# Patient Record
Sex: FEMALE | Race: WHITE | NOT HISPANIC OR LATINO | Employment: FULL TIME | URBAN - METROPOLITAN AREA
[De-identification: names, ages, dates, MRNs, and addresses within clinical notes are randomized per-mention and may not be internally consistent; named-entity substitution may affect disease eponyms.]

---

## 2019-02-15 ENCOUNTER — OFFICE VISIT (OUTPATIENT)
Dept: FAMILY MEDICINE | Facility: CLINIC | Age: 29
End: 2019-02-15
Payer: COMMERCIAL

## 2019-02-15 VITALS
TEMPERATURE: 97.9 F | SYSTOLIC BLOOD PRESSURE: 118 MMHG | DIASTOLIC BLOOD PRESSURE: 72 MMHG | HEIGHT: 66 IN | BODY MASS INDEX: 27.48 KG/M2 | HEART RATE: 64 BPM | WEIGHT: 171 LBS

## 2019-02-15 DIAGNOSIS — Z23 NEED FOR PROPHYLACTIC VACCINATION AND INOCULATION AGAINST INFLUENZA: ICD-10-CM

## 2019-02-15 DIAGNOSIS — Z00.00 ROUTINE HISTORY AND PHYSICAL EXAMINATION OF ADULT: Primary | ICD-10-CM

## 2019-02-15 DIAGNOSIS — Z12.4 SCREENING FOR MALIGNANT NEOPLASM OF CERVIX: ICD-10-CM

## 2019-02-15 PROCEDURE — 90686 IIV4 VACC NO PRSV 0.5 ML IM: CPT | Performed by: FAMILY MEDICINE

## 2019-02-15 PROCEDURE — G0145 SCR C/V CYTO,THINLAYER,RESCR: HCPCS | Performed by: FAMILY MEDICINE

## 2019-02-15 PROCEDURE — 90471 IMMUNIZATION ADMIN: CPT | Performed by: FAMILY MEDICINE

## 2019-02-15 PROCEDURE — 99385 PREV VISIT NEW AGE 18-39: CPT | Mod: 25 | Performed by: FAMILY MEDICINE

## 2019-02-15 ASSESSMENT — ENCOUNTER SYMPTOMS
PARESTHESIAS: 0
PALPITATIONS: 0
NAUSEA: 0
HEADACHES: 0
EYE PAIN: 0
CONSTIPATION: 0
NERVOUS/ANXIOUS: 0
JOINT SWELLING: 0
SHORTNESS OF BREATH: 0
ABDOMINAL PAIN: 0
HEARTBURN: 0
DYSURIA: 0
BREAST MASS: 0
FREQUENCY: 0
ARTHRALGIAS: 0
HEMATURIA: 0
FEVER: 0
MYALGIAS: 0
DIZZINESS: 0
DIARRHEA: 0
COUGH: 0
WEAKNESS: 0
HEMATOCHEZIA: 0
SORE THROAT: 0
CHILLS: 0

## 2019-02-15 ASSESSMENT — MIFFLIN-ST. JEOR: SCORE: 1517.4

## 2019-02-15 NOTE — PROGRESS NOTES
SUBJECTIVE:   CC: Philip Pfeiffer is an 28 year old woman who presents for preventive health visit.     Physical   Annual:     Getting at least 3 servings of Calcium per day:  Yes    Bi-annual eye exam:  NO    Dental care twice a year:  Yes    Sleep apnea or symptoms of sleep apnea:  None    Diet:  Vegetarian/vegan    Frequency of exercise:  2-3 days/week    Duration of exercise:  Less than 15 minutes    Taking medications regularly:  Yes    Medication side effects:  Not applicable    Additional concerns today:  Yes (Establishing care from [facility].  Discuss starting family planning.  Would like PAP and any other updated lab work that is needed.)    PHQ-2 Total Score: 0    Patient is planning to become pregnant later this year.  They are currently using condoms for contraception.  She wants to make sure she is as healthy as possible for the pregnancy.     Today's PHQ-2 Score:   PHQ-2 ( 1999 [employer]) 2/15/2019   Q1: Little interest or pleasure in doing things 0   Q2: Feeling down, depressed or hopeless 0   PHQ-2 Score 0   Q1: Little interest or pleasure in doing things Not at all   Q2: Feeling down, depressed or hopeless Not at all   PHQ-2 Score 0       Abuse: Current or Past(Physical, Sexual or Emotional)- No  Do you feel safe in your environment? Yes    Social History     Tobacco Use     Smoking status: Never Smoker     Smokeless tobacco: Never Used   Substance Use Topics     Alcohol use: Yes     Comment: about 4 glasses wine per week     Alcohol Use 2/15/2019   If you drink alcohol do you typically have greater than 3 drinks per day OR greater than 7 drinks per week? No   No flowsheet data found.    Reviewed orders with patient.  Reviewed health maintenance and updated orders accordingly - Yes  There is no problem list on file for this patient.    Past Surgical History:   Procedure Laterality Date     ORTHOPEDIC SURGERY Right     Right 2nd and 3rd metatarsal ORIF age 14, with subsequent osteomyelitis      "  Social History     Tobacco Use     Smoking status: Never Smoker     Smokeless tobacco: Never Used   Substance Use Topics     Alcohol use: Yes     Comment: about 4 glasses wine per week     History reviewed. No pertinent family history.        Mammogram not appropriate for this patient based on age.    Pertinent mammograms are reviewed under the imaging tab.  History of abnormal Pap smear: NO - age 21-29 PAP every 3 years recommended     Reviewed and updated as needed this visit by clinical staff  Tobacco  Allergies  Meds  Med Hx  Surg Hx  Fam Hx  Soc Hx        Reviewed and updated as needed this visit by Provider            Review of Systems   Constitutional: Negative for chills and fever.   HENT: Negative for congestion, ear pain, hearing loss and sore throat.    Eyes: Negative for pain and visual disturbance.   Respiratory: Negative for cough and shortness of breath.    Cardiovascular: Negative for chest pain, palpitations and peripheral edema.   Gastrointestinal: Negative for abdominal pain, constipation, diarrhea, heartburn, hematochezia and nausea.   Breasts:  Negative for tenderness, breast mass and discharge.   Genitourinary: Negative for dysuria, frequency, genital sores, hematuria, pelvic pain, urgency, vaginal bleeding and vaginal discharge.   Musculoskeletal: Negative for arthralgias, joint swelling and myalgias.   Skin: Negative for rash.   Neurological: Negative for dizziness, weakness, headaches and paresthesias.   Psychiatric/Behavioral: Negative for mood changes. The patient is not nervous/anxious.         OBJECTIVE:   /72 (BP Location: Right arm, Cuff Size: Adult Regular)   Pulse 64   Temp 97.9  F (36.6  C) (Oral)   Ht 1.676 m (5' 6\")   Wt 77.6 kg (171 lb)   LMP 02/01/2019   BMI 27.60 kg/m    Physical Exam  GENERAL: healthy, alert and no distress  EYES: Eyes grossly normal to inspection, PERRL and conjunctivae and sclerae normal  HENT: ear canals and TM's normal, nose and mouth " "without ulcers or lesions  NECK: no adenopathy, no asymmetry, masses, or scars and thyroid normal to palpation  RESP: lungs clear to auscultation - no rales, rhonchi or wheezes  BREAST: normal without masses, tenderness or nipple discharge and no palpable axillary masses or adenopathy  CV: regular rate and rhythm, normal S1 S2, no S3 or S4, no murmur, click or rub, no peripheral edema and peripheral pulses strong  ABDOMEN: soft, nontender, no hepatosplenomegaly, no masses and bowel sounds normal   (female): normal female external genitalia, normal urethral meatus, vaginal mucosa pink, moist, well rugated, and normal cervix/adnexa/uterus without masses or discharge  MS: no gross musculoskeletal defects noted, no edema  SKIN: no suspicious lesions or rashes  NEURO: Normal strength and tone, mentation intact and speech normal  PSYCH: mentation appears normal, affect normal/bright    ASSESSMENT/PLAN:   1. Routine history and physical examination of adult    2. Screening for malignant neoplasm of cervix  - PAP imaged thin layer screen reflex to HPV if ASCUS - recommended age 25 - 29 years    3. Need for prophylactic vaccination and inoculation against influenza  - FLU VACCINE, SPLIT VIRUS, IM (QUADRIVALENT) [70508]- >3 YRS  - Vaccine Administration, Initial [93615]    COUNSELING:  Reviewed preventive health counseling, as reflected in patient instructions    BP Readings from Last 1 Encounters:   02/15/19 118/72     Estimated body mass index is 27.6 kg/m  as calculated from the following:    Height as of this encounter: 1.676 m (5' 6\").    Weight as of this encounter: 77.6 kg (171 lb).      Weight management plan: Discussed healthy diet and exercise guidelines     reports that  has never smoked. she has never used smokeless tobacco.      Counseling Resources:  ATP IV Guidelines  Pooled Cohorts Equation Calculator  Breast Cancer Risk Calculator  FRAX Risk Assessment  ICSI Preventive Guidelines  Dietary Guidelines for " Americans, 2010  USDA's MyPlate  ASA Prophylaxis  Lung CA Screening    Gabriela Avila,   [facility]

## 2019-02-15 NOTE — NURSING NOTE
Prior to injection, verified patient identity using patient's name and date of birth.  Due to injection administration, patient instructed to remain in clinic for 15 minutes  afterwards, and to report any adverse reaction to me immediately.    Marguerite Gomez CMA

## 2019-02-15 NOTE — PROGRESS NOTES

## 2019-02-15 NOTE — PATIENT INSTRUCTIONS
Preventive Health Recommendations  Female Ages 26 - 39  Yearly exam:   See your health care provider every year in order to    Review health changes.     Discuss preventive care.      Review your medicines if you your doctor has prescribed any.    Until age 30: Get a Pap test every three years (more often if you have had an abnormal result).    After age 30: Talk to your doctor about whether you should have a Pap test every 3 years or have a Pap test with HPV screening every 5 years.   You do not need a Pap test if your uterus was removed (hysterectomy) and you have not had cancer.  You should be tested each year for STDs (sexually transmitted diseases), if you're at risk.   Talk to your provider about how often to have your cholesterol checked.  If you are at risk for diabetes, you should have a diabetes test (fasting glucose).  Shots: Get a flu shot each year. Get a tetanus shot every 10 years.   Nutrition:     Eat at least 5 servings of fruits and vegetables each day.    Eat whole-grain bread, whole-wheat pasta and brown rice instead of white grains and rice.    Get adequate Calcium and Vitamin D.     Lifestyle    Exercise at least 150 minutes a week (30 minutes a day, 5 days of the week). This will help you control your weight and prevent disease.    Limit alcohol to one drink per day.    No smoking.     Wear sunscreen to prevent skin cancer.    See your dentist every six months for an exam and cleaning.    Patient Education     Preparing for Pregnancy  Even before you become pregnant, your health matters to your future baby. Adopt good health habits today. And take care of any medical problems you have before becoming pregnant.  Remember: As soon as you know you are pregnant, get regular prenatal care.   Things to consider  Read through the list below. The more items that describe you, the healthier you may be:    I eat a balanced diet.    I keep physically active.    I have my health problems under  control.    My weight is about right.    I don t smoke.    I don t use recreational drugs.    I don t have a drinking problem.  Think about the following:    Who will help you through pregnancy and with childcare?    Do you have health insurance?    Do you have the money needed to cover childcare and other day-to-day child expenses?    Will you be able to take the time you need away from your job for maternity needs and childcare?  Adopt a healthy lifestyle  Each of the following tips can improve your health as you prepare for pregnancy:    Take folic acid 400 to 800 micrograms or a prenatal vitamin daily.     Eat a healthy, well-balanced diet.    Exercise 3 or more times a week and at least 150 minutes weekly.    Get within 15 pounds of your ideal weight.  The first weeks of pregnancy are the most important time in a baby s development. Before you become pregnant:    Don t use recreational drugs.    Don t drink alcohol.    Don t smoke.    Get recommended vaccines.  Working with your healthcare provider  Your healthcare provider can help answer any questions you may have. Do you know when to stop taking birth control pills? Are any over-the-counter medicines safe for pregnant women? You can also ask about special care you may need if you have any of the following:    Sexually transmitted diseases (STDs), like herpes or chlamydia    Diabetes    High blood pressure    Other chronic health problems   Date Last Reviewed: 10/1/2017    [id number] [employer]. [address] [place] [address], [place] [address]. All rights reserved. This information is not intended as a substitute for professional medical care. Always follow your healthcare professional's instructions.

## 2019-02-19 LAB
COPATH REPORT: NORMAL
PAP: NORMAL

## 2019-05-21 ENCOUNTER — APPOINTMENT (OUTPATIENT)
Age: 29
Setting detail: DERMATOLOGY
End: 2019-05-22

## 2019-05-21 VITALS — WEIGHT: 170 LBS | HEIGHT: 65 IN

## 2019-05-21 DIAGNOSIS — B07.8 OTHER VIRAL WARTS: ICD-10-CM

## 2019-05-21 PROCEDURE — 17110 DESTRUCT B9 LESION 1-14: CPT

## 2019-05-21 PROCEDURE — OTHER BENIGN DESTRUCTION: OTHER

## 2019-05-21 PROCEDURE — OTHER COUNSELING: OTHER

## 2019-05-21 ASSESSMENT — LOCATION SIMPLE DESCRIPTION DERM
LOCATION SIMPLE: RIGHT INDEX FINGER
LOCATION SIMPLE: RIGHT 3ND FINGERNAIL
LOCATION SIMPLE: RIGHT INDEX FINGER

## 2019-05-21 ASSESSMENT — LOCATION ZONE DERM
LOCATION ZONE: FINGER
LOCATION ZONE: FINGER

## 2019-05-21 ASSESSMENT — LOCATION DETAILED DESCRIPTION DERM
LOCATION DETAILED: RIGHT INDEX FINGER LUNULA
LOCATION DETAILED: PERIUNGUAL SKIN RIGHT INDEX FINGER
LOCATION DETAILED: PERIUNGUAL SKIN RIGHT INDEX FINGER

## 2019-05-21 NOTE — PROCEDURE: BENIGN DESTRUCTION
Post-Care Instructions: I reviewed with the patient in detail post-care instructions. Patient is to wear sunprotection, and avoid picking at any of the treated lesions. Pt may apply Vaseline to crusted or scabbing areas.
Medical Necessity Information: It is in your best interest to select a reason for this procedure from the list below. All of these items fulfill various CMS LCD requirements except the new and changing color options.
Render Post-Care Instructions In Note?: no
Detail Level: Detailed
Anesthesia Volume In Cc: 0.5
Treatment Number (Will Not Render If 0): 0
Consent: The patient's consent was obtained including but not limited to risks of crusting, scabbing, blistering, scarring, darker or lighter pigmentary change, recurrence, incomplete removal and infection.
Medical Necessity Clause: This procedure was medically necessary because the lesions that were treated were: spreading

## 2019-05-21 NOTE — HPI: WARTS (VERRUCA)
Is This A New Presentation, Or A Follow-Up?: Follow Up Lior
Additional History: The wart is getting smaller

## 2019-12-18 ENCOUNTER — APPOINTMENT (OUTPATIENT)
Age: 29
Setting detail: DERMATOLOGY
End: 2019-12-19

## 2019-12-18 DIAGNOSIS — L91.8 OTHER HYPERTROPHIC DISORDERS OF THE SKIN: ICD-10-CM

## 2019-12-18 PROCEDURE — 11300 SHAVE SKIN LESION 0.5 CM/<: CPT

## 2019-12-18 PROCEDURE — OTHER COUNSELING: OTHER

## 2019-12-18 PROCEDURE — OTHER SHAVE REMOVAL (NO PATHOLOGY): OTHER

## 2019-12-18 PROCEDURE — 99213 OFFICE O/P EST LOW 20 MIN: CPT | Mod: 25

## 2019-12-18 ASSESSMENT — LOCATION ZONE DERM
LOCATION ZONE: ARM
LOCATION ZONE: AXILLAE

## 2019-12-18 ASSESSMENT — LOCATION DETAILED DESCRIPTION DERM
LOCATION DETAILED: RIGHT AXILLARY VAULT
LOCATION DETAILED: RIGHT ANTERIOR SHOULDER

## 2019-12-18 ASSESSMENT — LOCATION SIMPLE DESCRIPTION DERM
LOCATION SIMPLE: RIGHT AXILLARY VAULT
LOCATION SIMPLE: RIGHT SHOULDER

## 2019-12-18 NOTE — PROCEDURE: SHAVE REMOVAL (NO PATHOLOGY)
Wound Care: Petrolatum
Medical Necessity Clause: This procedure was medically necessary because the lesion that was treated was:
Path Notes (To The Dermatopathologist): Please check margins.
Anesthesia Type: 1% lidocaine with epinephrine
Bill For Surgical Tray: no
Consent was obtained from the patient. The risks and benefits to therapy were discussed in detail. Specifically, the risks of infection, scarring, bleeding, prolonged wound healing, incomplete removal, allergy to anesthesia, nerve injury and recurrence were addressed. Prior to the procedure, the treatment site was clearly identified and confirmed by the patient. All components of Universal Protocol/PAUSE Rule completed.
Hemostasis: Drysol
X Size Of Lesion In Cm (Optional): 0
Post-Care Instructions: I reviewed with the patient in detail post-care instructions. Patient is to keep the biopsy site dry overnight, and then apply bacitracin twice daily until healed. Patient may apply hydrogen peroxide soaks to remove any crusting.
Detail Level: Detailed
Medical Necessity Information: It is in your best interest to select a reason for this procedure from the list below. All of these items fulfill various CMS LCD requirements except the new and changing color options.

## 2019-12-20 ENCOUNTER — APPOINTMENT (OUTPATIENT)
Age: 29
Setting detail: DERMATOLOGY
End: 2019-12-20

## 2020-01-14 ENCOUNTER — RX ONLY (RX ONLY)
Age: 30
End: 2020-01-14

## 2020-02-19 ENCOUNTER — APPOINTMENT (OUTPATIENT)
Age: 30
Setting detail: DERMATOLOGY
End: 2020-02-19

## 2020-02-19 VITALS — HEIGHT: 65 IN | WEIGHT: 170 LBS | RESPIRATION RATE: 16 BRPM

## 2020-02-19 DIAGNOSIS — D22 MELANOCYTIC NEVI: ICD-10-CM

## 2020-02-19 PROBLEM — D22.5 MELANOCYTIC NEVI OF TRUNK: Status: ACTIVE | Noted: 2020-02-19

## 2020-02-19 PROCEDURE — OTHER COUNSELING: OTHER

## 2020-02-19 PROCEDURE — 99201: CPT

## 2020-02-19 PROCEDURE — OTHER ADDITIONAL NOTES: OTHER

## 2020-02-19 ASSESSMENT — LOCATION SIMPLE DESCRIPTION DERM
LOCATION SIMPLE: UPPER BACK
LOCATION SIMPLE: CHEST
LOCATION SIMPLE: LEFT LOWER BACK

## 2020-02-19 ASSESSMENT — LOCATION ZONE DERM: LOCATION ZONE: TRUNK

## 2020-02-19 ASSESSMENT — LOCATION DETAILED DESCRIPTION DERM
LOCATION DETAILED: SUPERIOR THORACIC SPINE
LOCATION DETAILED: UPPER STERNUM
LOCATION DETAILED: LEFT SUPERIOR LATERAL LOWER BACK

## 2020-02-19 NOTE — HPI: EVALUATION OF SKIN LESION(S)
How Severe Are Your Spot(S)?: mild
Have Your Spot(S) Been Treated In The Past?: has not been treated
Hpi Title: Evaluation of Skin Lesions
Additional History: Fbe

## 2020-03-10 ENCOUNTER — HEALTH MAINTENANCE LETTER (OUTPATIENT)
Age: 30
End: 2020-03-10

## 2020-11-06 ENCOUNTER — TRANSFERRED RECORDS (OUTPATIENT)
Dept: HEALTH INFORMATION MANAGEMENT | Facility: CLINIC | Age: 30
End: 2020-11-06

## 2020-11-06 LAB
HBA1C MFR BLD: 5.8 % (ref 0–5.7)
HPV ABSTRACT: NORMAL
PAP-ABSTRACT: NORMAL
PAP: NORMAL

## 2020-12-27 ENCOUNTER — HEALTH MAINTENANCE LETTER (OUTPATIENT)
Age: 30
End: 2020-12-27

## 2021-01-20 LAB
ABO + RH BLD: NORMAL
ABO + RH BLD: NORMAL
BLD GP AB SCN SERPL QL: NEGATIVE
CULTURE MICRO: NORMAL
HBV SURFACE AG SERPL QL IA: NORMAL
HEMOGLOBIN: 13 G/DL (ref 11.7–15.7)
HIV 1+2 AB+HIV1 P24 AG SERPL QL IA: NORMAL
PLATELET # BLD AUTO: 260 10^9/L
RUBELLA ABY IGG: NORMAL
RUBELLA ANTIBODY IGG QUANTITATIVE: 4.27 IU/ML
TREPONEMA ANTIBODIES: 0.07

## 2021-02-23 LAB — GLU GEST SCREEN 1HR 50G: 150

## 2021-02-26 LAB
GLU, 1 HOUR, 100 G: 153
GLU, 2 HOUR, 100 G: 152
GLU, 2 HOUR, 100 G: 152

## 2021-03-17 ENCOUNTER — MYC MEDICAL ADVICE (OUTPATIENT)
Dept: MIDWIFE SERVICES | Facility: CLINIC | Age: 31
End: 2021-03-17

## 2021-03-17 NOTE — TELEPHONE ENCOUNTER
: Pt scheduled a nurse intake appt on 3/23 and new prenatal on 3/24. When she called to schedule, she informed reception that she hadn't had any care anywhere. Conflicting information as pt stated in MyChart message she was seen at [facility]. Advised that she have her records faxed to us. If it's only labs, reasonable to keep the two appointments scheduled. If prenatal care records, should be reviewed by midwife first.   Niki Fish, RN-BSN

## 2021-03-18 NOTE — TELEPHONE ENCOUNTER
"[email or web address] for both the \"release from\" and \"release to\" clinic... faxed back to number we received fax from and indicated error.     Asked for patient's records to be released to us.       Jolie      "

## 2021-03-23 ENCOUNTER — PRENATAL OFFICE VISIT (OUTPATIENT)
Dept: NURSING | Facility: CLINIC | Age: 31
End: 2021-03-23
Payer: COMMERCIAL

## 2021-03-23 VITALS — BODY MASS INDEX: 27.6 KG/M2 | HEIGHT: 66 IN

## 2021-03-23 DIAGNOSIS — Z34.00 ENCOUNTER FOR SUPERVISION OF NORMAL FIRST PREGNANCY: Primary | ICD-10-CM

## 2021-03-23 DIAGNOSIS — Z23 NEED FOR TDAP VACCINATION: ICD-10-CM

## 2021-03-23 PROBLEM — Z34.01 ENCOUNTER FOR SUPERVISION OF NORMAL FIRST PREGNANCY IN FIRST TRIMESTER: Status: ACTIVE | Noted: 2021-01-18

## 2021-03-23 PROCEDURE — 99207 PR NO CHARGE NURSE ONLY: CPT

## 2021-03-23 NOTE — PROGRESS NOTES
Important Information for Provider:     New ob transfer form [facility], [id number] 8/02/2021. NOB nurse intake/ NOB labs completed and up to date. Abnormal 1 hour GCT, passed 3 hour GTT ( [facility]) . Genetic testing 1/20/21, having a girl, normal screening   Fetal survey scheduled for 3/31/2021, patient has NOB appointment with CNM at [place] 3/24/2021, patient does not want appointment after fetal survey due to her work       Prenatal OB Questionnaire  Patient supplied answers from flow sheet for:  Prenatal OB Questionnaire.  Past Medical History  Have you ever recieved care for your mental health? : No  Have you ever been in a major accident or suffered serious trauma?: No  Within the last year, has anyone hit, slapped, kicked or otherwise hurt you?: No  In the last year, has anyone forced you to have sex when you didn't want to?: No    Past Medical History 2   Have you ever received a blood transfusion?: No  Would you accept a blood transfusion if was medically recommended?: Yes  Does anyone in your home smoke?: No   Is your blood type Rh negative?: No  Have you ever ?: No  Have you been hospitalized for a nonsurgical reason excluding normal delivery?: No  Have you ever had an abnormal pap smear?: No    Past Medical History (Continued)  Do you have a history of abnormalities of the uterus?: No  Did your mother take DARSHANA or any other hormones when she was pregnant with you?: No  Do you have any other problems we have not asked about which you feel may be important to this pregnancy?: No            Allergies as of 3/23/2021:    Allergies as of 03/23/2021 - Reviewed 03/23/2021   Allergen Reaction Noted     Vancomycin Hives and Itching 04/24/2006       Current medications are:  Current Outpatient Medications   Medication Sig Dispense Refill     Docosahexaenoic Acid (DHA) 200 MG capsule            Early ultrasound screening tool:    Does patient have irregular periods?  No  Did patient use hormonal  birth control in the three months prior to positive urine pregnancy test? No  Is the patient breastfeeding?  No  Is the patient 10 weeks or greater at time of education visit?  Yes

## 2021-03-24 ENCOUNTER — PRENATAL OFFICE VISIT (OUTPATIENT)
Dept: MIDWIFE SERVICES | Facility: CLINIC | Age: 31
End: 2021-03-24
Payer: COMMERCIAL

## 2021-03-24 ENCOUNTER — TRANSFERRED RECORDS (OUTPATIENT)
Dept: HEALTH INFORMATION MANAGEMENT | Facility: CLINIC | Age: 31
End: 2021-03-24

## 2021-03-24 VITALS
TEMPERATURE: 98 F | BODY MASS INDEX: 27.55 KG/M2 | DIASTOLIC BLOOD PRESSURE: 75 MMHG | WEIGHT: 171.4 LBS | SYSTOLIC BLOOD PRESSURE: 121 MMHG | HEIGHT: 66 IN | HEART RATE: 112 BPM

## 2021-03-24 DIAGNOSIS — Z34.01 ENCOUNTER FOR SUPERVISION OF NORMAL FIRST PREGNANCY IN FIRST TRIMESTER: ICD-10-CM

## 2021-03-24 DIAGNOSIS — Z34.02 SUPERVISION OF NORMAL FIRST PREGNANCY IN SECOND TRIMESTER: Primary | ICD-10-CM

## 2021-03-24 PROCEDURE — 99207 PR FIRST OB VISIT: CPT | Performed by: ADVANCED PRACTICE MIDWIFE

## 2021-03-24 ASSESSMENT — MIFFLIN-ST. JEOR: SCORE: 1509.22

## 2021-03-24 NOTE — PROGRESS NOTES
21w2d   Philip Pfeiffer is a 30 year old who presents to the clinic for an new ob visit. She is not a previous CNM patient. She transferred care from [facility]. She did an early GCT that she failed but passed the 2 hr GTT. She completed the early sugar testing because her A1C was borderline at 5.8. She did genetic screening that was normal and found out they are having a girl. The rest of her labs were all WNL. She is set up for an anatomy scan next week. She plans on taking birth classes, discussed FADIA, Childbirth Collected, Everyday Miracles, and Blooma. Discussed lactation support we have, nervous about breastfeeding, she has really sensitive nipples. Discussed nipple shields if needed at first. No other questions or concerns today.     Estimated Date of Delivery: Aug 2, 2021 is calculated from Patient's last menstrual period was 10/26/2020.     She has not had bleeding since her LMP.   She has had mild nausea. Weigh loss has not occurred.   This was a planned pregnancy.   FOB is involved, Luke Pfeiffer   OTHER CONCERNS: no concerns     INFECTION HISTORY  HIV: no  Hepatitis B: no  Hepatitis C: no  Syphilis:  no  Tuberculosis: no   PPD- no  Herpes self: no  Herpes partner:  no  Chlamydia:  no  Gonorrhea:  no  HPV: no  BV:  no  Trichomonis:  no  Chicken Pox:  YES  ====================================================  GENETIC SCREENING  Genetic screening reviewed. High Risk? Genetic testing done with HP, all WNL, having a girl   ====================================================  PERSONAL/SOCIAL HISTORY  Lives lives with their spouse.  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  HX OF ABUSE: no  =====================================================   REVIEW OF SYSTEMS  CONSTITUTIONAL: NEGATIVE for fever, chills  EYES: NEGATIVE for vision changes   RESP: NEGATIVE for significant cough or SOB  CV: NEGATIVE for chest pain, palpitations   GI: NEGATIVE for nausea, abdominal pain,  "heartburn, or change in bowel habits  : NEGATIVE for frequency, dysuria, or hematuria  MUSCULOSKELETAL: NEGATIVE for significant arthralgias or myalgia  NEURO: NEGATIVE for weakness, dizziness or paresthesias or headache  ====================================================    PHYSICAL EXAM:  Ht 1.676 m (5' 6\")   LMP 10/26/2020   BMI 27.60 kg/m    BMI- Body mass index is 27.6 kg/m .,     RECOMMENDED WEIGHT GAIN: 15-25 lbs.  PHQ9- Today's Depression Rating was No Value exists for the : HP#PHQ9  GENERAL:  Pleasant pregnant female, alert, well groomed.   SKIN:  Warm and dry, without lesions or rashes  HEAD: Symmetrical features.  NECK:  Thyroid without enlargement and nodules.  Lymph nodes not palpable.   LUNGS:  Clear to auscultation.  HEART:  RRR without murmur.  ABDOMEN: Soft without masses , tenderness or organomegaly.  No CVA tenderness. No scars noted.     MUSCULOSKELETAL:  Full range of motion  EXTREMITIES:  No edema. No significant varicosities.   GENITALIA: deferred.    =========================================  ASSESSMENT:  21w2d  (Z34.02) Supervision of normal first pregnancy in second trimester  (primary encounter diagnosis)    PREGNANCY AT RISK? no  ==========================================  PLAN:  Instructed on use of triage nurse line and contacting the on call CNM after hours for an urgent need such as fever, vagina bleeding, bladder or vaginal infection, rupture of membranes,  or term labor.    Instructed on best evidence for: weight gain for her BMI for pregnancy; healthy diet and foods to avoid; exercise and activity during pregnancy;avoiding exposure to toxoplasmosis; and maintenance of a generally healthy lifestyle.   Discussed the harms, benefits, side effects and alternative therapies for current prescribed and OTC medications.  Follow up next week as scheduled for a fetal survey.   Follow up in 4 weeks for 3 hr GTT and follow up clinic appointment.     Nasima Harris, " APRN CNM

## 2021-04-02 ENCOUNTER — TRANSCRIBE ORDERS (OUTPATIENT)
Dept: MATERNAL FETAL MEDICINE | Facility: CLINIC | Age: 31
End: 2021-04-02

## 2021-04-02 ENCOUNTER — ANCILLARY PROCEDURE (OUTPATIENT)
Dept: ULTRASOUND IMAGING | Facility: CLINIC | Age: 31
End: 2021-04-02
Attending: OBSTETRICS & GYNECOLOGY
Payer: COMMERCIAL

## 2021-04-02 DIAGNOSIS — O26.90 PREGNANCY RELATED CONDITION, ANTEPARTUM: Primary | ICD-10-CM

## 2021-04-02 DIAGNOSIS — Z34.00 ENCOUNTER FOR SUPERVISION OF NORMAL FIRST PREGNANCY: ICD-10-CM

## 2021-04-02 DIAGNOSIS — O36.8390 FETAL ARRHYTHMIA AFFECTING PREGNANCY, ANTEPARTUM: Primary | ICD-10-CM

## 2021-04-02 PROCEDURE — 76805 OB US >/= 14 WKS SNGL FETUS: CPT | Performed by: OBSTETRICS & GYNECOLOGY

## 2021-04-05 ENCOUNTER — PRE VISIT (OUTPATIENT)
Dept: MATERNAL FETAL MEDICINE | Facility: CLINIC | Age: 31
End: 2021-04-05

## 2021-04-06 ENCOUNTER — OFFICE VISIT (OUTPATIENT)
Dept: MATERNAL FETAL MEDICINE | Facility: CLINIC | Age: 31
End: 2021-04-06
Attending: OBSTETRICS & GYNECOLOGY
Payer: COMMERCIAL

## 2021-04-06 ENCOUNTER — HOSPITAL ENCOUNTER (OUTPATIENT)
Dept: ULTRASOUND IMAGING | Facility: CLINIC | Age: 31
End: 2021-04-06
Attending: OBSTETRICS & GYNECOLOGY
Payer: COMMERCIAL

## 2021-04-06 DIAGNOSIS — O35.9XX0 SUSPECTED FETAL ANOMALY, ANTEPARTUM, SINGLE OR UNSPECIFIED FETUS: Primary | ICD-10-CM

## 2021-04-06 DIAGNOSIS — O26.90 PREGNANCY RELATED CONDITION, ANTEPARTUM: ICD-10-CM

## 2021-04-06 PROCEDURE — 76811 OB US DETAILED SNGL FETUS: CPT | Mod: 26 | Performed by: OBSTETRICS & GYNECOLOGY

## 2021-04-06 PROCEDURE — 76811 OB US DETAILED SNGL FETUS: CPT

## 2021-04-06 NOTE — PROGRESS NOTES
The patient was seen for an ultrasound in the [facility] at the [facility] today.  For a detailed report of the ultrasound examination, please see the ultrasound report which can be found under the imaging tab.    This patient's plan of care and the images were reviewed with Dr.Contag Sonu Palomo MD    4/6/2021

## 2021-04-23 ENCOUNTER — PRENATAL OFFICE VISIT (OUTPATIENT)
Dept: MIDWIFE SERVICES | Facility: CLINIC | Age: 31
End: 2021-04-23
Payer: COMMERCIAL

## 2021-04-23 VITALS
HEIGHT: 66 IN | HEART RATE: 89 BPM | SYSTOLIC BLOOD PRESSURE: 121 MMHG | DIASTOLIC BLOOD PRESSURE: 71 MMHG | WEIGHT: 178.9 LBS | TEMPERATURE: 97.4 F | BODY MASS INDEX: 28.75 KG/M2

## 2021-04-23 DIAGNOSIS — Z34.02 ENCOUNTER FOR SUPERVISION OF NORMAL FIRST PREGNANCY IN SECOND TRIMESTER: Primary | ICD-10-CM

## 2021-04-23 LAB
GLUCOSE 1H P 100 G GLC PO SERPL-MCNC: 175 MG/DL (ref 60–179)
GLUCOSE 2H P 100 G GLC PO SERPL-MCNC: 154 MG/DL (ref 60–154)
GLUCOSE 3H P 100 G GLC PO SERPL-MCNC: 118 MG/DL (ref 60–139)
GLUCOSE P FAST SERPL-MCNC: 78 MG/DL (ref 60–94)

## 2021-04-23 PROCEDURE — 99207 PR PRENATAL VISIT: CPT | Performed by: ADVANCED PRACTICE MIDWIFE

## 2021-04-23 PROCEDURE — 36415 COLL VENOUS BLD VENIPUNCTURE: CPT | Performed by: ADVANCED PRACTICE MIDWIFE

## 2021-04-23 PROCEDURE — 82951 GLUCOSE TOLERANCE TEST (GTT): CPT | Performed by: ADVANCED PRACTICE MIDWIFE

## 2021-04-23 PROCEDURE — 82952 GTT-ADDED SAMPLES: CPT | Performed by: ADVANCED PRACTICE MIDWIFE

## 2021-04-23 ASSESSMENT — MIFFLIN-ST. JEOR: SCORE: 1543.24

## 2021-04-23 ASSESSMENT — PATIENT HEALTH QUESTIONNAIRE - PHQ9: SUM OF ALL RESPONSES TO PHQ QUESTIONS 1-9: 1

## 2021-04-23 NOTE — PATIENT INSTRUCTIONS
End of Pregnancy Information    We are so pleased you have chosen us to help you with your birth.  Our goal is to help support a safe, meaningful and rewarding birth experience for you and your family.  The following information may be helpful as you near the end of your pregnancy.  If [facility] would [facility] please call [phone].    LABOR INSTRUCTIONS  If you would like to try and avoid the use of pain medications for your labor, one of the best things you can do is to stay home as long as your and your baby s condition allow it, especially during the early part of your labor.  The Midwife on call can help guide you through this part if you are unsure, call us at [phone] and press option 1 any time, day or night.  Part of our assessment will be in speaking with you directly on the phone.  How do I know if I am in active labor?   Active labor will be regular, painful contractions that are lasting a minute or more and coming every 5 minutes for more than an hour in a row.  The intensity, or how much they hurt is more important that how frequently or often the contractions are coming.  You could try and take a hot shower or tub bath, if the contractions continue to increase in intensity or frequency, it can be a sign of active labor.  If you feel like you could lay down and doze between contractions, then do that, doze, let your contraction come and know that your body is working towards having your baby and then relax and rest again.  You could try having a light snack, like fruit, cereal or toast and continue to drink fluids.    Reasons to call the On Call Midwife @ [phone]  o If you have vaginal bleeding like a heavy period  o If you think your water broke  o If you think your baby has not been moving enough  o If you think you are in labor  o If you are worried or concerned about yourself or your baby      PAIN MEDICATIONS IN LABOR  Should I use pain  medications in labor?  This decision to use pain medication or not during labor is for each woman to decide.  Some women have very strong feelings one way or the other, others would like to wait and see how it goes.  My personal recommendation would be to be flexible.  If someone is laboring and coping well with the process, they are making progress and do not want pain medication then they don t need it.  We have bathtubs, birthing balls, labor slings, rocking chairs and a labor position menu to support laboring without intervention and/or pain medication.  However, if someone has been awake with early labor for several nights, is having a hard time coping with the process, or wants pain medication, then pain medication can be a helpful tool to have.  What types of pain medications are available to me during my labor?    Nitrous Oxide  Nitrous Oxide is a quick acting gas that you breathe in through a mask.  It is used all over the world for labor pain relief.  Many dentists use it too.  We call it  nitrous  for short, other people may call it laughing gas.  We have a handout with more details about using Nitrous Oxide during labor.    IV narcotic, Fentanyl   Narcotic pain medications are medicines that go into your IV or as a shot into your muscle.  They help to  take the edge off the pain .  Thy do not, in general remove the pain.  They can make you feel kind of sleepy and help you relax better, especially in the earlier parts of labor.  Most of the time they last a short time (1-4 hours).  These medicines do  get to  your baby.  So your baby may get sleepy too.  This does not hurt your baby.  We do not give these medications if we think you will give birth to your baby soon.  This way your body will get rid of the medicine before your baby is born.    Epidural  Epidural is the most common pain medicine used by women in labor across the [place].   An epidural is a tiny, soft tube that is placed into your  lower back that can give you pain medicine until after your baby is born.  Most often, a labor epidural provides the most complete labor pain relief that we have available.  A labor epidural is placed by a special doctor call an Anesthesiologist.  In order to have one placed you need to have an IV, and then sit on the side of the bed in kind of a slouched position curling around your baby.  After an epidural is placed you will need to stay in bed, we will help you change positions from side to side and sitting up to help your labor continue to progress.  We will also empty your bladder with a catheter about every three hours.    What Should I Bring to the Hospital?    A picture ID    Your insurance card    Your birth plan if you have one    Eyeglasses and/or contacts    Toiletries: Toothbrush and toothpaste, lip balm (it can be very dry at the hospital) lotion, deodorant, a brush or comb, hairband or barrettes    A bathrobe if you like to wear one    Warm sock or slippers    A camera and     Cell phone and cell phone     A comfy nursing bra    A soft blanket for baby    An infant car seat    A going home outfit for you, soft and comfortable, something you may have worn when you were about 5 months pregnant    A going home outfit and hat for your baby    Snacks for your support people    Some optional treats for you and your partner:  o A pillow from home  o Flameless candles  o A music player  o Treats to eat in postpartum like granola bars, cookies and fruit  o A breastfeeding pillow - especially if you intend to use one when you go home    * * * *   We are looking forward to working with you during this special time in your life.  If you have questions or have something we can do to help you, please don't hesitate to ask at one of your appointments, on Actiwavet or calling [phone] and press option 1.    AGUSTIN Ronquillo VASQUEZ         Birth Planning Worksheet Information    The following is a  worksheet to help you consider your preferences for your labor, birth and hospital stay.  Please discuss your preferences at your prenatal visits and share a copy of this form with your nurses and provider when you arrive at the Birthplace.    If you would like to have waterbirth as an option, please talk to your Midwife at a prenatal appointment so we can review and have you sign a consent and draw a Hepatitis C  blood test.    During uncomplicated pregnancies and labors:    We encourage support person/s to be actively involved in the birth process.  This may include providing comfort measures during labor, cutting the umbilical cord, being present at  birth and helping with infant cares.    The external fetal monitor is connected when you arrive at the hospital until a reassuring pattern is obtained.  This takes a minimum of twenty minutes.  If all is well, we then listen to the baby s heartbeat periodically.    We encouraged walking, changing positions, using the tub, birthing ball and labor slings during labor.  We will help you give birth in the position that is most effective and comfortable for you.    The decision for pain medications (including epidural) is left up to you    At delivery the baby will be placed skin to skin on your abdomen to allow for delayed cord clamping and usually until after the first breastfeeding.    Administration of Vitamin K and antibiotic eye ointment are recommended for your baby within the first two hours of life.    During uncomplicated pregnancies or labors, WE DO NOT routinely:  o Start IVs or use internal fetal monitors  o Use Pitocin or other medications during labor  o Do episiotomies, give enemas or shave pubic hair  o Use forceps, vacuum extraction or encouraged  birth    After your baby is born, for healthy moms and babies:    Your baby will room in with you and your support person is encouraged to stay    We will give you individualized help with  breastfeeding or formula feeding and caring for your baby    No pacifiers, bottles or formula will be given to  babies    All routine  procedures and exams will be done in your room    Circumcisions are not performed at the Birthplace.  Please discuss your circumcision options with your baby s health care provider.          Please feel free to answer the questions with as much or as little detail as you would like.  It is okay if you prefer not to fill out a birth plan or if you have another format.  It is also okay if you only want to answer a few questions but not all of them. This is for you if you find it helpful.    If you do chose to fill it out please bring it to your next appointment so you can go through it with your provider and scan it into your chart.    Your Birth Preferences Worksheet      Your doctor or midwife is _________________________  Expectant mother s name _________________________  Partner s name __________________________________  Other support people attending your birth_____________  _______________________________________________        Is there anything that you would like your healthcare team to know about you?          Do you have any fears or concerns about the birth of your baby?          How have you prepared for the experience of birth?           Your preferences for your labor and birth?           Your thoughts about  pain relief?           Your preferences for postpartum and for your ?           Your plans for feeding your baby?           Other ways we can help you?     Pregnancy Resources    Childbirth and Parenting Education:     [facility]: [email or web address] Walnut Creek Medical CenterWAPA.[employer]/   [phone]-[phone]    [place]: (education, yoga & wellness) www.[employer]    Enlightened Mama: www.enlightenedPillGuard.[employer]     Childbirth collective: (Parent topic nights)  www.childbirthcollective.org/    Hypnobabies:  www.hypnobabiestwincities.com/    Hypnobirthing:   Http://hypnobirthing.com/    Information about doulas:  Childbirth collective: http://www.childbirthcollective.org/  [employer]oul[employer] of [place] (GALO):  www.galo.org  [facility]  project: http://twincitiesdoulaproject.com/       Book Recommendations:      Tami Sarahi's Birthing From Within--first few chapters include a new-age tone, you may prefer to skip it and keep going, because there is good stuff later.  This book recommendation covers emotional preparation, but does cover coping with pain, and use of both pharmacological and nonpharmacological methods.     Dr. Bull' The Pregnancy Book and The Birth Book--the pregnancy book goes month-by month     Womanly Art of Breastfeeding by La Leche League International   Bestfeeding by Jojo Boone--great pictures     Mothering Your Nursing Toddler, by Cele [place].   Addresses dealing with so many of the challenging behaviors of a nursing toddler.     How Weaning Happens, by La Leche League.  Discusses weaning at all ages, from medically necessary weaning of an infant, all the way up to age 5 (or older), with why/why not, and strategies.  Very empowering book both for deciding to wean and deciding not to.    Internet Resources:     American College of Nurse-Midwives (ACNM) http://www.midwife.org/; look at the informational handouts at http://www.midwife.org/Share-With-Women     www.mymidwife.org     Mother to Baby (Medication and Herbal guidance in pregnancy): http://www.mothertobaby.org  Toll-Free Hotline: 878.547.2114     LactMed (Medication use while breastfeeding): http://toxnet.nlm.nih.gov/newtoxnet/lactmed.htm     Women's Health.gov:  http://www.womenshealth.gov/a-z-topics/index.html     American pregnancy association - http://americanpregnancy.org      Early Childhood and Family Education (ECFE):  ECFE offers parents hands-on learning experiences that will nourish a lifetime of teachable moments.  http://ecfe.info/ecfe-home/     March of Dimes  "www.[employer]     FDA - Nutrition  www.mypyramid.gov  Under \"For Consumers,\" click on \"pregnant and breastfeeding women.\"      Centers for Disease Control and Prevention (CDC) - Vaccines : http://www.cdc.gov/vaccines/        When researching information on the web, question the validity of websites.  The Tynt .gov, .edu and.org tend to be more reliable information.  If there are a lot of advertisements, be cautious of the information provided. Blogs and chat rooms can often have incorrect information.        "

## 2021-04-28 ENCOUNTER — HOSPITAL ENCOUNTER (OUTPATIENT)
Facility: CLINIC | Age: 31
End: 2021-04-28
Payer: COMMERCIAL

## 2021-05-05 ENCOUNTER — OFFICE VISIT (OUTPATIENT)
Dept: MATERNAL FETAL MEDICINE | Facility: CLINIC | Age: 31
End: 2021-05-05
Attending: OBSTETRICS & GYNECOLOGY
Payer: COMMERCIAL

## 2021-05-05 ENCOUNTER — HOSPITAL ENCOUNTER (OUTPATIENT)
Dept: ULTRASOUND IMAGING | Facility: CLINIC | Age: 31
End: 2021-05-05
Attending: OBSTETRICS & GYNECOLOGY
Payer: COMMERCIAL

## 2021-05-05 DIAGNOSIS — O35.9XX0 SUSPECTED FETAL ANOMALY, ANTEPARTUM, SINGLE OR UNSPECIFIED FETUS: Primary | ICD-10-CM

## 2021-05-05 DIAGNOSIS — O35.9XX0 SUSPECTED FETAL ANOMALY, ANTEPARTUM, SINGLE OR UNSPECIFIED FETUS: ICD-10-CM

## 2021-05-05 PROCEDURE — 76816 OB US FOLLOW-UP PER FETUS: CPT

## 2021-05-05 PROCEDURE — 76816 OB US FOLLOW-UP PER FETUS: CPT | Mod: 26 | Performed by: OBSTETRICS & GYNECOLOGY

## 2021-05-05 NOTE — PROGRESS NOTES
Philip Pfeiffer was seen for an ultrasound today at the Maternal-Fetal Medicine center.      For the details of the ultrasound please see the report which can be found under the imaging tab.      Leticia Eric MD  , OB/GYN  Maternal-Fetal Medicine  [email or web address]  [phone] (Main MFM Office)  [phone] or [phone] (for 24 hour MFM questions)  [phone] (Pager)

## 2021-05-26 ENCOUNTER — PRENATAL OFFICE VISIT (OUTPATIENT)
Dept: MIDWIFE SERVICES | Facility: CLINIC | Age: 31
End: 2021-05-26
Payer: COMMERCIAL

## 2021-05-26 VITALS
WEIGHT: 181.5 LBS | DIASTOLIC BLOOD PRESSURE: 72 MMHG | OXYGEN SATURATION: 97 % | SYSTOLIC BLOOD PRESSURE: 111 MMHG | BODY MASS INDEX: 29.29 KG/M2 | HEART RATE: 101 BPM

## 2021-05-26 DIAGNOSIS — Z34.03 ENCOUNTER FOR SUPERVISION OF NORMAL FIRST PREGNANCY IN THIRD TRIMESTER: Primary | ICD-10-CM

## 2021-05-26 DIAGNOSIS — Z23 NEED FOR TDAP VACCINATION: ICD-10-CM

## 2021-05-26 PROCEDURE — 90715 TDAP VACCINE 7 YRS/> IM: CPT | Performed by: ADVANCED PRACTICE MIDWIFE

## 2021-05-26 PROCEDURE — 99207 PR PRENATAL VISIT: CPT | Performed by: ADVANCED PRACTICE MIDWIFE

## 2021-05-26 PROCEDURE — 90471 IMMUNIZATION ADMIN: CPT | Performed by: ADVANCED PRACTICE MIDWIFE

## 2021-05-26 NOTE — PROGRESS NOTES
30w2d  Philip is feeling well today. Baby is moving a lot and keeps her up at night sometimes. Denies leaking of fluid, vaginal bleeding, regular uterine contractions, headache, visual changes, or other concerns. She is having some round ligament pain on the R side with long walks. Seeing her chiropractor, which is helpful. TDAP today. No questions or concerns. RTC in 2 weeks.    AGUSTIN Duffy CNM

## 2021-06-09 ENCOUNTER — PRENATAL OFFICE VISIT (OUTPATIENT)
Dept: MIDWIFE SERVICES | Facility: CLINIC | Age: 31
End: 2021-06-09
Payer: COMMERCIAL

## 2021-06-09 VITALS
DIASTOLIC BLOOD PRESSURE: 82 MMHG | HEART RATE: 100 BPM | WEIGHT: 185.6 LBS | SYSTOLIC BLOOD PRESSURE: 122 MMHG | BODY MASS INDEX: 29.96 KG/M2

## 2021-06-09 DIAGNOSIS — Z34.03 ENCOUNTER FOR SUPERVISION OF NORMAL FIRST PREGNANCY IN THIRD TRIMESTER: Primary | ICD-10-CM

## 2021-06-09 PROCEDURE — 99207 PR PRENATAL VISIT: CPT | Performed by: ADVANCED PRACTICE MIDWIFE

## 2021-06-09 NOTE — PROGRESS NOTES
32w2d  Feeling well, no concerns. Baby very active. No regular contractions, LOF or bleeding. Occasional BH contractions, 2/day. RTC in 2 weeks. MML

## 2021-06-23 ENCOUNTER — PRENATAL OFFICE VISIT (OUTPATIENT)
Dept: MIDWIFE SERVICES | Facility: CLINIC | Age: 31
End: 2021-06-23
Payer: COMMERCIAL

## 2021-06-23 VITALS
BODY MASS INDEX: 29.7 KG/M2 | TEMPERATURE: 97.1 F | DIASTOLIC BLOOD PRESSURE: 77 MMHG | WEIGHT: 184 LBS | HEART RATE: 103 BPM | SYSTOLIC BLOOD PRESSURE: 132 MMHG

## 2021-06-23 DIAGNOSIS — Z34.03 ENCOUNTER FOR SUPERVISION OF NORMAL FIRST PREGNANCY IN THIRD TRIMESTER: Primary | ICD-10-CM

## 2021-06-23 LAB — HGB BLD-MCNC: 11.8 G/DL (ref 11.7–15.7)

## 2021-06-23 PROCEDURE — 36415 COLL VENOUS BLD VENIPUNCTURE: CPT | Performed by: ADVANCED PRACTICE MIDWIFE

## 2021-06-23 PROCEDURE — 99207 PR PRENATAL VISIT: CPT | Performed by: ADVANCED PRACTICE MIDWIFE

## 2021-06-23 PROCEDURE — 99N1025 PR STATISTIC OBHBG - HEMOGLOBIN: Performed by: ADVANCED PRACTICE MIDWIFE

## 2021-06-23 NOTE — PROGRESS NOTES
34w2d  Patient feeling well. Positive fetal movement. Denies water leaking, vaginal bleeding, decreased fetal movement, contraction pain, or headaches.   Took a couple of birthing classes online. A little outdated but felt like the information was still good to have. She is excited for the birth and to see how her body reacts to it. Open to anything for the delivery. Ideally would like to labor without pain medications and to move around. She is not interested in a water birth.   Hemoglobin was never drawn with 3 hr sugar testing. Will get today.   No other questions or concerns today.   Danger signs reviewed, pre-eclampsia signs and symptoms discussed.   Knows when to call triage and has phone numbers.   Follow up in 2 weeks for BSUS, and GBS. Will not need hemoglobin since we are collecting today.   AGUSTIN Blanco CNM

## 2021-07-07 ENCOUNTER — PRENATAL OFFICE VISIT (OUTPATIENT)
Dept: MIDWIFE SERVICES | Facility: CLINIC | Age: 31
End: 2021-07-07
Payer: COMMERCIAL

## 2021-07-07 VITALS
HEIGHT: 66 IN | SYSTOLIC BLOOD PRESSURE: 126 MMHG | HEART RATE: 97 BPM | OXYGEN SATURATION: 96 % | DIASTOLIC BLOOD PRESSURE: 80 MMHG | WEIGHT: 192.6 LBS | BODY MASS INDEX: 30.95 KG/M2

## 2021-07-07 DIAGNOSIS — Z34.03 ENCOUNTER FOR SUPERVISION OF NORMAL FIRST PREGNANCY IN THIRD TRIMESTER: Primary | ICD-10-CM

## 2021-07-07 LAB — HCV AB SERPL QL IA: NONREACTIVE

## 2021-07-07 PROCEDURE — 36415 COLL VENOUS BLD VENIPUNCTURE: CPT | Performed by: ADVANCED PRACTICE MIDWIFE

## 2021-07-07 PROCEDURE — 99207 PR PRENATAL VISIT: CPT | Performed by: ADVANCED PRACTICE MIDWIFE

## 2021-07-07 PROCEDURE — 86803 HEPATITIS C AB TEST: CPT | Performed by: ADVANCED PRACTICE MIDWIFE

## 2021-07-07 PROCEDURE — 87653 STREP B DNA AMP PROBE: CPT | Performed by: ADVANCED PRACTICE MIDWIFE

## 2021-07-07 ASSESSMENT — MIFFLIN-ST. JEOR: SCORE: 1605.38

## 2021-07-07 NOTE — PROGRESS NOTES
36w2d   Patient feeling well. Positive fetal movement. Denies water leaking, vaginal bleeding, decreased fetal movement, contraction pain, or headaches.   Good questions about logistics on who to call, when to call, and where to do. Having their last baby shower this weekend. Getting the house together for the baby, they feel ready. Having some светлана peña contractions but mild and not bothersome.   GBS today. HGB done last visit. Hepatitis C today for prenatal labs, not interested in water birth. BSUS shows cephalic position. No other concerns today.   Danger signs reviewed, pre-eclampsia signs and symptoms discussed.   Knows when to call triage and has phone numbers.   Follow up in 1 week.   AGUSTIN Blanco CNM

## 2021-07-08 LAB
GP B STREP DNA SPEC QL NAA+PROBE: NEGATIVE
SPECIMEN SOURCE: NORMAL

## 2021-07-14 ENCOUNTER — PRENATAL OFFICE VISIT (OUTPATIENT)
Dept: MIDWIFE SERVICES | Facility: CLINIC | Age: 31
End: 2021-07-14
Payer: COMMERCIAL

## 2021-07-14 VITALS
HEART RATE: 105 BPM | SYSTOLIC BLOOD PRESSURE: 119 MMHG | WEIGHT: 189.6 LBS | DIASTOLIC BLOOD PRESSURE: 82 MMHG | BODY MASS INDEX: 30.6 KG/M2 | OXYGEN SATURATION: 96 %

## 2021-07-14 DIAGNOSIS — Z34.03 ENCOUNTER FOR SUPERVISION OF NORMAL FIRST PREGNANCY IN THIRD TRIMESTER: Primary | ICD-10-CM

## 2021-07-14 PROCEDURE — 99207 PR PRENATAL VISIT: CPT | Performed by: ADVANCED PRACTICE MIDWIFE

## 2021-07-14 NOTE — PROGRESS NOTES
"37w2d  Philip is feeling well. Baby is active, denies bleeding, leaking of fluid, headaches, vision changes, new edema. Discussed labor preferences. She is very open to the process and how it unfolds. Not planning to make a \"birth plan\", but to experience labor as it happens. No planning medication, but open to it if needed. Still getting ready at home. Return to care 1 week.  Maxwell Sandoval CNM    "

## 2021-07-21 ENCOUNTER — PRENATAL OFFICE VISIT (OUTPATIENT)
Dept: MIDWIFE SERVICES | Facility: CLINIC | Age: 31
End: 2021-07-21
Payer: COMMERCIAL

## 2021-07-21 VITALS
WEIGHT: 190.1 LBS | BODY MASS INDEX: 30.68 KG/M2 | SYSTOLIC BLOOD PRESSURE: 128 MMHG | HEART RATE: 104 BPM | DIASTOLIC BLOOD PRESSURE: 80 MMHG

## 2021-07-21 DIAGNOSIS — Z34.03 ENCOUNTER FOR SUPERVISION OF NORMAL FIRST PREGNANCY IN THIRD TRIMESTER: Primary | ICD-10-CM

## 2021-07-21 PROCEDURE — 99207 PR PRENATAL VISIT: CPT | Performed by: ADVANCED PRACTICE MIDWIFE

## 2021-07-21 NOTE — PROGRESS NOTES
Pihlip is doing well.  Having some ctx but mild.  Desires SVE that shows a long but softening cervix that is closed consistant with her mild lower abdomen ctx.  Active baby.  Discussed the elements of labor being starts and fits until active labor usually closer to due date.  ASSESSMENT: 38w2d   PLAN: RTC weekly until delivery.   LEILA

## 2021-07-28 ENCOUNTER — PRENATAL OFFICE VISIT (OUTPATIENT)
Dept: MIDWIFE SERVICES | Facility: CLINIC | Age: 31
End: 2021-07-28
Payer: COMMERCIAL

## 2021-07-28 VITALS
WEIGHT: 194.8 LBS | BODY MASS INDEX: 31.31 KG/M2 | HEART RATE: 102 BPM | HEIGHT: 66 IN | DIASTOLIC BLOOD PRESSURE: 79 MMHG | SYSTOLIC BLOOD PRESSURE: 113 MMHG

## 2021-07-28 DIAGNOSIS — Z34.03 ENCOUNTER FOR SUPERVISION OF NORMAL FIRST PREGNANCY IN THIRD TRIMESTER: Primary | ICD-10-CM

## 2021-07-28 PROCEDURE — 99207 PR PRENATAL VISIT: CPT | Performed by: ADVANCED PRACTICE MIDWIFE

## 2021-07-28 ASSESSMENT — MIFFLIN-ST. JEOR: SCORE: 1615.36

## 2021-07-28 NOTE — PROGRESS NOTES
39w2d   Feeling well, waiting for baby.  Denies contractions, LOVF or vaginal bleeding.  Has appt made for next week.  RTC as scheduled.  AGUSTIN Ronquillo CNM

## 2021-08-04 ENCOUNTER — TELEPHONE (OUTPATIENT)
Dept: MIDWIFE SERVICES | Facility: CLINIC | Age: 31
End: 2021-08-04

## 2021-08-04 ENCOUNTER — PRENATAL OFFICE VISIT (OUTPATIENT)
Dept: MIDWIFE SERVICES | Facility: CLINIC | Age: 31
End: 2021-08-04
Payer: COMMERCIAL

## 2021-08-04 VITALS
DIASTOLIC BLOOD PRESSURE: 84 MMHG | SYSTOLIC BLOOD PRESSURE: 134 MMHG | BODY MASS INDEX: 31.64 KG/M2 | WEIGHT: 196 LBS | HEART RATE: 89 BPM | TEMPERATURE: 98.2 F

## 2021-08-04 VITALS
SYSTOLIC BLOOD PRESSURE: 138 MMHG | DIASTOLIC BLOOD PRESSURE: 84 MMHG | TEMPERATURE: 98.2 F | BODY MASS INDEX: 31.64 KG/M2 | HEART RATE: 89 BPM | WEIGHT: 196 LBS

## 2021-08-04 DIAGNOSIS — O48.0 POST TERM PREGNANCY, ANTEPARTUM CONDITION OR COMPLICATION: ICD-10-CM

## 2021-08-04 DIAGNOSIS — Z34.03 ENCOUNTER FOR SUPERVISION OF NORMAL FIRST PREGNANCY, THIRD TRIMESTER: Primary | ICD-10-CM

## 2021-08-04 DIAGNOSIS — N89.8 VAGINAL DISCHARGE: Primary | ICD-10-CM

## 2021-08-04 LAB — RUPTURE OF FETAL MEMBRANES BY ROM PLUS: NEGATIVE

## 2021-08-04 PROCEDURE — 99207 PR PRENATAL VISIT: CPT | Performed by: ADVANCED PRACTICE MIDWIFE

## 2021-08-04 PROCEDURE — 84112 EVAL AMNIOTIC FLUID PROTEIN: CPT | Performed by: ADVANCED PRACTICE MIDWIFE

## 2021-08-04 NOTE — PROGRESS NOTES
40w2d  Feeling well. Discussed postdates testing vs IOL at 41 wks. Ordered posdates testing at 40w6d. Patient thinks she wants to wait for labor but may change her mind. Reports good fetal movement. Denies leaking of fluid, vaginal bleeding, regular uterine contractions, headache or other concerns.  RTC in 5 days [facility]

## 2021-08-04 NOTE — TELEPHONE ENCOUNTER
Per JR, pt to come back to clinic for ROM plus. TC to patient. Pt will head back to clinic.   Niki Fish RN-BSN

## 2021-08-04 NOTE — PROGRESS NOTES
Pt was seen this am for a clinic visit and had her cervix checked.    As she was leaving she noted fluid that made her underwear wet.  She went home to change mir underwear and left for work.  On the way to work she noted that it happened again.    Reports good fetal movement    Denies any change in contraction, has been having irregular светлана peña type contractions    ROM plus collected.    Pt sitting on dry pad, no evidence of gross rupture at this time.    Will call pt with + results, if negative results will be available by Tiinkk[facility]ple.    Even if results come back as negative, if leaking of fluid increases through out the day or couple has questions, encouraged to call to speak with me.    AGUSTIN RonquilloM

## 2021-08-04 NOTE — TELEPHONE ENCOUNTER
Pt was seen for PNV today. Pt is 40w2d. When she was leaving the clinic, she felt fluid and thought she peed her pants. Went home and nothing was continuing to come out. Definitely got her underwear wet. Went to work and now having more fluid. Not having any consistent contractions. Wondering if she needs to come in to be checked. Routing to on-call CNM.  Niki Fish, RN-BSN

## 2021-08-09 ENCOUNTER — ANESTHESIA EVENT (OUTPATIENT)
Dept: OBGYN | Facility: CLINIC | Age: 31
End: 2021-08-09
Payer: COMMERCIAL

## 2021-08-09 ENCOUNTER — ANESTHESIA (OUTPATIENT)
Dept: OBGYN | Facility: CLINIC | Age: 31
End: 2021-08-09
Payer: COMMERCIAL

## 2021-08-09 ENCOUNTER — HOSPITAL ENCOUNTER (OUTPATIENT)
Dept: ULTRASOUND IMAGING | Facility: CLINIC | Age: 31
Discharge: HOME | End: 2021-08-09
Attending: ADVANCED PRACTICE MIDWIFE | Admitting: ADVANCED PRACTICE MIDWIFE
Payer: COMMERCIAL

## 2021-08-09 ENCOUNTER — HOSPITAL ENCOUNTER (INPATIENT)
Facility: CLINIC | Age: 31
LOS: 1 days | Discharge: OTHER INSTITUTION | End: 2021-08-10
Attending: ADVANCED PRACTICE MIDWIFE | Admitting: ADVANCED PRACTICE MIDWIFE
Payer: COMMERCIAL

## 2021-08-09 ENCOUNTER — PRENATAL OFFICE VISIT (OUTPATIENT)
Dept: MIDWIFE SERVICES | Facility: CLINIC | Age: 31
End: 2021-08-09
Payer: COMMERCIAL

## 2021-08-09 VITALS
HEART RATE: 78 BPM | SYSTOLIC BLOOD PRESSURE: 135 MMHG | DIASTOLIC BLOOD PRESSURE: 89 MMHG | WEIGHT: 196.9 LBS | OXYGEN SATURATION: 99 % | BODY MASS INDEX: 31.78 KG/M2

## 2021-08-09 DIAGNOSIS — O48.0 POST TERM PREGNANCY, ANTEPARTUM CONDITION OR COMPLICATION: ICD-10-CM

## 2021-08-09 DIAGNOSIS — Z34.03 ENCOUNTER FOR SUPERVISION OF NORMAL FIRST PREGNANCY IN THIRD TRIMESTER: Primary | ICD-10-CM

## 2021-08-09 PROBLEM — Z36.89 ENCOUNTER FOR TRIAGE IN PREGNANT PATIENT: Status: ACTIVE | Noted: 2021-08-09

## 2021-08-09 LAB
ABO/RH(D): NORMAL
ANTIBODY SCREEN: NEGATIVE
BASOPHILS # BLD AUTO: 0.1 10E3/UL (ref 0–0.2)
BASOPHILS NFR BLD AUTO: 0 %
EOSINOPHIL # BLD AUTO: 0.1 10E3/UL (ref 0–0.7)
EOSINOPHIL NFR BLD AUTO: 0 %
ERYTHROCYTE [DISTWIDTH] IN BLOOD BY AUTOMATED COUNT: 13.4 % (ref 10–15)
HCT VFR BLD AUTO: 37.8 % (ref 35–47)
HGB BLD-MCNC: 12.5 G/DL (ref 11.7–15.7)
IMM GRANULOCYTES # BLD: 0.2 10E3/UL
IMM GRANULOCYTES NFR BLD: 1 %
LYMPHOCYTES # BLD AUTO: 2.1 10E3/UL (ref 0.8–5.3)
LYMPHOCYTES NFR BLD AUTO: 13 %
MCH RBC QN AUTO: 29.1 PG (ref 26.5–33)
MCHC RBC AUTO-ENTMCNC: 33.1 G/DL (ref 31.5–36.5)
MCV RBC AUTO: 88 FL (ref 78–100)
MONOCYTES # BLD AUTO: 1 10E3/UL (ref 0–1.3)
MONOCYTES NFR BLD AUTO: 6 %
NEUTROPHILS # BLD AUTO: 13.6 10E3/UL (ref 1.6–8.3)
NEUTROPHILS NFR BLD AUTO: 80 %
NRBC # BLD AUTO: 0 10E3/UL
NRBC BLD AUTO-RTO: 0 /100
PLATELET # BLD AUTO: 249 10E3/UL (ref 150–450)
RBC # BLD AUTO: 4.3 10E6/UL (ref 3.8–5.2)
RUPTURE OF FETAL MEMBRANES BY ROM PLUS: NEGATIVE
SARS-COV-2 RNA RESP QL NAA+PROBE: NEGATIVE
SPECIMEN EXPIRATION DATE: NORMAL
WBC # BLD AUTO: 17 10E3/UL (ref 4–11)

## 2021-08-09 PROCEDURE — 250N000011 HC RX IP 250 OP 636: Performed by: ADVANCED PRACTICE MIDWIFE

## 2021-08-09 PROCEDURE — 250N000011 HC RX IP 250 OP 636: Performed by: STUDENT IN AN ORGANIZED HEALTH CARE EDUCATION/TRAINING PROGRAM

## 2021-08-09 PROCEDURE — 250N000011 HC RX IP 250 OP 636: Performed by: NURSE ANESTHETIST, CERTIFIED REGISTERED

## 2021-08-09 PROCEDURE — 120N000002 HC R&B MED SURG/OB UMMC

## 2021-08-09 PROCEDURE — 76815 OB US LIMITED FETUS(S): CPT

## 2021-08-09 PROCEDURE — 999N000141 HC STATISTIC PRE-PROCEDURE NURSING ASSESSMENT: Performed by: OBSTETRICS & GYNECOLOGY

## 2021-08-09 PROCEDURE — 258N000003 HC RX IP 258 OP 636: Performed by: STUDENT IN AN ORGANIZED HEALTH CARE EDUCATION/TRAINING PROGRAM

## 2021-08-09 PROCEDURE — 86780 TREPONEMA PALLIDUM: CPT | Performed by: ADVANCED PRACTICE MIDWIFE

## 2021-08-09 PROCEDURE — 84112 EVAL AMNIOTIC FLUID PROTEIN: CPT | Performed by: ADVANCED PRACTICE MIDWIFE

## 2021-08-09 PROCEDURE — 250N000011 HC RX IP 250 OP 636: Performed by: ANESTHESIOLOGY

## 2021-08-09 PROCEDURE — 272N000001 HC OR GENERAL SUPPLY STERILE: Performed by: OBSTETRICS & GYNECOLOGY

## 2021-08-09 PROCEDURE — 86900 BLOOD TYPING SEROLOGIC ABO: CPT | Performed by: ADVANCED PRACTICE MIDWIFE

## 2021-08-09 PROCEDURE — 258N000003 HC RX IP 258 OP 636: Performed by: NURSE ANESTHETIST, CERTIFIED REGISTERED

## 2021-08-09 PROCEDURE — 99207 PR PRENATAL VISIT: CPT | Performed by: ADVANCED PRACTICE MIDWIFE

## 2021-08-09 PROCEDURE — 59515 CESAREAN DELIVERY: CPT | Mod: GC | Performed by: OBSTETRICS & GYNECOLOGY

## 2021-08-09 PROCEDURE — 250N000009 HC RX 250: Performed by: NURSE ANESTHETIST, CERTIFIED REGISTERED

## 2021-08-09 PROCEDURE — C9290 INJ, BUPIVACAINE LIPOSOME: HCPCS | Performed by: ANESTHESIOLOGY

## 2021-08-09 PROCEDURE — 3E033VJ INTRODUCTION OF OTHER HORMONE INTO PERIPHERAL VEIN, PERCUTANEOUS APPROACH: ICD-10-PCS | Performed by: ADVANCED PRACTICE MIDWIFE

## 2021-08-09 PROCEDURE — 76815 OB US LIMITED FETUS(S): CPT | Mod: 26 | Performed by: RADIOLOGY

## 2021-08-09 PROCEDURE — 710N000010 HC RECOVERY PHASE 1, LEVEL 2, PER MIN: Performed by: OBSTETRICS & GYNECOLOGY

## 2021-08-09 PROCEDURE — 258N000003 HC RX IP 258 OP 636: Performed by: ADVANCED PRACTICE MIDWIFE

## 2021-08-09 PROCEDURE — 360N000076 HC SURGERY LEVEL 3, PER MIN: Performed by: OBSTETRICS & GYNECOLOGY

## 2021-08-09 PROCEDURE — 85025 COMPLETE CBC W/AUTO DIFF WBC: CPT | Performed by: ADVANCED PRACTICE MIDWIFE

## 2021-08-09 PROCEDURE — 87635 SARS-COV-2 COVID-19 AMP PRB: CPT | Performed by: ADVANCED PRACTICE MIDWIFE

## 2021-08-09 PROCEDURE — 271N000001 HC OR GENERAL SUPPLY NON-STERILE: Performed by: OBSTETRICS & GYNECOLOGY

## 2021-08-09 PROCEDURE — 370N000017 HC ANESTHESIA TECHNICAL FEE, PER MIN: Performed by: OBSTETRICS & GYNECOLOGY

## 2021-08-09 PROCEDURE — 59426 ANTEPARTUM CARE ONLY: CPT | Performed by: ADVANCED PRACTICE MIDWIFE

## 2021-08-09 RX ORDER — IBUPROFEN 600 MG/1
600 TABLET, FILM COATED ORAL
Status: DISCONTINUED | OUTPATIENT
Start: 2021-08-09 | End: 2021-08-10

## 2021-08-09 RX ORDER — OXYTOCIN/0.9 % SODIUM CHLORIDE 30/500 ML
340 PLASTIC BAG, INJECTION (ML) INTRAVENOUS CONTINUOUS PRN
Status: DISCONTINUED | OUTPATIENT
Start: 2021-08-09 | End: 2021-08-10

## 2021-08-09 RX ORDER — OXYTOCIN/0.9 % SODIUM CHLORIDE 30/500 ML
100-340 PLASTIC BAG, INJECTION (ML) INTRAVENOUS CONTINUOUS PRN
Status: DISCONTINUED | OUTPATIENT
Start: 2021-08-09 | End: 2021-08-10

## 2021-08-09 RX ORDER — OXYTOCIN/0.9 % SODIUM CHLORIDE 30/500 ML
PLASTIC BAG, INJECTION (ML) INTRAVENOUS
Status: DISCONTINUED
Start: 2021-08-09 | End: 2021-08-09 | Stop reason: HOSPADM

## 2021-08-09 RX ORDER — PROCHLORPERAZINE MALEATE 10 MG
10 TABLET ORAL EVERY 6 HOURS PRN
Status: DISCONTINUED | OUTPATIENT
Start: 2021-08-09 | End: 2021-08-10

## 2021-08-09 RX ORDER — METHYLERGONOVINE MALEATE 0.2 MG/ML
200 INJECTION INTRAVENOUS
Status: DISCONTINUED | OUTPATIENT
Start: 2021-08-09 | End: 2021-08-10

## 2021-08-09 RX ORDER — CITRIC ACID/SODIUM CITRATE 334-500MG
30 SOLUTION, ORAL ORAL
Status: DISCONTINUED | OUTPATIENT
Start: 2021-08-09 | End: 2021-08-10

## 2021-08-09 RX ORDER — TRANEXAMIC ACID 10 MG/ML
1 INJECTION, SOLUTION INTRAVENOUS EVERY 30 MIN PRN
Status: DISCONTINUED | OUTPATIENT
Start: 2021-08-09 | End: 2021-08-10

## 2021-08-09 RX ORDER — PROCHLORPERAZINE MALEATE 10 MG
10 TABLET ORAL EVERY 6 HOURS PRN
Status: DISCONTINUED | OUTPATIENT
Start: 2021-08-09 | End: 2021-08-09 | Stop reason: HOSPADM

## 2021-08-09 RX ORDER — OXYTOCIN 10 [USP'U]/ML
10 INJECTION, SOLUTION INTRAMUSCULAR; INTRAVENOUS
Status: DISCONTINUED | OUTPATIENT
Start: 2021-08-09 | End: 2021-08-10

## 2021-08-09 RX ORDER — FENTANYL CITRATE 50 UG/ML
50-100 INJECTION, SOLUTION INTRAMUSCULAR; INTRAVENOUS
Status: DISCONTINUED | OUTPATIENT
Start: 2021-08-09 | End: 2021-08-10

## 2021-08-09 RX ORDER — FENTANYL CITRATE-0.9 % NACL/PF 10 MCG/ML
PLASTIC BAG, INJECTION (ML) INTRAVENOUS CONTINUOUS PRN
Status: DISCONTINUED | OUTPATIENT
Start: 2021-08-09 | End: 2021-08-09

## 2021-08-09 RX ORDER — METOCLOPRAMIDE HYDROCHLORIDE 5 MG/ML
10 INJECTION INTRAMUSCULAR; INTRAVENOUS EVERY 6 HOURS PRN
Status: DISCONTINUED | OUTPATIENT
Start: 2021-08-09 | End: 2021-08-10

## 2021-08-09 RX ORDER — SODIUM CHLORIDE, SODIUM LACTATE, POTASSIUM CHLORIDE, CALCIUM CHLORIDE 600; 310; 30; 20 MG/100ML; MG/100ML; MG/100ML; MG/100ML
INJECTION, SOLUTION INTRAVENOUS CONTINUOUS
Status: DISCONTINUED | OUTPATIENT
Start: 2021-08-09 | End: 2021-08-10

## 2021-08-09 RX ORDER — PROCHLORPERAZINE 25 MG
25 SUPPOSITORY, RECTAL RECTAL EVERY 12 HOURS PRN
Status: DISCONTINUED | OUTPATIENT
Start: 2021-08-09 | End: 2021-08-10

## 2021-08-09 RX ORDER — METOCLOPRAMIDE HYDROCHLORIDE 5 MG/ML
10 INJECTION INTRAMUSCULAR; INTRAVENOUS EVERY 6 HOURS PRN
Status: DISCONTINUED | OUTPATIENT
Start: 2021-08-09 | End: 2021-08-09 | Stop reason: HOSPADM

## 2021-08-09 RX ORDER — MISOPROSTOL 200 UG/1
400 TABLET ORAL
Status: DISCONTINUED | OUTPATIENT
Start: 2021-08-09 | End: 2021-08-10

## 2021-08-09 RX ORDER — KETOROLAC TROMETHAMINE 30 MG/ML
30 INJECTION, SOLUTION INTRAMUSCULAR; INTRAVENOUS
Status: DISCONTINUED | OUTPATIENT
Start: 2021-08-09 | End: 2021-08-10

## 2021-08-09 RX ORDER — MISOPROSTOL 200 UG/1
TABLET ORAL
Status: DISCONTINUED
Start: 2021-08-09 | End: 2021-08-09 | Stop reason: HOSPADM

## 2021-08-09 RX ORDER — SODIUM CHLORIDE, SODIUM LACTATE, POTASSIUM CHLORIDE, CALCIUM CHLORIDE 600; 310; 30; 20 MG/100ML; MG/100ML; MG/100ML; MG/100ML
INJECTION, SOLUTION INTRAVENOUS CONTINUOUS PRN
Status: DISCONTINUED | OUTPATIENT
Start: 2021-08-09 | End: 2021-08-09

## 2021-08-09 RX ORDER — CEFAZOLIN SODIUM 2 G/100ML
2 INJECTION, SOLUTION INTRAVENOUS SEE ADMIN INSTRUCTIONS
Status: DISCONTINUED | OUTPATIENT
Start: 2021-08-09 | End: 2021-08-10

## 2021-08-09 RX ORDER — BUPIVACAINE HYDROCHLORIDE 7.5 MG/ML
INJECTION, SOLUTION INTRASPINAL
Status: COMPLETED | OUTPATIENT
Start: 2021-08-09 | End: 2021-08-09

## 2021-08-09 RX ORDER — LIDOCAINE 40 MG/G
CREAM TOPICAL
Status: DISCONTINUED | OUTPATIENT
Start: 2021-08-09 | End: 2021-08-09 | Stop reason: HOSPADM

## 2021-08-09 RX ORDER — ONDANSETRON 2 MG/ML
INJECTION INTRAMUSCULAR; INTRAVENOUS PRN
Status: DISCONTINUED | OUTPATIENT
Start: 2021-08-09 | End: 2021-08-09

## 2021-08-09 RX ORDER — NALOXONE HYDROCHLORIDE 0.4 MG/ML
0.2 INJECTION, SOLUTION INTRAMUSCULAR; INTRAVENOUS; SUBCUTANEOUS
Status: DISCONTINUED | OUTPATIENT
Start: 2021-08-09 | End: 2021-08-10

## 2021-08-09 RX ORDER — ONDANSETRON 2 MG/ML
4 INJECTION INTRAMUSCULAR; INTRAVENOUS EVERY 6 HOURS PRN
Status: DISCONTINUED | OUTPATIENT
Start: 2021-08-09 | End: 2021-08-09 | Stop reason: HOSPADM

## 2021-08-09 RX ORDER — CEFAZOLIN SODIUM 2 G/100ML
2 INJECTION, SOLUTION INTRAVENOUS
Status: DISCONTINUED | OUTPATIENT
Start: 2021-08-09 | End: 2021-08-10

## 2021-08-09 RX ORDER — CEFAZOLIN SODIUM 1 G/3ML
INJECTION, POWDER, FOR SOLUTION INTRAMUSCULAR; INTRAVENOUS PRN
Status: DISCONTINUED | OUTPATIENT
Start: 2021-08-09 | End: 2021-08-09

## 2021-08-09 RX ORDER — CARBOPROST TROMETHAMINE 250 UG/ML
250 INJECTION, SOLUTION INTRAMUSCULAR
Status: DISCONTINUED | OUTPATIENT
Start: 2021-08-09 | End: 2021-08-10

## 2021-08-09 RX ORDER — MISOPROSTOL 200 UG/1
800 TABLET ORAL
Status: DISCONTINUED | OUTPATIENT
Start: 2021-08-09 | End: 2021-08-10

## 2021-08-09 RX ORDER — ONDANSETRON 4 MG/1
4 TABLET, ORALLY DISINTEGRATING ORAL EVERY 6 HOURS PRN
Status: DISCONTINUED | OUTPATIENT
Start: 2021-08-09 | End: 2021-08-10

## 2021-08-09 RX ORDER — NALOXONE HYDROCHLORIDE 0.4 MG/ML
0.4 INJECTION, SOLUTION INTRAMUSCULAR; INTRAVENOUS; SUBCUTANEOUS
Status: DISCONTINUED | OUTPATIENT
Start: 2021-08-09 | End: 2021-08-10

## 2021-08-09 RX ORDER — METOCLOPRAMIDE 10 MG/1
10 TABLET ORAL EVERY 6 HOURS PRN
Status: DISCONTINUED | OUTPATIENT
Start: 2021-08-09 | End: 2021-08-09 | Stop reason: HOSPADM

## 2021-08-09 RX ORDER — ONDANSETRON 2 MG/ML
4 INJECTION INTRAMUSCULAR; INTRAVENOUS EVERY 6 HOURS PRN
Status: DISCONTINUED | OUTPATIENT
Start: 2021-08-09 | End: 2021-08-10

## 2021-08-09 RX ORDER — ONDANSETRON 4 MG/1
4 TABLET, ORALLY DISINTEGRATING ORAL EVERY 6 HOURS PRN
Status: DISCONTINUED | OUTPATIENT
Start: 2021-08-09 | End: 2021-08-09 | Stop reason: HOSPADM

## 2021-08-09 RX ORDER — PROCHLORPERAZINE 25 MG
25 SUPPOSITORY, RECTAL RECTAL EVERY 12 HOURS PRN
Status: DISCONTINUED | OUTPATIENT
Start: 2021-08-09 | End: 2021-08-09 | Stop reason: HOSPADM

## 2021-08-09 RX ORDER — ACETAMINOPHEN 325 MG/1
975 TABLET ORAL ONCE
Status: DISCONTINUED | OUTPATIENT
Start: 2021-08-09 | End: 2021-08-10

## 2021-08-09 RX ORDER — NALOXONE HYDROCHLORIDE 0.4 MG/ML
0.2 INJECTION, SOLUTION INTRAMUSCULAR; INTRAVENOUS; SUBCUTANEOUS
Status: DISCONTINUED | OUTPATIENT
Start: 2021-08-09 | End: 2021-08-09

## 2021-08-09 RX ORDER — LIDOCAINE 40 MG/G
CREAM TOPICAL
Status: DISCONTINUED | OUTPATIENT
Start: 2021-08-09 | End: 2021-08-10

## 2021-08-09 RX ORDER — BUPIVACAINE HYDROCHLORIDE 2.5 MG/ML
INJECTION, SOLUTION EPIDURAL; INFILTRATION; INTRACAUDAL
Status: COMPLETED | OUTPATIENT
Start: 2021-08-09 | End: 2021-08-09

## 2021-08-09 RX ORDER — MORPHINE SULFATE 1 MG/ML
INJECTION, SOLUTION EPIDURAL; INTRATHECAL; INTRAVENOUS
Status: COMPLETED | OUTPATIENT
Start: 2021-08-09 | End: 2021-08-09

## 2021-08-09 RX ORDER — LIDOCAINE HYDROCHLORIDE 10 MG/ML
INJECTION, SOLUTION EPIDURAL; INFILTRATION; INTRACAUDAL; PERINEURAL
Status: DISCONTINUED
Start: 2021-08-09 | End: 2021-08-09 | Stop reason: HOSPADM

## 2021-08-09 RX ORDER — AZITHROMYCIN 500 MG/5ML
500 INJECTION, POWDER, LYOPHILIZED, FOR SOLUTION INTRAVENOUS
Status: COMPLETED | OUTPATIENT
Start: 2021-08-09 | End: 2021-08-09

## 2021-08-09 RX ORDER — OXYTOCIN 10 [USP'U]/ML
INJECTION, SOLUTION INTRAMUSCULAR; INTRAVENOUS
Status: DISCONTINUED
Start: 2021-08-09 | End: 2021-08-09 | Stop reason: HOSPADM

## 2021-08-09 RX ORDER — NALOXONE HYDROCHLORIDE 0.4 MG/ML
0.4 INJECTION, SOLUTION INTRAMUSCULAR; INTRAVENOUS; SUBCUTANEOUS
Status: DISCONTINUED | OUTPATIENT
Start: 2021-08-09 | End: 2021-08-09

## 2021-08-09 RX ORDER — FENTANYL CITRATE 50 UG/ML
INJECTION, SOLUTION INTRAMUSCULAR; INTRAVENOUS
Status: COMPLETED | OUTPATIENT
Start: 2021-08-09 | End: 2021-08-09

## 2021-08-09 RX ORDER — METOCLOPRAMIDE 10 MG/1
10 TABLET ORAL EVERY 6 HOURS PRN
Status: DISCONTINUED | OUTPATIENT
Start: 2021-08-09 | End: 2021-08-10

## 2021-08-09 RX ORDER — OXYTOCIN/0.9 % SODIUM CHLORIDE 30/500 ML
PLASTIC BAG, INJECTION (ML) INTRAVENOUS CONTINUOUS PRN
Status: DISCONTINUED | OUTPATIENT
Start: 2021-08-09 | End: 2021-08-09

## 2021-08-09 RX ADMIN — BUPIVACAINE 20 ML: 13.3 INJECTION, SUSPENSION, LIPOSOMAL INFILTRATION at 22:30

## 2021-08-09 RX ADMIN — OXYTOCIN-SODIUM CHLORIDE 0.9% IV SOLN 30 UNIT/500ML 300 ML/HR: 30-0.9/5 SOLUTION at 21:29

## 2021-08-09 RX ADMIN — CEFAZOLIN 2 G: 1 INJECTION, POWDER, FOR SOLUTION INTRAMUSCULAR; INTRAVENOUS at 21:10

## 2021-08-09 RX ADMIN — PHENYLEPHRINE HYDROCHLORIDE 150 MCG: 10 INJECTION INTRAVENOUS at 21:51

## 2021-08-09 RX ADMIN — ONDANSETRON 4 MG: 2 INJECTION INTRAMUSCULAR; INTRAVENOUS at 21:40

## 2021-08-09 RX ADMIN — FENTANYL CITRATE 10 MCG: 50 INJECTION, SOLUTION INTRAMUSCULAR; INTRAVENOUS at 21:19

## 2021-08-09 RX ADMIN — Medication 50 MCG/MIN: at 21:19

## 2021-08-09 RX ADMIN — Medication 500 MG: at 21:19

## 2021-08-09 RX ADMIN — KETOROLAC TROMETHAMINE 30 MG: 30 INJECTION, SOLUTION INTRAMUSCULAR; INTRAVENOUS at 23:07

## 2021-08-09 RX ADMIN — SODIUM CHLORIDE, POTASSIUM CHLORIDE, SODIUM LACTATE AND CALCIUM CHLORIDE 500 ML: 600; 310; 30; 20 INJECTION, SOLUTION INTRAVENOUS at 23:02

## 2021-08-09 RX ADMIN — BUPIVACAINE HYDROCHLORIDE IN DEXTROSE 1.6 ML: 7.5 INJECTION, SOLUTION SUBARACHNOID at 21:19

## 2021-08-09 RX ADMIN — BUPIVACAINE HYDROCHLORIDE 20 ML: 2.5 INJECTION, SOLUTION EPIDURAL; INFILTRATION; INTRACAUDAL at 22:30

## 2021-08-09 RX ADMIN — MORPHINE SULFATE 0.1 MG: 1 INJECTION EPIDURAL; INTRATHECAL; INTRAVENOUS at 21:19

## 2021-08-09 RX ADMIN — PHENYLEPHRINE HYDROCHLORIDE 100 MCG: 10 INJECTION INTRAVENOUS at 21:57

## 2021-08-09 RX ADMIN — PHENYLEPHRINE HYDROCHLORIDE 100 MCG: 10 INJECTION INTRAVENOUS at 21:22

## 2021-08-09 RX ADMIN — SODIUM CHLORIDE, POTASSIUM CHLORIDE, SODIUM LACTATE AND CALCIUM CHLORIDE: 600; 310; 30; 20 INJECTION, SOLUTION INTRAVENOUS at 22:08

## 2021-08-09 RX ADMIN — SODIUM CHLORIDE, POTASSIUM CHLORIDE, SODIUM LACTATE AND CALCIUM CHLORIDE 500 ML: 600; 310; 30; 20 INJECTION, SOLUTION INTRAVENOUS at 20:00

## 2021-08-09 RX ADMIN — SODIUM CHLORIDE, POTASSIUM CHLORIDE, SODIUM LACTATE AND CALCIUM CHLORIDE: 600; 310; 30; 20 INJECTION, SOLUTION INTRAVENOUS at 21:02

## 2021-08-09 ASSESSMENT — ACTIVITIES OF DAILY LIVING (ADL)
FALL_HISTORY_WITHIN_LAST_SIX_MONTHS: NO
TOILETING_ISSUES: NO

## 2021-08-09 ASSESSMENT — MIFFLIN-ST. JEOR: SCORE: 1624.63

## 2021-08-09 NOTE — PROGRESS NOTES
Seen for post dates testing today.  Pt had U/S ISREAL of 10.2 and MVP 5.6.      On monitor baseline min variability @ 150, within 10 min of being on the monitor noted a prolonged decel with a jett of 60 with slow recovery, followed by a late decel then returned to 150 with no further decels in the following 7 mins  before pt escorted to the hospital.  Pt ly q 2-5 mins.  Report given to Birthplace charge nurse and to CNM on call.  AGUSTIN Ronquillo CNM

## 2021-08-09 NOTE — H&P
Philip Pfeiffer is a 30 year old female    Patient's last menstrual period was 10/26/2020., Estimated Date of Delivery: Aug 2, 2021 is calculated from lmp and verified with U/S     Pt is admitted to the Birthplace on 2021 at 5:37 PM     in early labor.  Membranes are intact    HPI: Philip was sent from the clinic after during an NST she had a prolonged FHT deceleration with a jett of 60 followed by a late decel. She is having contractions which are mildly uncomfortable like period cramps. Baby is active.     PRENATAL COURSE  Prenatal care began at 10 wks gestation for a total of 15 prenatal visits.  Total wt gain 29  Prenatal vital signs WNL  Prenatal course was essentially uncomplicated    HISTORIES  Allergies   Allergen Reactions     Vancomycin Hives and Itching     History reviewed. No pertinent past medical history.  Past Surgical History:   Procedure Laterality Date     ORTHOPEDIC SURGERY Right     Right 2nd and 3rd metatarsal ORIF age 14, with subsequent osteomyelitis     TONSILLECTOMY & ADENOIDECTOMY       wisdom teeth       Family History   Problem Relation Age of Onset     Diabetes Type 2  Mother      Obesity Mother      Sjogren's Mother      Sjogren's Maternal Grandmother      Social History     Tobacco Use     Smoking status: Never Smoker     Smokeless tobacco: Never Used   Substance Use Topics     Alcohol use: Not Currently     Comment: about 4 glasses wine per week     OB History    Para Term  AB Living   1 0 0 0 0 0   SAB TAB Ectopic Multiple Live Births   0 0 0 0 0      # Outcome Date GA Lbr Nikolas/2nd Weight Sex Delivery Anes PTL Lv   1 Current                LABS:       Lab Results   Component Value Date    ABO A 2021       Lab Results   Component Value Date    RH Pos 2021     Rhogam not indicated  Rubella immune   Treponema Pallidum Antibody  Negative    HIV    Non-reactive   Lab Results   Component Value Date    HGB 11.8 2021      Lab Results   Component Value  "Date    HEPBANG Negative (Non Reactive) 2021     Lab Results   Component Value Date    GBS Negative 2021     other     ROS  Pt denies significant constitutional symptoms including fever and/or malaise.  Pt denies significant respiratory, cardiovacular, GI, or muscular/skeletal complaints.      PHYSICAL EXAM:  /75   Temp 98.2  F (36.8  C) (Oral)   Resp 18   Ht 1.676 m (5' 5.98\")   Wt 89.3 kg (196 lb 14.4 oz)   LMP 10/26/2020   BMI 31.80 kg/m    General appearance healthy, alert and active   Neuro:  denies headache and visual disturbances  Psych: Mentation normal and bright   Legs: 2+/2+, no clonus, no edema       Abdomen: gravid, single vertex fetus, non-tender, EFW 7.5 lbs. Pt is ly every 3-5 minutes, lasting 60 seconds and palpates mild    FETAL HEART TONES: baseline 150 with moderate FHRV variability and scant accelerations. No decelerations present since NST in clinic    PELVIC EXAM: 3/ 70% / -2  BLOODY SHOW:: no  Membranes as listed above    ASSESSMENT:  IUP @ 41 wks gestation  in early labor  NST  non-reactive   Parity: Primip  GBS negative and membranes intact verified by rom plus    PLAN:  Admit - see IP orders  Labor induction with Pitocin  Anticipate   Pt is on medicare -  Nuvia Beltre CNM, APRN ANKITAM        "

## 2021-08-09 NOTE — PROVIDER NOTIFICATION
08/09/21 1727   Provider Notification   Provider Name/Title Nissa Beltre CNM   Method of Notification At Bedside   Request Evaluate in Person   Notification Reason Patient Arrived;Labor Status;SVE   CNM at bedside, performing SVE: 3/70/-2,mid,soft, cephalic. ROM Plus result negative, and recommendation per CNM to admit patient for labor induction with Pitocin, due to post dates and and deceleration in fetal HR while patient was at clinic earlier. Plan for patient to eat dinner and to start IV and draw admission labs.Patient and spouse verbalizing agreement with plan.

## 2021-08-09 NOTE — PROVIDER NOTIFICATION
21 1700   Provider Notification   Provider Name/Title Nissa Beltre CNM   Method of Notification At Bedside   Request Evaluate in Person   Notification Reason Patient Arrived     Data: Patient presented to [facility] at 1606.   Reason for maternal/fetal assessment per patient is Rule out rupture of membranes  .  Patient is a . Prenatal record reviewed.      OB History    Para Term  AB Living   1 0 0 0 0 0   SAB TAB Ectopic Multiple Live Births   0 0 0 0 0      # Outcome Date GA Lbr Nikolas/2nd Weight Sex Delivery Anes PTL Lv   1 Current            . Medical history: History reviewed. No pertinent past medical history. Gestational Age 41w0d. VSS. Fetal movement present. Patient denies  Backache, pelvic pressure, UTI symptoms, GI problems, bloody show, vaginal bleeding, edema, headache, visual disturbances, epigastric or URQ pain, abdominal pain, rupture of membranes. Patient reports increased mucus discharge today and cramping. Support person Luke present.  Action: Verbal consent for EFM. Triage assessment completed. EFM applied for fetal assessment. Uterine assessment. Fetal assessment: Presumed adequate fetal oxygenation documented (see flow record).   Response: Nissa Beltre CNM, informed of patient's arrival and above information. Plan per provider is for Rom Plus and Covid tests to be sent, and will come back to check SVE and discuss plan after ROM Plus result back. Patient verbalized agreement with plan. Patient transferred to room 481 ambulatory, oriented to room and call light.

## 2021-08-10 ENCOUNTER — HOSPITAL ENCOUNTER (INPATIENT)
Facility: CLINIC | Age: 31
LOS: 2 days | Discharge: HOME HEALTH | DRG: 776 | End: 2021-08-12
Attending: OBSTETRICS & GYNECOLOGY | Admitting: OBSTETRICS & GYNECOLOGY
Payer: COMMERCIAL

## 2021-08-10 VITALS
DIASTOLIC BLOOD PRESSURE: 74 MMHG | SYSTOLIC BLOOD PRESSURE: 111 MMHG | HEART RATE: 93 BPM | BODY MASS INDEX: 31.64 KG/M2 | OXYGEN SATURATION: 97 % | WEIGHT: 196.9 LBS | HEIGHT: 66 IN | TEMPERATURE: 98.6 F | RESPIRATION RATE: 16 BRPM

## 2021-08-10 LAB
HGB BLD-MCNC: 9.8 G/DL (ref 11.7–15.7)
T PALLIDUM AB SER QL: NONREACTIVE

## 2021-08-10 PROCEDURE — 85018 HEMOGLOBIN: CPT | Performed by: OBSTETRICS & GYNECOLOGY

## 2021-08-10 PROCEDURE — 88307 TISSUE EXAM BY PATHOLOGIST: CPT | Mod: TC | Performed by: STUDENT IN AN ORGANIZED HEALTH CARE EDUCATION/TRAINING PROGRAM

## 2021-08-10 PROCEDURE — 36415 COLL VENOUS BLD VENIPUNCTURE: CPT | Performed by: OBSTETRICS & GYNECOLOGY

## 2021-08-10 PROCEDURE — 250N000013 HC RX MED GY IP 250 OP 250 PS 637: Performed by: OBSTETRICS & GYNECOLOGY

## 2021-08-10 PROCEDURE — 250N000009 HC RX 250: Performed by: STUDENT IN AN ORGANIZED HEALTH CARE EDUCATION/TRAINING PROGRAM

## 2021-08-10 PROCEDURE — 120N000012 HC R&B POSTPARTUM

## 2021-08-10 PROCEDURE — 250N000011 HC RX IP 250 OP 636: Performed by: OBSTETRICS & GYNECOLOGY

## 2021-08-10 RX ORDER — METHYLERGONOVINE MALEATE 0.2 MG/ML
200 INJECTION INTRAVENOUS
Status: DISCONTINUED | OUTPATIENT
Start: 2021-08-10 | End: 2021-08-10 | Stop reason: HOSPADM

## 2021-08-10 RX ORDER — ONDANSETRON 2 MG/ML
4 INJECTION INTRAMUSCULAR; INTRAVENOUS EVERY 6 HOURS PRN
Status: DISCONTINUED | OUTPATIENT
Start: 2021-08-10 | End: 2021-08-10 | Stop reason: HOSPADM

## 2021-08-10 RX ORDER — LIDOCAINE 40 MG/G
CREAM TOPICAL
Status: DISCONTINUED | OUTPATIENT
Start: 2021-08-10 | End: 2021-08-10 | Stop reason: HOSPADM

## 2021-08-10 RX ORDER — AMOXICILLIN 250 MG
1 CAPSULE ORAL 2 TIMES DAILY
Status: DISCONTINUED | OUTPATIENT
Start: 2021-08-10 | End: 2021-08-12 | Stop reason: HOSPADM

## 2021-08-10 RX ORDER — NALOXONE HYDROCHLORIDE 0.4 MG/ML
0.4 INJECTION, SOLUTION INTRAMUSCULAR; INTRAVENOUS; SUBCUTANEOUS
Status: DISCONTINUED | OUTPATIENT
Start: 2021-08-10 | End: 2021-08-10 | Stop reason: HOSPADM

## 2021-08-10 RX ORDER — OXYCODONE HYDROCHLORIDE 5 MG/1
5 TABLET ORAL EVERY 4 HOURS PRN
Status: DISCONTINUED | OUTPATIENT
Start: 2021-08-10 | End: 2021-08-10 | Stop reason: HOSPADM

## 2021-08-10 RX ORDER — PROCHLORPERAZINE MALEATE 10 MG
10 TABLET ORAL EVERY 6 HOURS PRN
Status: DISCONTINUED | OUTPATIENT
Start: 2021-08-10 | End: 2021-08-10 | Stop reason: HOSPADM

## 2021-08-10 RX ORDER — SIMETHICONE 80 MG
80 TABLET,CHEWABLE ORAL 4 TIMES DAILY PRN
Status: DISCONTINUED | OUTPATIENT
Start: 2021-08-10 | End: 2021-08-12 | Stop reason: HOSPADM

## 2021-08-10 RX ORDER — IBUPROFEN 400 MG/1
800 TABLET, FILM COATED ORAL EVERY 6 HOURS PRN
Status: DISCONTINUED | OUTPATIENT
Start: 2021-08-11 | End: 2021-08-12 | Stop reason: HOSPADM

## 2021-08-10 RX ORDER — AMOXICILLIN 250 MG
2 CAPSULE ORAL 2 TIMES DAILY
Status: DISCONTINUED | OUTPATIENT
Start: 2021-08-10 | End: 2021-08-12 | Stop reason: HOSPADM

## 2021-08-10 RX ORDER — TRANEXAMIC ACID 10 MG/ML
1 INJECTION, SOLUTION INTRAVENOUS EVERY 30 MIN PRN
Status: DISCONTINUED | OUTPATIENT
Start: 2021-08-10 | End: 2021-08-12 | Stop reason: HOSPADM

## 2021-08-10 RX ORDER — KETOROLAC TROMETHAMINE 30 MG/ML
30 INJECTION, SOLUTION INTRAMUSCULAR; INTRAVENOUS EVERY 6 HOURS
Status: COMPLETED | OUTPATIENT
Start: 2021-08-10 | End: 2021-08-10

## 2021-08-10 RX ORDER — ACETAMINOPHEN 325 MG/1
650 TABLET ORAL EVERY 6 HOURS PRN
Qty: 100 TABLET | Refills: 0 | Status: SHIPPED | OUTPATIENT
Start: 2021-08-10 | End: 2023-04-19

## 2021-08-10 RX ORDER — DEXTROSE, SODIUM CHLORIDE, SODIUM LACTATE, POTASSIUM CHLORIDE, AND CALCIUM CHLORIDE 5; .6; .31; .03; .02 G/100ML; G/100ML; G/100ML; G/100ML; G/100ML
INJECTION, SOLUTION INTRAVENOUS CONTINUOUS
Status: DISCONTINUED | OUTPATIENT
Start: 2021-08-10 | End: 2021-08-10 | Stop reason: HOSPADM

## 2021-08-10 RX ORDER — OXYTOCIN 10 [USP'U]/ML
10 INJECTION, SOLUTION INTRAMUSCULAR; INTRAVENOUS
Status: DISCONTINUED | OUTPATIENT
Start: 2021-08-10 | End: 2021-08-10 | Stop reason: HOSPADM

## 2021-08-10 RX ORDER — LIDOCAINE 40 MG/G
CREAM TOPICAL
Status: DISCONTINUED | OUTPATIENT
Start: 2021-08-10 | End: 2021-08-12 | Stop reason: HOSPADM

## 2021-08-10 RX ORDER — DIPHENHYDRAMINE HYDROCHLORIDE 50 MG/ML
25 INJECTION INTRAMUSCULAR; INTRAVENOUS EVERY 6 HOURS PRN
Status: DISCONTINUED | OUTPATIENT
Start: 2021-08-10 | End: 2021-08-12 | Stop reason: HOSPADM

## 2021-08-10 RX ORDER — PROCHLORPERAZINE 25 MG
25 SUPPOSITORY, RECTAL RECTAL EVERY 12 HOURS PRN
Status: DISCONTINUED | OUTPATIENT
Start: 2021-08-10 | End: 2021-08-10 | Stop reason: HOSPADM

## 2021-08-10 RX ORDER — HYDROCORTISONE 2.5 %
CREAM (GRAM) TOPICAL 3 TIMES DAILY PRN
Status: DISCONTINUED | OUTPATIENT
Start: 2021-08-10 | End: 2021-08-10 | Stop reason: HOSPADM

## 2021-08-10 RX ORDER — ACETAMINOPHEN 325 MG/1
975 TABLET ORAL EVERY 6 HOURS
Status: DISCONTINUED | OUTPATIENT
Start: 2021-08-10 | End: 2021-08-10 | Stop reason: HOSPADM

## 2021-08-10 RX ORDER — AMOXICILLIN 250 MG
1 CAPSULE ORAL 2 TIMES DAILY
Status: DISCONTINUED | OUTPATIENT
Start: 2021-08-10 | End: 2021-08-10 | Stop reason: HOSPADM

## 2021-08-10 RX ORDER — MISOPROSTOL 200 UG/1
400 TABLET ORAL
Status: DISCONTINUED | OUTPATIENT
Start: 2021-08-10 | End: 2021-08-10 | Stop reason: HOSPADM

## 2021-08-10 RX ORDER — ONDANSETRON 4 MG/1
4 TABLET, ORALLY DISINTEGRATING ORAL EVERY 30 MIN PRN
Status: DISCONTINUED | OUTPATIENT
Start: 2021-08-10 | End: 2021-08-10 | Stop reason: HOSPADM

## 2021-08-10 RX ORDER — HYDROCORTISONE 2.5 %
CREAM (GRAM) TOPICAL 3 TIMES DAILY PRN
Status: DISCONTINUED | OUTPATIENT
Start: 2021-08-10 | End: 2021-08-12 | Stop reason: HOSPADM

## 2021-08-10 RX ORDER — NALOXONE HYDROCHLORIDE 0.4 MG/ML
0.2 INJECTION, SOLUTION INTRAMUSCULAR; INTRAVENOUS; SUBCUTANEOUS
Status: DISCONTINUED | OUTPATIENT
Start: 2021-08-10 | End: 2021-08-12 | Stop reason: HOSPADM

## 2021-08-10 RX ORDER — CARBOPROST TROMETHAMINE 250 UG/ML
250 INJECTION, SOLUTION INTRAMUSCULAR
Status: DISCONTINUED | OUTPATIENT
Start: 2021-08-10 | End: 2021-08-12 | Stop reason: HOSPADM

## 2021-08-10 RX ORDER — AMOXICILLIN 250 MG
2 CAPSULE ORAL 2 TIMES DAILY
Status: DISCONTINUED | OUTPATIENT
Start: 2021-08-10 | End: 2021-08-10 | Stop reason: HOSPADM

## 2021-08-10 RX ORDER — CARBOPROST TROMETHAMINE 250 UG/ML
250 INJECTION, SOLUTION INTRAMUSCULAR
Status: DISCONTINUED | OUTPATIENT
Start: 2021-08-10 | End: 2021-08-10 | Stop reason: HOSPADM

## 2021-08-10 RX ORDER — ACETAMINOPHEN 325 MG/1
975 TABLET ORAL ONCE
Status: DISCONTINUED | OUTPATIENT
Start: 2021-08-10 | End: 2021-08-10 | Stop reason: HOSPADM

## 2021-08-10 RX ORDER — ACETAMINOPHEN 325 MG/1
650 TABLET ORAL EVERY 4 HOURS PRN
Status: DISCONTINUED | OUTPATIENT
Start: 2021-08-13 | End: 2021-08-12 | Stop reason: HOSPADM

## 2021-08-10 RX ORDER — SODIUM CHLORIDE, SODIUM LACTATE, POTASSIUM CHLORIDE, CALCIUM CHLORIDE 600; 310; 30; 20 MG/100ML; MG/100ML; MG/100ML; MG/100ML
INJECTION, SOLUTION INTRAVENOUS CONTINUOUS
Status: DISCONTINUED | OUTPATIENT
Start: 2021-08-10 | End: 2021-08-10 | Stop reason: HOSPADM

## 2021-08-10 RX ORDER — MODIFIED LANOLIN
OINTMENT (GRAM) TOPICAL
Status: DISCONTINUED | OUTPATIENT
Start: 2021-08-10 | End: 2021-08-12 | Stop reason: HOSPADM

## 2021-08-10 RX ORDER — ONDANSETRON 2 MG/ML
4 INJECTION INTRAMUSCULAR; INTRAVENOUS EVERY 30 MIN PRN
Status: DISCONTINUED | OUTPATIENT
Start: 2021-08-10 | End: 2021-08-10 | Stop reason: HOSPADM

## 2021-08-10 RX ORDER — MODIFIED LANOLIN
OINTMENT (GRAM) TOPICAL
Status: DISCONTINUED | OUTPATIENT
Start: 2021-08-10 | End: 2021-08-10 | Stop reason: HOSPADM

## 2021-08-10 RX ORDER — TRANEXAMIC ACID 10 MG/ML
1 INJECTION, SOLUTION INTRAVENOUS EVERY 30 MIN PRN
Status: DISCONTINUED | OUTPATIENT
Start: 2021-08-10 | End: 2021-08-10 | Stop reason: HOSPADM

## 2021-08-10 RX ORDER — METOCLOPRAMIDE 10 MG/1
10 TABLET ORAL EVERY 6 HOURS PRN
Status: DISCONTINUED | OUTPATIENT
Start: 2021-08-10 | End: 2021-08-12 | Stop reason: HOSPADM

## 2021-08-10 RX ORDER — OXYTOCIN 10 [USP'U]/ML
10 INJECTION, SOLUTION INTRAMUSCULAR; INTRAVENOUS
Status: DISCONTINUED | OUTPATIENT
Start: 2021-08-10 | End: 2021-08-12 | Stop reason: HOSPADM

## 2021-08-10 RX ORDER — ONDANSETRON 4 MG/1
4 TABLET, ORALLY DISINTEGRATING ORAL EVERY 6 HOURS PRN
Status: DISCONTINUED | OUTPATIENT
Start: 2021-08-10 | End: 2021-08-12 | Stop reason: HOSPADM

## 2021-08-10 RX ORDER — NALOXONE HYDROCHLORIDE 0.4 MG/ML
0.2 INJECTION, SOLUTION INTRAMUSCULAR; INTRAVENOUS; SUBCUTANEOUS
Status: DISCONTINUED | OUTPATIENT
Start: 2021-08-10 | End: 2021-08-10 | Stop reason: HOSPADM

## 2021-08-10 RX ORDER — SIMETHICONE 80 MG
80 TABLET,CHEWABLE ORAL 4 TIMES DAILY PRN
Status: DISCONTINUED | OUTPATIENT
Start: 2021-08-10 | End: 2021-08-10 | Stop reason: HOSPADM

## 2021-08-10 RX ORDER — METHYLERGONOVINE MALEATE 0.2 MG/ML
200 INJECTION INTRAVENOUS
Status: DISCONTINUED | OUTPATIENT
Start: 2021-08-10 | End: 2021-08-12 | Stop reason: HOSPADM

## 2021-08-10 RX ORDER — NALOXONE HYDROCHLORIDE 0.4 MG/ML
0.4 INJECTION, SOLUTION INTRAMUSCULAR; INTRAVENOUS; SUBCUTANEOUS
Status: DISCONTINUED | OUTPATIENT
Start: 2021-08-10 | End: 2021-08-12 | Stop reason: HOSPADM

## 2021-08-10 RX ORDER — MISOPROSTOL 200 UG/1
800 TABLET ORAL
Status: DISCONTINUED | OUTPATIENT
Start: 2021-08-10 | End: 2021-08-12 | Stop reason: HOSPADM

## 2021-08-10 RX ORDER — PROCHLORPERAZINE MALEATE 10 MG
10 TABLET ORAL EVERY 6 HOURS PRN
Status: DISCONTINUED | OUTPATIENT
Start: 2021-08-10 | End: 2021-08-12 | Stop reason: HOSPADM

## 2021-08-10 RX ORDER — BISACODYL 10 MG
10 SUPPOSITORY, RECTAL RECTAL DAILY PRN
Status: DISCONTINUED | OUTPATIENT
Start: 2021-08-12 | End: 2021-08-12 | Stop reason: HOSPADM

## 2021-08-10 RX ORDER — MISOPROSTOL 200 UG/1
400 TABLET ORAL
Status: DISCONTINUED | OUTPATIENT
Start: 2021-08-10 | End: 2021-08-12 | Stop reason: HOSPADM

## 2021-08-10 RX ORDER — METOCLOPRAMIDE HYDROCHLORIDE 5 MG/ML
10 INJECTION INTRAMUSCULAR; INTRAVENOUS EVERY 6 HOURS PRN
Status: DISCONTINUED | OUTPATIENT
Start: 2021-08-10 | End: 2021-08-12 | Stop reason: HOSPADM

## 2021-08-10 RX ORDER — KETOROLAC TROMETHAMINE 30 MG/ML
30 INJECTION, SOLUTION INTRAMUSCULAR; INTRAVENOUS EVERY 6 HOURS
Status: DISCONTINUED | OUTPATIENT
Start: 2021-08-10 | End: 2021-08-10 | Stop reason: HOSPADM

## 2021-08-10 RX ORDER — AMOXICILLIN 250 MG
1 CAPSULE ORAL DAILY
Qty: 100 TABLET | Refills: 0 | Status: ON HOLD | OUTPATIENT
Start: 2021-08-10 | End: 2021-08-12

## 2021-08-10 RX ORDER — OXYTOCIN/0.9 % SODIUM CHLORIDE 30/500 ML
340 PLASTIC BAG, INJECTION (ML) INTRAVENOUS CONTINUOUS PRN
Status: DISCONTINUED | OUTPATIENT
Start: 2021-08-10 | End: 2021-08-10 | Stop reason: HOSPADM

## 2021-08-10 RX ORDER — ACETAMINOPHEN 325 MG/1
975 TABLET ORAL EVERY 6 HOURS
Status: DISCONTINUED | OUTPATIENT
Start: 2021-08-10 | End: 2021-08-12 | Stop reason: HOSPADM

## 2021-08-10 RX ORDER — DEXTROSE, SODIUM CHLORIDE, SODIUM LACTATE, POTASSIUM CHLORIDE, AND CALCIUM CHLORIDE 5; .6; .31; .03; .02 G/100ML; G/100ML; G/100ML; G/100ML; G/100ML
INJECTION, SOLUTION INTRAVENOUS CONTINUOUS
Status: DISCONTINUED | OUTPATIENT
Start: 2021-08-10 | End: 2021-08-12 | Stop reason: HOSPADM

## 2021-08-10 RX ORDER — METOCLOPRAMIDE HYDROCHLORIDE 5 MG/ML
10 INJECTION INTRAMUSCULAR; INTRAVENOUS EVERY 6 HOURS PRN
Status: DISCONTINUED | OUTPATIENT
Start: 2021-08-10 | End: 2021-08-10 | Stop reason: HOSPADM

## 2021-08-10 RX ORDER — HYDROMORPHONE HYDROCHLORIDE 1 MG/ML
.3-.5 INJECTION, SOLUTION INTRAMUSCULAR; INTRAVENOUS; SUBCUTANEOUS EVERY 30 MIN PRN
Status: DISCONTINUED | OUTPATIENT
Start: 2021-08-10 | End: 2021-08-12 | Stop reason: HOSPADM

## 2021-08-10 RX ORDER — DIPHENHYDRAMINE HCL 25 MG
25 CAPSULE ORAL EVERY 6 HOURS PRN
Status: DISCONTINUED | OUTPATIENT
Start: 2021-08-10 | End: 2021-08-12 | Stop reason: HOSPADM

## 2021-08-10 RX ORDER — OXYTOCIN/0.9 % SODIUM CHLORIDE 30/500 ML
340 PLASTIC BAG, INJECTION (ML) INTRAVENOUS CONTINUOUS PRN
Status: DISCONTINUED | OUTPATIENT
Start: 2021-08-10 | End: 2021-08-12 | Stop reason: HOSPADM

## 2021-08-10 RX ORDER — BISACODYL 10 MG
10 SUPPOSITORY, RECTAL RECTAL DAILY PRN
Status: DISCONTINUED | OUTPATIENT
Start: 2021-08-11 | End: 2021-08-10 | Stop reason: HOSPADM

## 2021-08-10 RX ORDER — OXYCODONE HYDROCHLORIDE 5 MG/1
5 TABLET ORAL EVERY 4 HOURS PRN
Status: DISCONTINUED | OUTPATIENT
Start: 2021-08-10 | End: 2021-08-10

## 2021-08-10 RX ORDER — PROCHLORPERAZINE 25 MG
25 SUPPOSITORY, RECTAL RECTAL EVERY 12 HOURS PRN
Status: DISCONTINUED | OUTPATIENT
Start: 2021-08-10 | End: 2021-08-12 | Stop reason: HOSPADM

## 2021-08-10 RX ORDER — ONDANSETRON 4 MG/1
4 TABLET, ORALLY DISINTEGRATING ORAL EVERY 6 HOURS PRN
Status: DISCONTINUED | OUTPATIENT
Start: 2021-08-10 | End: 2021-08-10 | Stop reason: HOSPADM

## 2021-08-10 RX ORDER — MISOPROSTOL 200 UG/1
800 TABLET ORAL
Status: DISCONTINUED | OUTPATIENT
Start: 2021-08-10 | End: 2021-08-10 | Stop reason: HOSPADM

## 2021-08-10 RX ORDER — IBUPROFEN 800 MG/1
800 TABLET, FILM COATED ORAL EVERY 6 HOURS
Status: DISCONTINUED | OUTPATIENT
Start: 2021-08-10 | End: 2021-08-10 | Stop reason: HOSPADM

## 2021-08-10 RX ORDER — ONDANSETRON 2 MG/ML
4 INJECTION INTRAMUSCULAR; INTRAVENOUS EVERY 6 HOURS PRN
Status: DISCONTINUED | OUTPATIENT
Start: 2021-08-10 | End: 2021-08-12 | Stop reason: HOSPADM

## 2021-08-10 RX ORDER — OXYCODONE HYDROCHLORIDE 5 MG/1
5 TABLET ORAL EVERY 4 HOURS PRN
Status: DISCONTINUED | OUTPATIENT
Start: 2021-08-10 | End: 2021-08-12 | Stop reason: HOSPADM

## 2021-08-10 RX ORDER — IBUPROFEN 600 MG/1
600 TABLET, FILM COATED ORAL EVERY 6 HOURS PRN
Qty: 60 TABLET | Refills: 0 | Status: ON HOLD | OUTPATIENT
Start: 2021-08-10 | End: 2021-08-12

## 2021-08-10 RX ORDER — METOCLOPRAMIDE 10 MG/1
10 TABLET ORAL EVERY 6 HOURS PRN
Status: DISCONTINUED | OUTPATIENT
Start: 2021-08-10 | End: 2021-08-10 | Stop reason: HOSPADM

## 2021-08-10 RX ADMIN — KETOROLAC TROMETHAMINE 30 MG: 30 INJECTION, SOLUTION INTRAMUSCULAR; INTRAVENOUS at 11:04

## 2021-08-10 RX ADMIN — SODIUM CHLORIDE, SODIUM LACTATE, POTASSIUM CHLORIDE, CALCIUM CHLORIDE AND DEXTROSE MONOHYDRATE: 5; 600; 310; 30; 20 INJECTION, SOLUTION INTRAVENOUS at 06:25

## 2021-08-10 RX ADMIN — ACETAMINOPHEN 975 MG: 325 TABLET, FILM COATED ORAL at 21:05

## 2021-08-10 RX ADMIN — Medication 100 ML/HR: at 00:51

## 2021-08-10 RX ADMIN — KETOROLAC TROMETHAMINE 30 MG: 30 INJECTION, SOLUTION INTRAMUSCULAR; INTRAVENOUS at 17:06

## 2021-08-10 RX ADMIN — KETOROLAC TROMETHAMINE 30 MG: 30 INJECTION, SOLUTION INTRAMUSCULAR; INTRAVENOUS at 05:11

## 2021-08-10 RX ADMIN — KETOROLAC TROMETHAMINE 30 MG: 30 INJECTION, SOLUTION INTRAMUSCULAR; INTRAVENOUS at 23:01

## 2021-08-10 RX ADMIN — SENNOSIDES AND DOCUSATE SODIUM 1 TABLET: 8.6; 5 TABLET ORAL at 08:31

## 2021-08-10 RX ADMIN — ACETAMINOPHEN 975 MG: 325 TABLET, FILM COATED ORAL at 15:03

## 2021-08-10 RX ADMIN — SENNOSIDES AND DOCUSATE SODIUM 1 TABLET: 8.6; 5 TABLET ORAL at 20:38

## 2021-08-10 RX ADMIN — ACETAMINOPHEN 975 MG: 325 TABLET, FILM COATED ORAL at 02:31

## 2021-08-10 RX ADMIN — ACETAMINOPHEN 975 MG: 325 TABLET, FILM COATED ORAL at 08:31

## 2021-08-10 RX ADMIN — HYDROMORPHONE HYDROCHLORIDE 0.3 MG: 1 INJECTION, SOLUTION INTRAMUSCULAR; INTRAVENOUS; SUBCUTANEOUS at 02:31

## 2021-08-10 RX ADMIN — OXYCODONE HYDROCHLORIDE 5 MG: 5 TABLET ORAL at 20:37

## 2021-08-10 ASSESSMENT — ACTIVITIES OF DAILY LIVING (ADL)
FALL_HISTORY_WITHIN_LAST_SIX_MONTHS: NO
DIFFICULTY_EATING/SWALLOWING: OTHER (SEE COMMENTS)
WALKING_OR_CLIMBING_STAIRS_DIFFICULTY: OTHER (SEE COMMENTS)
DIFFICULTY_COMMUNICATING: OTHER (SEE COMMENTS)
CONCENTRATING,_REMEMBERING_OR_MAKING_DECISIONS_DIFFICULTY: NO
WEAR_GLASSES_OR_BLIND: NO
HEARING_DIFFICULTY_OR_DEAF: NO
TOILETING_ISSUES: NO
DOING_ERRANDS_INDEPENDENTLY_DIFFICULTY: NO
DRESSING/BATHING_DIFFICULTY: NO

## 2021-08-10 NOTE — PLAN OF CARE
EMS arrived to transport patient to [facility] due to infant transferring to [facility] due to [facility] being closed. Patient VSS, afebrile, fundus firm and U/1 with light bleeding, IV patent and infusing, incision clean dry and intact. Report given to Harriett MONTES at [facility]. Report and paperwork given to EMS and patient stable for transfer.

## 2021-08-10 NOTE — H&P
[facility]    History and Physical  Obstetrics and Gynecology     Date of Admission:  8/10/2021    Assessment & Plan   Philip Pfeiffer is a 30 year old  admitted as a transfer from [facility] to be close to  who requires NICU admission     ASSESSMENT:   Delivery at 41+1wga  POD#1 from Emergent  section  Doing well pain will controlled  Awaiting void post-catheter      PLAN:   Continue postpartum cares  Anticipate discharge to home on POD#3-4    Keli Coyne MD    History of Present Illness   Philip Pfeiffer is a 30 year old female  41w0d  Estimated Date of Delivery: Data Unavailable is calculated from Patient's last menstrual period was 10/26/2020. is admitted to [facility] from [facility] due to the  needing NICU admission. Prenatal care was with the CNM service at [facility]. She underwent an induction of labor at 41 weeks due to late term gestation. She had brisk vaginal bleeding during a provider exam and an emergency  section was called. Her  required respiratory support in the NICU there [facility] requested maternal and  transfer. She is doing well this AM. Pain is well controlled. [place] was removed on admission at 2 am and she is yet to void.  is in the NICU on CPAP.     PRENATAL COURSE  Prenatal course was essentially uncomplicated      Recent Labs   Lab Test 21  0000   ABO  --  A   RH  --  Pos   AS Negative Negative     Rhogam not indicated   Recent Labs   Lab Test 21  0000   HEPBANG  --  Negative (Non Reactive)   HIAGAB  --  Negative (Non Reactive)   GBS Negative  --    RUQIGG  --  4.27         Prior to Admission Medications   Prior to Admission Medications   Prescriptions Last Dose Informant Patient Reported? Taking?   Doxylamine Succinate, Sleep, (UNISOM PO)   Yes No   Sig: Take 50 mg by mouth   Prenatal Vit-Fe Fumarate-FA (PRENATAL VITAMIN PO)   Yes No   acetaminophen  (TYLENOL) 325 MG tablet   No No   Sig: Take 2 tablets (650 mg) by mouth every 6 hours as needed for mild pain Start after Delivery.   ibuprofen (ADVIL/MOTRIN) 600 MG tablet   No No   Sig: Take 1 tablet (600 mg) by mouth every 6 hours as needed for moderate pain Start after delivery   senna-docusate (SENOKOT-S/PERICOLACE) 8.6-50 MG tablet   No No   Sig: Take 1 tablet by mouth daily Start after delivery.      Facility-Administered Medications: None     Allergies   Allergies   Allergen Reactions     Vancomycin Hives and Itching         Immunization History   Immunization History   Administered Date(s) Administered     DTAP (<7y) 1995     Flu, Unspecified 2012, 10/15/2013, 2013, 11/15/2014     T9u0-19 Novel Flu 2010     HPV Quadrivalent 2007, 11/15/2007, 2008, 2008     HepA-Adult 2008, 08/15/2011, 2012     Influenza (IIV3) PF 2012     Influenza Vaccine IM > 6 months Valent IIV4 10/13/2016, 02/15/2019     Meningococcal (Menactra ) 08/15/2011     Polio, Unspecified  1995     TDAP Vaccine (Adacel) 08/15/2011     Td (Adult), Adsorbed 2003     Tdap (Adacel,Boostrix) 2021       No past medical history on file.    Past Surgical History:   Procedure Laterality Date      SECTION N/A 2021    Procedure:  SECTION;  Surgeon: Jamila Cherry MD;  Location:  L+D     ORTHOPEDIC SURGERY Right     Right 2nd and 3rd metatarsal ORIF age 14, with subsequent osteomyelitis     TONSILLECTOMY & ADENOIDECTOMY       wisdom teeth         Vitals:    08/10/21 0400 08/10/21 0500 08/10/21 0600 08/10/21 0759   BP: 105/66 105/58 112/77 105/61   Pulse: 81 72 87 75   Resp: 16 16 16 18   Temp: 98.1  F (36.7  C) 98  F (36.7  C) 97.9  F (36.6  C) 98.1  F (36.7  C)   TempSrc: Oral Oral Oral Oral   SpO2: 100% 100% 100%      GEN: NAD  Abdomen: soft, non-distended. Incision is covered with steri strips.  Ext: non-tender    Keli Coyne MD

## 2021-08-10 NOTE — PROGRESS NOTES
"   Called to bedside at  to check progress as pitocin hadn't been started r/t difficulty monitoring FHTs while patient was laboring on ball. Labor is increasing in intensity. The RN was in the process of preparing to convert to a jodi monitor. She returned to the bed and a SVE was done.  After cervical check, with the next contraction she started to have brisk vaginal bleeding that pooled under her. The regular monitor was adjusted and the we watched the FHTs decel from baseline to the 60s. A FSE was placed and despite repositioning, starting a fluid bolus and resuscitative measures the FHTs remained in the 60s. After 3 minutes a code c/section was called and Philip was moved emergently to the OR.     O:  Blood pressure 119/75, temperature 98.2  F (36.8  C), temperature source Oral, resp. rate 18, height 1.676 m (5' 5.98\"), weight 89.3 kg (196 lb 14.4 oz), last menstrual period 10/26/2020.  General appearance: uncomfortable with contractions    CONTRACTIONS: Contractions every 3 minutes.  Palpate: moderate  FETAL HEART TONES: baseline 140 with moderate FHR variability and    accelerations yes or no. Decelerations yes.  - prolonged.   ROM: clear fluid  PELVIC EXAM:PELVIC EXAM: 5/ 90%/ Mid/ soft/ -1   Fetal Position:  Cephalic  Bloody show: Yes   Pitocin- none,  Antibiotics- none  Cervical ripening: N/A  ASSESSMENT:  ==============  IUP @ 41w0d active labor   Fetal Heart rate tracing Category category three  GBS- negative     PLAN:  ===========  Code  called  Prepare for  section     Nissa Beltre, CNM, APRN CNM, CNM    "

## 2021-08-10 NOTE — PLAN OF CARE
Patient's vital signs are stable.  She is tolerating diet, ambulating and caring for the baby independently. Patient wasn't able to void and was bladder scanned for 520.  She was straight cathed for 1100.  Fundus firm and U/1.  She voided 2 1/2 hours after the straight cath. Adequate pain control with oral pain medications. She is no longer a fall risk.  She and spouse are going down to see daughter in NICU.   Incision is clean, dry and intact.  Lochia is WNL.

## 2021-08-10 NOTE — OP NOTE
Section Operative Note    Philip Pfeiffer    1990   MRN [id number]    Date of Surgery: 21      Surgeon: Jamila Cherry MD  Assistants: Freda Marin MD PGY-2     Pre-operative Diagnoses:   -  at 41w0d  - Failed GCT - Passed GTT   - Category II FHT Remote from Delivery      Post-operative Diagnoses:   - Same, now  delivered via below stated procedure     Procedure: Primary low transverse  section with double layer uterine closure via Pfannenstiel skin incision     Anesthesia: Spinal anesthesia      EBL: 322cc  IVF: 1000cc  UO: 150cc pink-tinged urine (documented as similar color prior to case)      Indication: Philip Pfeiffer is a 30 year old then  female at 41w0d of the CN service who was undergoing IOL for term gestation. Pregnancy uncomplicated. She underwent SVE with her primary provider when, upon her next contraction, she was noted to have brisk vaginal bleeding and subsequent prolonged deceleration on FHT (jett to 60bpm) despite resuscitation. Given these findings, a  section was called emergently and the patient was moved to the OR. Verbal consent performed by attending staff Dr. Cherry.      Findings: A single vigorous, liveborn female infant born from the LOP position with Apgars of 8 and 8. Normal appearing uterus, fallopian tubes, ovaries. Loose truncal nuchal cord that was reduced after delivery of the infant. Minimal amount of clear amniontic fluid. No abdominal wall adhesions or intraabdominal adhesions.      Specimens: Cord blood, cord segment.   Complications: None apparent    Procedure Details:  The patient was initially brought to the operating room in an emergent fashion due to the above-noted indication on FHT. Once in the OR, baseline -135bpm with persistent decelerations with every contraction. The case was then converted to urgent in status, at which time the patient underwent spinal anesthesia. She was then prepped and draped in the usual  sterile fashion in the dorsal supine position with a leftward tilt. A time out was performed. A Pfannenstiel skin incision was made with the scalpel and carried through the underlying layer to the fascia. The fascia was incised in the midline and extended laterally with blunt dissection. The superior aspect of the fascial incision was grasped and elevated manually to expose the underlying rectus muscles, which were dissected off bluntly. Attention was then turned to the inferior aspect of the incision, which, in a similar fashion was grasped and the rectus muscle dissected off bluntly. The rectus muscles were then  in the midline. The peritoneum was then identified and entered as well as extended bluntly. The bladder blade was then inserted. The lower uterine segment was incised in a transverse fashion with the scalpel. The incision was extended digitally. The bladder blade was removed. The infant's head was delivered, the body rotated, and the rest of the infant was delivered atraumatically. The cord was clamped and cut and then the baby was handed off to the NICU staff. A segment of cord was taken for cord gases. The placenta was then removed with gentle traction on the cord and fundal message. The uterus remained in situ while cleared of all clots and debris. The uterine incision was repaired with 0-Vicryl in a running locked fashion. A second layer of 0-vicryl was used to imbricate the incision. The paracolic gutters were then cleared of clots. At this time, the incision was again visualized - a 1.5cm hematoma was noted within the right broad ligament, with active bleeding that slowed with pressure. Given ongoing bleeding from the site, however, the uterus was then exteriorized and an O'Larue was placed to the area using 0-Vicryl. Hemostasis of the hysterotomy was then achieved. The posterior cul-de-sac was then cleared of clots and debris before returning the uterus to the abdomen. A final evaluation  of the hysterotomy was performed and noted to be hemostatic. Manual traction was then used to elevated the fascia superiorly and inferiorly and good hemostasis was noted. The fascia was closed with 0-vicryl in a running fashion. The subcutaneous tissue was irrigated and areas of bleeding were controlled with cautery. The subcutaneous tissue was closed with 3-0 vicryl. The skin was closed with 4-0 vicryl in a subcuticular fashion. The patient tolerated the procedure well and was taken to the recovery room in stable condition. All lap, instrument, and sharps counts were correct times two. Dr. Cherry was present for paniagua portions of the procedure.     Freda Marin MD  Ob/Gyn Resident, PGY-2  08/09/21 10:58 PM     Physician Attestation   I was present for the entire procedure between opening and closing of the fascia. I was called away to an emergency on labor and delivery for skin closure.   I evaluated her subsequently in the PACU, urine is clear.    Jamila Cherry  Date of Service (when I saw the patient): 08/09/21

## 2021-08-10 NOTE — PROGRESS NOTES
[facility] PROGRESS NOTE:   2021   11:54 PM - late entry         SUBJECTIVE:   I was called emergently to operating room for stat .   Nissa LUCIANOM had performed SVE then noted holli vaginal bleeding. A 3-4 minute fetal deceleration then followed and stat  was called.   Upon my arrival in the OR, FHTs were in the 80s, slowly rising to low 100s.     We completed a verbal  section consent.   Upon completion of this conversation, FHT had risen to 150. Decelerations to 130 with late return to 150 continued, and decision was made to proceed with urgent  under spinal.            OBJECTIVE:         NST:  Fetal Heart Rate Tracing:   Baseline: as above  Tocometer: q 3-5 minutes    Abdomen:  Tonic contractions  Cervix:   Dilation: 5 cm per CNM         LABS:     Recent Results (from the past 12 hour(s))   SARS-COV2 (COVID-19) Virus RT-PCR    Collection Time: 21  4:34 PM    Specimen: Nasopharyngeal; Swab   Result Value Ref Range    SARS CoV2 PCR Negative Negative   Rupture of Fetal Membranes by ROM Plus    Collection Time: 21  4:36 PM   Result Value Ref Range    Rupture of Fetal Membranes by ROM Plus Negative Negative, Invalid, Suggest Repeat   CBC with platelets and differential    Collection Time: 21  6:45 PM   Result Value Ref Range    WBC Count 17.0 (H) 4.0 - 11.0 10e3/uL    RBC Count 4.30 3.80 - 5.20 10e6/uL    Hemoglobin 12.5 11.7 - 15.7 g/dL    Hematocrit 37.8 35.0 - 47.0 %    MCV 88 78 - 100 fL    MCH 29.1 26.5 - 33.0 pg    MCHC 33.1 31.5 - 36.5 g/dL    RDW 13.4 10.0 - 15.0 %    Platelet Count 249 150 - 450 10e3/uL    % Neutrophils 80 %    % Lymphocytes 13 %    % Monocytes 6 %    % Eosinophils 0 %    % Basophils 0 %    % Immature Granulocytes 1 %    NRBCs per 100 WBC 0 <1 /100    Absolute Neutrophils 13.6 (H) 1.6 - 8.3 10e3/uL    Absolute Lymphocytes 2.1 0.8 - 5.3 10e3/uL    Absolute Monocytes 1.0 0.0 - 1.3 10e3/uL    Absolute  Eosinophils 0.1 0.0 - 0.7 10e3/uL    Absolute Basophils 0.1 0.0 - 0.2 10e3/uL    Absolute Immature Granulocytes 0.2 (H) <=0.0 10e3/uL    Absolute NRBCs 0.0 10e3/uL   Adult Type and Screen    Collection Time: 21  8:21 PM   Result Value Ref Range    ABO/RH(D) A POS     Antibody Screen Negative Negative    SPECIMEN EXPIRATION DATE [id number]               ASSESSMENT:   30 year old  at 41w0d   Category 2 tracing.  Concern for abruption           PLAN:   Verbal consent held for  for bradycardia.   FHT returned to baseline with continued decelerations  Prepare for  section under spinal    Jamila Cherry MD

## 2021-08-10 NOTE — DISCHARGE SUMMARY
[facility] Discharge Summary    Philip Pfeiffer MRN# [id number]   Age: 30 year old YOB: 1990     Date of Admission:  2021  Date of Discharge::  08/10/2021  Admitting Physician:  AGUSTIN Coelho CN  Discharge Physician:  Jamila Cherry MD             Admission Diagnoses:   -   - IUP at 41w0d  - Failed GCT - Passed GTT   - Category II FHT Remote from Delivery           Discharge Diagnosis:   - Postpartum, s/p delivery of viable infant         Procedures:     Procedure(s): - Primary low transverse  section with double layer uterine closure via Pfannenstiel skin incision  - Spinal anesthesia  - TAP block   - Electronic fetal monitoring               Medications Prior to Admission:     Medications Prior to Admission   Medication Sig Dispense Refill Last Dose     Prenatal Vit-Fe Fumarate-FA (PRENATAL VITAMIN PO)    2021 at      Doxylamine Succinate, Sleep, (UNISOM PO) Take 50 mg by mouth   More than a month at Unknown time             Discharge Medications:        Review of your medicines      START taking      Dose / Directions   acetaminophen 325 MG tablet  Commonly known as: TYLENOL  Used for: Single liveborn, born in hospital, delivered by  section      Dose: 650 mg  Take 2 tablets (650 mg) by mouth every 6 hours as needed for mild pain Start after Delivery.  Quantity: 100 tablet  Refills: 0     ibuprofen 600 MG tablet  Commonly known as: ADVIL/MOTRIN  Used for: Single liveborn, born in hospital, delivered by  section      Dose: 600 mg  Take 1 tablet (600 mg) by mouth every 6 hours as needed for moderate pain Start after delivery  Quantity: 60 tablet  Refills: 0     senna-docusate 8.6-50 MG tablet  Commonly known as: SENOKOT-S/PERICOLACE  Used for: Single liveborn, born in hospital, delivered by  section      Dose: 1 tablet  Take 1 tablet by mouth daily Start after delivery.  Quantity: 100 tablet  Refills: 0        CONTINUE these  medicines which have NOT CHANGED      Dose / Directions   PRENATAL VITAMIN PO      Refills: 0     UNISOM PO      Dose: 50 mg  Take 50 mg by mouth  Refills: 0           Where to get your medicines      These medications were sent to [id number] - [place], [place] - [address] AT [facility] & [address], [place] [address]    Phone: [phone]     acetaminophen 325 MG tablet    ibuprofen 600 MG tablet    senna-docusate 8.6-50 MG tablet               Consultations:   Anesthesiology  NICU  Social Work          Brief History of Admission   Philip Pfeiffer is a 30 year old then  female at 41w0d of the [facility] service who was undergoing IOL for term gestation. Pregnancy uncomplicated. She underwent SVE with her primary provider when, upon her next contraction, she was noted to have brisk vaginal bleeding and subsequent prolonged deceleration on FHT (jett to 60bpm) despite resuscitation. Given these findings, a  section was called emergently and the patient was moved to the OR. Verbal consent performed by attending staff Dr. Cherry.     The patient was initially brought to the operating room in an emergent fashion due to the above-noted indication on FHT. Once in the OR, baseline -135bpm with persistent decelerations with every contraction. The case was then converted to urgent in status          Intrapartum/Procedural Course   The procedure was uncomplicated. Please see Operative Report for details. Findings as follows:     Findings: A single vigorous, liveborn female infant born from the LOP position with Apgars of 8 and 8. Normal appearing uterus, fallopian tubes, ovaries. Loose truncal nuchal cord that was reduced after delivery of the infant. Minimal amount of clear amniontic fluid. No abdominal wall adhesions or intraabdominal adhesions.           Postpartum Hospital Course:   After delivery, the patient's infant required additional  assistance via the NICU staff for respiratory support. NICU at this facility remained at capacity at the time of delivery, requiring the infant be transferred to [facility] for additional care. Thus, the patient opted to transfer care as well for proximity purposes. She was transferred on POD#0 shortly after her procedure via EMS in stable clinical condition. Vaginal bleeding was minimal. Wilson remained in.     Contraception: Not discussed.   Rhogam was not indicated          Discharge Instructions and Follow-Up:     Discharge diet: Regular   Discharge activity: Activity as tolerated   Discharge follow-up: Follow up with your primary OBGYN provider in six weeks for a routine postpartum visit and within one week for an incision check.     Wound care: Drink plenty of fluids  Ice to area for comfort  Keep wound clean and dry            Discharge Disposition:     Patient transferred to [facility] via EMS.       Attestation:  I have reviewed today's vital signs, notes, medications, labs and imaging.    Freda Marin MD  OB/GYN PGY-2  08/10/21 12:08 AM         Physician Attestation   I, Jamila Cherry, saw and evaluated this patient prior to discharge.  I discussed the patient with the resident/fellow and agree with plan of care as documented in the note.      I personally reviewed vital signs, medications and labs.    I personally spent 15 minutes on discharge activities.    Transferred to [facility] as postpartum transfer due to infant transfer to their NICU.     Jamila Cherry MD  Date of Service (when I saw the patient): 08/10/21

## 2021-08-10 NOTE — ANESTHESIA PROCEDURE NOTES
TAP Procedure Note  Pre-Procedure   Staff -        Anesthesiologist:  Munira Silva MD       Performed By: anesthesiologist       Location: pre-op       Procedure Start/Stop Times: 8/9/2021 10:25 PM and 8/9/2021 10:30 PM       Pre-Anesthestic Checklist: patient identified, IV checked, site marked, risks and benefits discussed, informed consent, monitors and equipment checked, pre-op evaluation, at physician/surgeon's request and post-op pain management  Timeout:       Correct Patient: Yes        Correct Procedure: Yes        Correct Site: Yes        Correct Position: Yes        Correct Laterality: Yes        Site Marked: Yes  Procedure Documentation  Procedure: TAP       Diagnosis: POST OPERATIVE PAIN       Laterality: bilateral       Patient Position: supine       Patient Prep/Sterile Barriers: sterile gloves, mask       Skin prep: Chloraprep       Needle Type: short bevel       Needle Gauge: 21.        Needle Length (millimeters): 110        Ultrasound guided       1. Ultrasound was used to identify targeted nerve, plexus, vascular marker, or fascial plane and place a needle adjacent to it in real-time.       2. Ultrasound was used to visualize the spread of anesthetic in close proximity to the above referenced structure.       3. A permanent image is entered into the patient's record.    Assessment/Narrative         The placement was negative for: blood aspirated, painful injection and site bleeding       Paresthesias: No.     Bolus given via needle..        Secured via.        Insertion/Infusion Method: Single Shot       Complications: none       Injection made incrementally with aspirations every 5 mL.    Medication(s) Administered   Bupivacaine 0.25% PF (Infiltration), 20 mL  Bupivacaine liposome (Exparel) 1.3% LA inj susp (Infiltration), 20 mL  Medication Administration Time: 8/9/2021 10:30 PM    Comments:  Discussed risks of nerve block, including nerve injury, bleeding, infection, incomplete analgesia.  Discussed anticipated areas of sensory and motor block. Discussed alternative of not performing a nerve block. Ensured understanding, invited questions and all questions were answered. Patient wishes to proceed.  Informed consent was obtained.     Patient tolerated well. Incremental aspiration every 5 mL. No paresthesia, no heme. Needle tip visualized throughout with appropriate spread of local anesthetic in fascial plane.  Block was placed at the surgeon's request for post operative pain control.

## 2021-08-10 NOTE — PROGRESS NOTES
"    S:After IOL was recommended r/t non-reactive NST at 41 wks a plan was made for IOL with pitocin. Since that time her contractions have increased in strength. She now states that she needs to focus and cannot talk during them. An IV was placed and labs were drawn and after the IV was not longer patent. IV is now being replaced. Pitocin has not yet been started.     O:  Blood pressure 119/75, temperature 98.2  F (36.8  C), temperature source Oral, resp. rate 18, height 1.676 m (5' 5.98\"), weight 89.3 kg (196 lb 14.4 oz), last menstrual period 10/26/2020.  General appearance: uncomfortable w/ contractions     CONTRACTIONS: Contractions every 1-5 minutes.  Palpate: mild  FETAL HEART TONES: baseline 145 with moderate FHR variability and    accelerations yes but rare. Decelerations yes, the rare small variable. .    NST: NON-REACTIVE  CST: NOT DONE   ROM: not ruptured  PELVIC EXAM:deferred  Fetal Position:  Cephalic confirmed by ultrasound today  Bloody show: Yes  scant  Pitocin- none,  Antibiotics- none  Cervical ripening: low dose pitocin  ASSESSMENT:  ==============  IUP @ 41w0d early labor   Fetal Heart rate tracing Category category one  GBS- negative     PLAN:  ===========  comfort measures prn   Pain medication per patient request. Discussion with Philip and she plans to use comfort measures first. If she feels like she needs medications she is not opposed to them but plans to see if she can cope with comfort measures alone first.   Anticipate   Labor induction with Pitocin  reevaluate in 2-4 hours/PRN     Nissa Beltre, ANKITAM, APRN ANKITAM, CNM    "

## 2021-08-10 NOTE — ANESTHESIA CARE TRANSFER NOTE
Patient: Philip Pfeiffer    Procedure(s):   SECTION    Diagnosis: * No pre-op diagnosis entered *  Diagnosis Additional Information: No value filed.    Anesthesia Type:   Spinal     Note:    Oropharynx: oropharynx clear of all foreign objects  Level of Consciousness: awake  Oxygen Supplementation: room air    Independent Airway: airway patency satisfactory and stable  Dentition: dentition unchanged  Vital Signs Stable: post-procedure vital signs reviewed and stable  Report to RN Given: handoff report given  Patient transferred to: Labor and Delivery    Handoff Report: Identifed the Patient, Identified the Reponsible Provider, Reviewed the pertinent medical history, Discussed the surgical course, Reviewed Intra-OP anesthesia mangement and issues during anesthesia, Set expectations for post-procedure period and Allowed opportunity for questions and acknowledgement of understanding      Vitals:  Vitals Value Taken Time   BP 99/56 21 2239   Temp     Pulse 96 21 2242   Resp     SpO2 97 % 21 2242   Vitals shown include unvalidated device data.    Electronically Signed By: AGUSTIN Ridley CRNA  2021  10:43 PM

## 2021-08-10 NOTE — BRIEF OP NOTE
Section Brief Op Note    Philip Pfeiffer  [id number]    Date of Surgery: 21     Surgeon: Jamila Cherry MD    Assistants: Freda Marin MD PGY-2    Pre-operative Diagnoses:   -  at 41w0d  - Category II FHT Remote from Delivery     Post-operative Diagnoses:   - Same, now  delivered via below stated procedure    Procedure: Primary low transverse  section with double layer uterine closure via Pfannenstiel skin incision    Anesthesia: Spinal anesthesia     EBL: 322cc  IVF: 1000cc  UO: 150cc pink-tinged urine (documented as similar color prior to case)     Indication: Philip Pfeiffer is a 30 year old then  female of the CNM service who was undergoing IOL for term gestation. She underwent SVE with her primary provider when, upon her next contraction, she was noted to have brisk vaginal bleeding and subsequent prolonged deceleration on FHT (jett to 60bpm) despite resuscitation. Given these findings, a  section was called emergently and the patient was moved to the OR. Verbal consent performed by attending staff Dr. Cherry.     Complications: Cord blood, cord segment.     Findings: A single vigorous, liveborn female infant born from the LOP position with Apgars of 8 and 8. Normal appearing uterus, fallopian tubes, ovaries. Loose truncal nuchal cord that was reduced after delivery of the infant. Minimal amount of clear amniontic fluid. No abdominal wall adhesions or intraabdominal adhesions.     Specimens: None    Freda Marin MD   OB/GYN PGY-2  21 10:20PM

## 2021-08-10 NOTE — ANESTHESIA PREPROCEDURE EVALUATION
Anesthesia Pre-Procedure Evaluation    Patient: Philip Pfeiffer   MRN: [id number] : 1990        Preoperative Diagnosis: * No pre-op diagnosis entered *   Procedure : Procedure(s):   SECTION     History reviewed. No pertinent past medical history.   Past Surgical History:   Procedure Laterality Date     ORTHOPEDIC SURGERY Right     Right 2nd and 3rd metatarsal ORIF age 14, with subsequent osteomyelitis     TONSILLECTOMY & ADENOIDECTOMY       wisdom teeth        Allergies   Allergen Reactions     Vancomycin Hives and Itching      Social History     Tobacco Use     Smoking status: Never Smoker     Smokeless tobacco: Never Used   Substance Use Topics     Alcohol use: Not Currently     Comment: about 4 glasses wine per week      Wt Readings from Last 1 Encounters:   21 89.3 kg (196 lb 14.4 oz)        Anesthesia Evaluation   Pt has had prior anesthetic.     No history of anesthetic complications       ROS/MED HX  ENT/Pulmonary:       Neurologic:     (+) no peripheral neuropathy     Cardiovascular:       METS/Exercise Tolerance:     Hematologic:     (+) no anticoagulation therapy, no coagulopathy, no thrombocytopenia,     Musculoskeletal:   (+) lower back pain lumbar spine     GI/Hepatic:       Renal/Genitourinary:       Endo:       Psychiatric/Substance Use:       Infectious Disease:       Malignancy:       Other:            Physical Exam    Airway        Mallampati: II   TM distance: > 3 FB   Neck ROM: full   Mouth opening: > 3 cm    Respiratory Devices and Support         Dental  no notable dental history         Cardiovascular   cardiovascular exam normal          Pulmonary   pulmonary exam normal                OUTSIDE LABS:  CBC:   Lab Results   Component Value Date    WBC 17.0 (H) 2021    HGB 12.5 2021    HGB 11.8 2021    HCT 37.8 2021     2021     2021     BMP: No results found for: NA, POTASSIUM, CHLORIDE, CO2, BUN, CR, GLC  COAGS: No results  found for: PTT, INR, FIBR  POC: No results found for: BGM, HCG, HCGS  HEPATIC: No results found for: ALBUMIN, PROTTOTAL, ALT, AST, GGT, ALKPHOS, BILITOTAL, BILIDIRECT, MADELIN  OTHER:   Lab Results   Component Value Date    A1C 5.8 (H) 11/06/2020       Anesthesia Plan    ASA Status:  2, emergent       Anesthesia Type: Spinal.              Consents    Anesthesia Plan(s) and associated risks, benefits, and realistic alternatives discussed. Questions answered and patient/representative(s) expressed understanding.     - Discussed with:  Patient         Postoperative Care            Comments:    Late note entry, as patient went to OR for stat C section. In OR, fetal heart tones recovered, and Dr. Cherry changed this to an urgent C section, and stated that there would be time for a spinal. At that time, I discussed risks of spinal anesthesia with the patient, including bleeding, infection, nerve injury, headache, conversion to general anesthesia. Discussed alternatives to spinal anesthesia. Discussed risks of backup general anesthesia, including sore throat/hoarse voice, abrasions/damage to lips/tongue/teeth, nausea, rare complications (including medication reactions, cardiac, pulmonary, recall). Ensured understanding, invited questions and all questions were answered. Patient wished to proceed under spinal anesthesia.  Postoperative TAP block: Discussed risks of nerve block, including nerve injury, bleeding, infection, incomplete analgesia. Discussed anticipated areas of sensory and motor block. Discussed alternative of not performing a nerve block. Ensured understanding, invited questions and all questions were answered. Patient wishes to proceed.                  Munira Silva MD

## 2021-08-10 NOTE — PROGRESS NOTES
Pt.transferred from [facility] approx 0145, arrive via EMS. Pt. arrived with baby and was accompanied by spouse and arrived with personal belongings. Report was taken from Jonelle at the [facility] and EMS.  Pt. is A&Ox3 and VSS on RA. Fundus is firm and U/1.  Vaginal bleeding is light.   Pt. c/o 3/10 pain and was given scheduled and PRN pain with decrease. Pt. denied nausea, CP, SOB, lightheadedness, or dizziness. LS clear and BS absent. PIV patent and infusing.  Wilson patent and draining.  Dressing to lower abdomen CDI.  Pneumoboots in place to BLE.  Pt. oriented to the room and call light system.

## 2021-08-10 NOTE — PROVIDER NOTIFICATION
At approx 2045 electronically paged BALJEET Beltre down to patient bedside due to difficulty monitoring fetal tracing while patient using the ball. Discussed starting pitocin but expressed concern due to difficult monitoring. CNM requested to check patient and patient 5/80/-1. While in bed and attempting to place Gay, provider noticed holli red bleeding from perineum and fetal tracing showed deceleration. Placed fetal scalp, repositioned to right and left side, fluid bolus initiated, and Dr. Olson called to room. Stat C Section called due to heart tones not returning to baseline.

## 2021-08-10 NOTE — LACTATION NOTE
Initial Lactation visit with Philip and TELLO. Baby is in NICU and Philip is pumping for baby every 3 hours. She is getting drops. Discussed continuing pumping every 3 hours. Discussed pumping in NICU with baby. Hands free pumping bra made. Recommend when baby is able unlimited, frequent breast feedings: At least 8 - 12 times every 24 hours. Instructed in hand expression. Explained benefits of holding baby skin on skin to help promote better breastfeeding outcomes. Will revisit as needed.    Deena Garcia RN, IBCLC

## 2021-08-10 NOTE — PLAN OF CARE
Early labor, patient states is comfortable with contractions, VSS, see flow sheet for FHR and contraction pattern.  Will start Pitocin if indicated, and will continue to monitor and will notify provider is there is a change in status. Anticipate .

## 2021-08-10 NOTE — ANESTHESIA PROCEDURE NOTES
Intrathecal injection Procedure Note  Pre-Procedure   Staff -        Anesthesiologist:  Munira Silva MD       Performed By: anesthesiologist       Location: OR       Procedure Start/Stop Times: 2021 9:17 PM and 2021 9:19 PM       Pre-Anesthestic Checklist: patient identified, IV checked, risks and benefits discussed, informed consent, monitors and equipment checked, pre-op evaluation, at physician/surgeon's request and post-op pain management  Timeout:       Correct Patient: Yes        Correct Procedure: Yes        Correct Site: Yes        Correct Position: Yes   Procedure Documentation  Procedure: intrathecal injection       Patient Position: sitting       Patient Prep/Sterile Barriers: sterile gloves, mask, patient draped       Skin prep: Chloraprep       Insertion Site: L3-4. (midline approach).       Needle Gauge: 25.        Needle Length (Inches): 3.5        Spinal Needle Type: Pencan       Introducer used       Introducer: 20 G       # of attempts: 1 and  # of redirects:  0    Assessment/Narrative         Paresthesias: No.       CSF fluid: clear.    Medication(s) Administered   0.75% Hyperbaric Bupivacaine (Intrathecal), 1.6 mL  Fentanyl PF (Intrathecal), 10 mcg  Morphine PF 1 mg/mL (Intrathecal), 0.1 mg  Medication Administration Time: 2021 9:19 PM    Comments:  Stat  with improvement in fetal heart tones. Converted to urgent , and Dr. Cherry (OB) would like to consider spinal. Discussed risks of spinal anesthesia including bleeding, infection, nerve injury, headache, conversion to general anesthesia. Discussed alternatives to spinal anesthesia. Patient understood and would like to proceed with spinal anesthesia. Ensured understanding, invited questions and all questions were answered.  Patient tolerated well. No paresthesia, no heme. Clear CSF. Appropriate block afterwards to T6 bilaterally.

## 2021-08-10 NOTE — PLAN OF CARE
VSS.  Incision C/D/I.  Up to bathroom with assist, tolerated well.  Pain well controlled with scheduled Tyl and Toradol, requesting prn diludid as needed. Working on pumping for baby in NICU. Wilson removed after arrival. Pit infusing on arrival. PIV patent and infusing. On pathway. Continue to monitor and notify MD as needed.

## 2021-08-11 PROCEDURE — 120N000012 HC R&B POSTPARTUM

## 2021-08-11 PROCEDURE — 250N000013 HC RX MED GY IP 250 OP 250 PS 637: Performed by: OBSTETRICS & GYNECOLOGY

## 2021-08-11 RX ORDER — FERROUS SULFATE 325(65) MG
325 TABLET ORAL DAILY
Status: DISCONTINUED | OUTPATIENT
Start: 2021-08-11 | End: 2021-08-12 | Stop reason: HOSPADM

## 2021-08-11 RX ADMIN — ACETAMINOPHEN 975 MG: 325 TABLET, FILM COATED ORAL at 10:00

## 2021-08-11 RX ADMIN — IBUPROFEN 800 MG: 400 TABLET ORAL at 11:04

## 2021-08-11 RX ADMIN — SENNOSIDES AND DOCUSATE SODIUM 2 TABLET: 8.6; 5 TABLET ORAL at 22:31

## 2021-08-11 RX ADMIN — OXYCODONE HYDROCHLORIDE 5 MG: 5 TABLET ORAL at 09:10

## 2021-08-11 RX ADMIN — ACETAMINOPHEN 975 MG: 325 TABLET, FILM COATED ORAL at 22:32

## 2021-08-11 RX ADMIN — OXYCODONE HYDROCHLORIDE 5 MG: 5 TABLET ORAL at 02:10

## 2021-08-11 RX ADMIN — SENNOSIDES AND DOCUSATE SODIUM 2 TABLET: 8.6; 5 TABLET ORAL at 09:20

## 2021-08-11 RX ADMIN — IBUPROFEN 800 MG: 400 TABLET ORAL at 04:49

## 2021-08-11 RX ADMIN — FERROUS SULFATE TAB 325 MG (65 MG ELEMENTAL FE) 325 MG: 325 (65 FE) TAB at 09:20

## 2021-08-11 RX ADMIN — OXYCODONE HYDROCHLORIDE 5 MG: 5 TABLET ORAL at 13:31

## 2021-08-11 RX ADMIN — ACETAMINOPHEN 975 MG: 325 TABLET, FILM COATED ORAL at 04:14

## 2021-08-11 RX ADMIN — OXYCODONE HYDROCHLORIDE 5 MG: 5 TABLET ORAL at 22:32

## 2021-08-11 RX ADMIN — IBUPROFEN 800 MG: 400 TABLET ORAL at 22:32

## 2021-08-11 RX ADMIN — OXYCODONE HYDROCHLORIDE 5 MG: 5 TABLET ORAL at 18:06

## 2021-08-11 RX ADMIN — ACETAMINOPHEN 975 MG: 325 TABLET, FILM COATED ORAL at 16:46

## 2021-08-11 RX ADMIN — IBUPROFEN 800 MG: 400 TABLET ORAL at 16:46

## 2021-08-11 NOTE — PLAN OF CARE
Vital signs stable. Voiding without difficulty. Lung sounds clear and equal. Using Oxycodone/Ibuprofen/Tylenol for pain management. Up ambulating free of dizziness. Working on breastfeeding every 2-3 hours. Visiting  in NICU. Encouraged to call with questions/concerns. Will continue to monitor.

## 2021-08-11 NOTE — PROGRESS NOTES
[facility]    Obstetrics Post-Op / Progress Note    Assessment & Plan   Assessment:  -* No surgery found *      Doing well.  No immediate surgical complications identified.  No excessive bleeding  Pain well-controlled.    Plan:  Ambulation encouraged  Lactation consultation  Monitor wound for signs of infection  Pain control measures as needed  Oral iron supplementation for mild anemia due to acute blood loss  Reportable signs and symptoms dicussed with the patient  Anticipate discharge in 1-2 days    Rita Mccall     Interval History   Doing well.  Pain is well-controlled.  No fevers.  No history of wound drainage, warmth or significant erythema.  Good appetite -no n/v.  +flatus. No BM yet.  Denies chest pain, shortness of breath, nausea or vomiting.  Ambulatory without lightheadedness/dizziness.  Pumping well.  Baby doing well in NICU --off CPAP as of yesterday afternoon.  Philip is doing well.  Pain at incision controlled with oral meds.     Medications     dextrose 5% lactated ringers Stopped (08/10/21 1433)     - MEDICATION INSTRUCTIONS -       - MEDICATION INSTRUCTIONS -       oxytocin in 0.9% NaCl Stopped (08/10/21 0200)       acetaminophen  975 mg Oral Q6H     senna-docusate  1 tablet Oral BID    Or     senna-docusate  2 tablet Oral BID     sodium chloride (PF)  3 mL Intracatheter Q8H       Physical Exam   Temp: 98.1  F (36.7  C) Temp src: Oral BP: 117/73 Pulse: 98   Resp: 16 SpO2: 100 % O2 Device: None (Room air)    There were no vitals filed for this visit.  Vital Signs with Ranges  Temp:  [97.9  F (36.6  C)-98.1  F (36.7  C)] 98.1  F (36.7  C)  Pulse:  [72-98] 98  Resp:  [16] 16  BP: ()/(61-73) 117/73  SpO2:  [100 %] 100 %  I/O last 3 completed shifts:  In: 903 [I.V.:903]  Out: 3125 [Urine:3125]    Uterine fundus is firm, non-tender and at the level of the umbilicus  Incision C/D/I  Extremities Non-tender    Data   Recent Labs   Lab Test 08/09/21 2021 01/20/21  0000    ABO  --  A   RH  --  Pos   AS Negative Negative     Recent Labs   Lab Test 08/10/21  0742 08/09/21  1845   HGB 9.8* 12.5     Recent Labs   Lab Test 01/20/21  0000   RUQIGG 4.27

## 2021-08-11 NOTE — PLAN OF CARE
Patient meeting expected goals for this shift.  Using toradol, tylenol and oxycodone for pain/comfort.  Independent in all self cares.  Working on pumping for  down in NICU.   present at bedside and supportive.  Continue to monitor and notify MD as needed.

## 2021-08-12 VITALS
TEMPERATURE: 98.3 F | RESPIRATION RATE: 16 BRPM | DIASTOLIC BLOOD PRESSURE: 83 MMHG | OXYGEN SATURATION: 100 % | HEART RATE: 94 BPM | SYSTOLIC BLOOD PRESSURE: 120 MMHG

## 2021-08-12 PROCEDURE — 250N000013 HC RX MED GY IP 250 OP 250 PS 637: Performed by: OBSTETRICS & GYNECOLOGY

## 2021-08-12 RX ORDER — IBUPROFEN 800 MG/1
800 TABLET, FILM COATED ORAL EVERY 6 HOURS PRN
Qty: 30 TABLET | Refills: 0 | Status: SHIPPED | OUTPATIENT
Start: 2021-08-12 | End: 2023-04-19

## 2021-08-12 RX ORDER — AMOXICILLIN 250 MG
1 CAPSULE ORAL 2 TIMES DAILY
Qty: 30 TABLET | Refills: 0 | Status: SHIPPED | OUTPATIENT
Start: 2021-08-12 | End: 2021-08-25

## 2021-08-12 RX ORDER — OXYCODONE HYDROCHLORIDE 5 MG/1
5 TABLET ORAL EVERY 6 HOURS PRN
Qty: 18 TABLET | Refills: 0 | Status: SHIPPED | OUTPATIENT
Start: 2021-08-12 | End: 2021-08-15

## 2021-08-12 RX ADMIN — SENNOSIDES AND DOCUSATE SODIUM 2 TABLET: 8.6; 5 TABLET ORAL at 08:13

## 2021-08-12 RX ADMIN — FERROUS SULFATE TAB 325 MG (65 MG ELEMENTAL FE) 325 MG: 325 (65 FE) TAB at 08:14

## 2021-08-12 RX ADMIN — OXYCODONE HYDROCHLORIDE 5 MG: 5 TABLET ORAL at 02:31

## 2021-08-12 RX ADMIN — IBUPROFEN 800 MG: 400 TABLET ORAL at 04:30

## 2021-08-12 RX ADMIN — IBUPROFEN 800 MG: 400 TABLET ORAL at 10:23

## 2021-08-12 RX ADMIN — ACETAMINOPHEN 975 MG: 325 TABLET, FILM COATED ORAL at 04:30

## 2021-08-12 RX ADMIN — OXYCODONE HYDROCHLORIDE 5 MG: 5 TABLET ORAL at 08:14

## 2021-08-12 RX ADMIN — ACETAMINOPHEN 975 MG: 325 TABLET, FILM COATED ORAL at 10:24

## 2021-08-12 NOTE — DISCHARGE SUMMARY
[facility] Discharge Summary    Philip Pfeiffer MRN# [id number]   Age: 30 year old YOB: 1990     Date of Admission:  8/10/2021  Date of Discharge::  2021  Admitting Physician:  Keli Coyne MD  Discharge Physician:  Annalee Greenberg MD   Home clinic: [facility]          Admission Diagnoses:   Postpartum from  Delivery          Discharge Diagnosis:   Postpartum from  Delivery          Procedures:   Procedure(s): None during this admission                  Discharge Medications:     Current Discharge Medication List      START taking these medications    Details   oxyCODONE (ROXICODONE) 5 MG tablet Take 1 tablet (5 mg) by mouth every 6 hours as needed for moderate to severe pain  Qty: 18 tablet, Refills: 0    Associated Diagnoses:  delivery delivered         CONTINUE these medications which have CHANGED    Details   ibuprofen (ADVIL/MOTRIN) 800 MG tablet Take 1 tablet (800 mg) by mouth every 6 hours as needed for other (cramping)  Qty: 30 tablet, Refills: 0    Associated Diagnoses:  delivery delivered      senna-docusate (SENOKOT-S/PERICOLACE) 8.6-50 MG tablet Take 1 tablet by mouth 2 times daily  Qty: 30 tablet, Refills: 0    Associated Diagnoses:  delivery delivered         CONTINUE these medications which have NOT CHANGED    Details   acetaminophen (TYLENOL) 325 MG tablet Take 2 tablets (650 mg) by mouth every 6 hours as needed for mild pain Start after Delivery.  Qty: 100 tablet, Refills: 0    Associated Diagnoses: Single liveborn, born in hospital, delivered by  section      Doxylamine Succinate, Sleep, (UNISOM PO) Take 50 mg by mouth      Prenatal Vit-Fe Fumarate-FA (PRENATAL VITAMIN PO)                    Consultations:   No consultations were requested during this admission          Brief History of Labor or Admission:   Philip was transferred from [place] 2 hours after an Emergent   section for vaginal bleeding during her induction of labor. The  Intensive Care Unit was closed there and she was transferred to [facility] to be near her  who required NICU care.           Hospital Course:   The patient's hospital course was unremarkable.  She recovered as anticipated and experienced no post-operative complications.  On discharge, her pain was well controlled. Vaginal bleeding is similar to peak menstrual flow.  Voiding without difficulty.  Ambulating well and tolerating a normal diet.  No fever or significant wound drainage.  Breastfeeding well.  Infant is stable.  She was discharged on post-partum day #3. She experienced acute blood loss anemia from her  delivery but was clinically stable.    Post-partum hemoglobin:   Hemoglobin   Date Value Ref Range Status   08/10/2021 9.8 (L) 11.7 - 15.7 g/dL Final   2021 11.8 11.7 - 15.7 g/dL Final             Discharge Instructions and Follow-Up:   Discharge diet: Regular   Discharge activity: Lifting restricted to 25 pounds   Discharge follow-up: Follow up with primary OB provider in 6 weeks   Wound care: Keep wound clean and dry           Discharge Disposition:   Discharged to home       Keli Coyne MD

## 2021-08-12 NOTE — DISCHARGE INSTRUCTIONS

## 2021-08-12 NOTE — PLAN OF CARE
VSS. Pain well controlled. Pumping, starting to feel engorged-heat/massage applied before and during pumping, ice afterwards-pt reported relief. Plan to discharge later today. Will continue to monitor.

## 2021-08-12 NOTE — PROGRESS NOTES
Philip Pfeiffer  August 12, 2021    S: pt is doing well.  Tolerating po intake and pain is well controlled.      O:/75   Pulse 98   Temp 98.2  F (36.8  C) (Oral)   Resp 16   LMP 10/26/2020   SpO2 100%   Recent Labs   Lab 08/10/21  0742 08/09/21  1845   HGB 9.8* 12.5     Abdomen: soft, non distended, fundus firm below the umbilicus.  Incision is C/D/I  Ext: non tender, no edema or erythema    A/P: s/p LTCS POD #3  Doing well  Continue care  Discharge planning for today  She was transferred to [facility] for NICU access    Annalee Greenberg MD

## 2021-08-12 NOTE — PLAN OF CARE
VSS. Voiding without difficulty. Scant vaginal bleeding. Incision WDL. Pain controlled with ibuprofen, tylenol, and oxycodone. Using abd binder for comfort. Pumping for infant in NICU.  at bedside, supportive and involved in cares. Will continue to monitor.

## 2021-08-12 NOTE — LACTATION NOTE
"Discharge Lactation visit with Philip, significant other Luke. [place] girl is in the NICU. Philip started pumping every 3 hours after birth, and her milk has come in overnight! She's engorged. Primary RN assisted with heat prior to pumping, massage during pump session, and cold packs after pumping at last pump session with some relief. Encouraged Philip to continue with these comfort measures and frequent pump sessions. Encouraged to continue pumping every 2-3 hours, with one 4 hour stretch overnight. Planning to discharge later today. Discussed hands on pumping. Reviewed Midisolairehony settings with Philip, and she pumped in maintenance mode at last pump since milk volumes had increased above 20ml. She's using hands free bra for pumping. Planning to work on breastfeeding in NICU within the hour. Made sure to let Philip know she's doing a great job.  Philip has a pump for home use.  \"Milk Making Reminders\" and \"Pump volume log\" given and reviewed. Encouraged watching \"Bill Maximizing Milk\" video online. Made Philip aware Lactation can continue to support as infant continues care in NICU. Lactation outpatient resources reviewed.    Harriet Harrison, RN-C, IBCLC, MNN, PHN, BSN    "

## 2021-08-12 NOTE — PLAN OF CARE
D: VSS, assessments WDL.   I: Pt. received complete discharge paperwork and home medications as filled by discharge pharmacy here.  Pt. was given times of last dose for all discharge medications in writing on discharge medication sheets.  Discharge teaching included home medication, pain management, activity restrictions, postpartum cares, and signs and symptoms of infection.    A: Discharge outcomes on care plan met.  Mother states understanding and comfort with self and baby cares.  P: Pt. discharged to home.  Pt. was discharged with baby, and bands were checked at time of discharge.  Pt. was accompanied by , nurse and baby, and left with personal belongings.  Home care referral sent.  Pt. to follow up with OB per MD order.  Pt. had no further questions at the time of discharge and no unmet needs were identified.

## 2021-08-12 NOTE — PLAN OF CARE
Vital signs stable, assessment WNL. Pain controlled with Tylenol,  ibuprofen, and oxycodone; states satisfaction with pain management. Up in room and to NICU independently with no complaints of dizziness, voiding without difficulty.  at bedside and supportive. Pumping for baby in NICU. Encouraged to call RN with needs, will continue to monitor.

## 2021-08-13 ENCOUNTER — TELEPHONE (OUTPATIENT)
Dept: FAMILY MEDICINE | Facility: CLINIC | Age: 31
End: 2021-08-13

## 2021-08-13 ENCOUNTER — TELEPHONE (OUTPATIENT)
Dept: MIDWIFE SERVICES | Facility: CLINIC | Age: 31
End: 2021-08-13

## 2021-08-13 NOTE — ANESTHESIA POSTPROCEDURE EVALUATION
Patient: Philip Pfeiffer    Procedure(s):   SECTION    Diagnosis:* No pre-op diagnosis entered *  Diagnosis Additional Information: No value filed.    Anesthesia Type:  Spinal    Note:  Disposition: Inpatient   Postop Pain Control: Uneventful            Sign Out: Well controlled pain   PONV: No   Neuro/Psych: Uneventful            Sign Out: Acceptable/Baseline neuro status   Airway/Respiratory: Uneventful            Sign Out: Acceptable/Baseline resp. status   CV/Hemodynamics: Uneventful            Sign Out: Acceptable CV status; No obvious hypovolemia; No obvious fluid overload   Other NRE: NONE   DID A NON-ROUTINE EVENT OCCUR? No    Event details/Postop Comments:  Doing well. Alert, oriented. Spinal block wearing off - able to move feet/legs. Patient denies any concerns.              Last vitals:  Vitals Value Taken Time   /74 08/10/21 0045   Temp 37  C (98.6  F) 21 2300   Pulse 93 08/10/21 0045   Resp 16 08/10/21 0045   SpO2 97 % 08/10/21 0045       Electronically Signed By: Munira Silva MD  2021  11:04 AM

## 2021-08-13 NOTE — TELEPHONE ENCOUNTER
Form received via fax. Filled out, attached pregnancy episode, and placed in Felisa's basket for signature.     Jolie  Surgery Scheduler

## 2021-08-13 NOTE — TELEPHONE ENCOUNTER
Patient called to schedule 2 wk post partum f/u.  She wanted to be scheduled with midwife that she had been seeing throughout pregnancy but [facility].    Scheduled for 8/25/21    She also had question regarding rash on abdomen from belly band.  She states light rash that is itchy.  Advised she could use a bit of anti-itch lotion (like CeraVe) or regular lotion and to make sure completely absorbed and skin is dry before putting belly band back on.  She could also wear a cotton shirt under belly band if band material irritating.      Jamila Martini RN  [facility]

## 2021-08-17 NOTE — TELEPHONE ENCOUNTER
Forms signed by provider, faxed back to number listed on form.     [facility]  Surgery Scheduler

## 2021-08-25 ENCOUNTER — OFFICE VISIT (OUTPATIENT)
Dept: MIDWIFE SERVICES | Facility: CLINIC | Age: 31
End: 2021-08-25
Payer: COMMERCIAL

## 2021-08-25 VITALS
DIASTOLIC BLOOD PRESSURE: 82 MMHG | SYSTOLIC BLOOD PRESSURE: 127 MMHG | WEIGHT: 177 LBS | TEMPERATURE: 97.2 F | BODY MASS INDEX: 28.58 KG/M2 | HEART RATE: 97 BPM

## 2021-08-25 PROCEDURE — 99207 PR POST PARTUM EXAM: CPT | Performed by: ADVANCED PRACTICE MIDWIFE

## 2021-08-25 NOTE — PROGRESS NOTES
SUBJECTIVE:   Philip Pfeiffer is a  here for a 2-week postpartum incision checkup.     HPI: Philip feels well, is adjusting to life with her  and has experienced a normal recovery without pain or complications. She denies incisional pain. Vaginal bleeding is scant. She is bonding well with her baby, Ann Marie Serrano. Happy she is home after 9 day NICU stay.     Today's Depression Rating was No Value exists for the : [id number]    DELIVERY DATE: 21  c/s for abnormal FHT of a viable girl, weight 8 pounds 1 oz., with Fetal distress complications.  FEEDING METHOD:    CONTRACEPTION PLANNED: Nexplanon  HX OF DEPRESSION:No  HX OF ABUSE:No  OTHER HPI: She has no signs of infection, bleeding or other complications. Incision is healing well, steristrips removed with exam     EXAM:  /82   Pulse 97   Temp 97.2  F (36.2  C)   Wt 80.3 kg (177 lb)   LMP 10/26/2020   Breastfeeding Yes   BMI 28.58 kg/m    GENERAL APPEARANCE: healthy, alert and no distress  NECK: thyroid normal to palpation  BREAST: soft, nontender, nipples intact, lactating   ABDOMEN: soft, nontender, Incision appears to be healing well without signs of infection.   PSYCH: mentation appears normal and affect normal/bright        ASSESSMENT:   Normal postpartum exam after .    PLAN:    RTC in 4 wks for 6-wk postpartum visit.     Nissa Beltre, BALJEET, APRN ANKITAM,CNM

## 2021-09-22 LAB
PATH REPORT.COMMENTS IMP SPEC: NORMAL
PATH REPORT.COMMENTS IMP SPEC: NORMAL
PATH REPORT.FINAL DX SPEC: NORMAL
PATH REPORT.GROSS SPEC: NORMAL
PATH REPORT.MICROSCOPIC SPEC OTHER STN: NORMAL
PATH REPORT.RELEVANT HX SPEC: NORMAL
PHOTO IMAGE: NORMAL

## 2021-09-22 PROCEDURE — 88307 TISSUE EXAM BY PATHOLOGIST: CPT | Mod: 26 | Performed by: PATHOLOGY

## 2021-09-23 ENCOUNTER — OFFICE VISIT (OUTPATIENT)
Dept: MIDWIFE SERVICES | Facility: CLINIC | Age: 31
End: 2021-09-23
Payer: COMMERCIAL

## 2021-09-23 VITALS
DIASTOLIC BLOOD PRESSURE: 78 MMHG | SYSTOLIC BLOOD PRESSURE: 124 MMHG | HEART RATE: 94 BPM | WEIGHT: 179 LBS | BODY MASS INDEX: 28.91 KG/M2 | OXYGEN SATURATION: 95 %

## 2021-09-23 DIAGNOSIS — Z30.017 INSERTION OF IMPLANTABLE SUBDERMAL CONTRACEPTIVE: ICD-10-CM

## 2021-09-23 DIAGNOSIS — Z30.017 NEXPLANON INSERTION: Primary | ICD-10-CM

## 2021-09-23 LAB — HCG UR QL: NEGATIVE

## 2021-09-23 PROCEDURE — 81025 URINE PREGNANCY TEST: CPT | Performed by: ADVANCED PRACTICE MIDWIFE

## 2021-09-23 PROCEDURE — 11981 INSERTION DRUG DLVR IMPLANT: CPT | Performed by: ADVANCED PRACTICE MIDWIFE

## 2021-09-23 NOTE — PROGRESS NOTES
Nexplanon Insertion:    Is a pregnancy test required: Yes.  Was it positive or negative?  Negative  Was a consent obtained?  Yes    Subjective: Philip Pfeiffer is a 31 year old  presents for Nexplanon.    Patient has been given the opportunity to ask questions about all forms of birth control, including all options appropriate for Philip Pfeiffer. Discussed that no method of birth control, except abstinence is 100% effective against pregnancy or sexually transmitted infection.     Philip Pfeiffer understands she may have the Nexplanon removed at any time and it should be removed by a health care provider.    The entire insertion procedure was reviewed with the patient, including care after placement.    Patient's last menstrual period was 10/26/2020. Last sexual activity: before the end of pregnancy. No allergy to betadine or shellfish. Patient declines STD screening  hCG Urine Qualitative   Date Value Ref Range Status   2021 Negative Negative Final     Comment:     This test is for screening purposes.  Results should be interpreted along with the clinical picture.  Confirmation testing is available if warranted by ordering [id number], HCG Quantitative Pregnancy.         /78   Pulse 94   Wt 81.2 kg (179 lb)   LMP 10/26/2020   SpO2 95%   BMI 28.91 kg/m      PROCEDURE NOTE: -- Nexplanon Insertion    Reason for Insertion: contraception    Patient was placed supine with right arm exposed.  Maribel was made 8-10 cm above medial epicondyle and a guiding maribel 4 cm above the first.  Arm was prepped with Betadine. Insertion point was anesthetized with 3 mL 1% lidocaine. After stretching the skin with thumb and index finger around the insertion site, skin punctured with the tip of the needle inserted at 30 degrees and then lowered to horizontal position. The needle was then advanced to its full length. Applicator was then stabilized and slider was unlocked. Slider was pulled back until it stopped and then  removed.    Correct placement of the implant was confirmed by palpation in the patient's arm and visualizing the purple top of the obturator.   Bandage and pressure dressing applied to insertion site.    Lot # [id number]  Exp: 1/24/2024    EBL: minimal    Complications: none    ASSESSMENT:     ICD-10-CM    1. Nexplanon insertion  Z30.017 HCG Qual, Urine ([id number])        PLAN:    Given 's handouts, including when to have Nexplanon removed, list of danger s/sx, side effects and follow up recommended. Encouraged condom use for prevention of STD. Back up contraception advised for 7 days. Advised to call for any fever, for prolonged or severe pain or bleeding, abnormal vaginal dischage. She was advised to use pain medications (ibuprofen) as needed for mild to moderate pain.     Nissa Beltre CNM, AGUSTIN DELONG

## 2021-10-06 ENCOUNTER — APPOINTMENT (OUTPATIENT)
Dept: URBAN - METROPOLITAN AREA CLINIC 260 | Age: 31
Setting detail: DERMATOLOGY
End: 2021-10-07

## 2021-10-06 VITALS — WEIGHT: 172 LBS | HEIGHT: 66 IN

## 2021-10-06 DIAGNOSIS — L81.4 OTHER MELANIN HYPERPIGMENTATION: ICD-10-CM

## 2021-10-06 DIAGNOSIS — D22 MELANOCYTIC NEVI: ICD-10-CM

## 2021-10-06 DIAGNOSIS — L82.1 OTHER SEBORRHEIC KERATOSIS: ICD-10-CM

## 2021-10-06 DIAGNOSIS — Z71.89 OTHER SPECIFIED COUNSELING: ICD-10-CM

## 2021-10-06 DIAGNOSIS — D18.0 HEMANGIOMA: ICD-10-CM

## 2021-10-06 PROBLEM — D23.72 OTHER BENIGN NEOPLASM OF SKIN OF LEFT LOWER LIMB, INCLUDING HIP: Status: ACTIVE | Noted: 2021-10-06

## 2021-10-06 PROBLEM — D18.01 HEMANGIOMA OF SKIN AND SUBCUTANEOUS TISSUE: Status: ACTIVE | Noted: 2021-10-06

## 2021-10-06 PROBLEM — D22.5 MELANOCYTIC NEVI OF TRUNK: Status: ACTIVE | Noted: 2021-10-06

## 2021-10-06 PROCEDURE — OTHER COUNSELING: OTHER

## 2021-10-06 PROCEDURE — 99212 OFFICE O/P EST SF 10 MIN: CPT

## 2021-10-06 ASSESSMENT — LOCATION SIMPLE DESCRIPTION DERM: LOCATION SIMPLE: UPPER BACK

## 2021-10-06 ASSESSMENT — LOCATION ZONE DERM: LOCATION ZONE: TRUNK

## 2021-10-06 ASSESSMENT — LOCATION DETAILED DESCRIPTION DERM: LOCATION DETAILED: INFERIOR THORACIC SPINE

## 2021-10-12 ENCOUNTER — MEDICAL CORRESPONDENCE (OUTPATIENT)
Dept: HEALTH INFORMATION MANAGEMENT | Facility: CLINIC | Age: 31
End: 2021-10-12

## 2021-12-15 ENCOUNTER — MEDICAL CORRESPONDENCE (OUTPATIENT)
Dept: HEALTH INFORMATION MANAGEMENT | Facility: CLINIC | Age: 31
End: 2021-12-15
Payer: COMMERCIAL

## 2021-12-20 ENCOUNTER — RX ONLY (RX ONLY)
Age: 31
End: 2021-12-20

## 2021-12-20 RX ORDER — VALACYCLOVIR 1 G/1
TABLET, FILM COATED ORAL
Qty: 30 | Refills: 0 | Status: ERX

## 2022-02-23 ENCOUNTER — MEDICAL CORRESPONDENCE (OUTPATIENT)
Dept: HEALTH INFORMATION MANAGEMENT | Facility: CLINIC | Age: 32
End: 2022-02-23
Payer: COMMERCIAL

## 2022-04-08 ENCOUNTER — MYC MEDICAL ADVICE (OUTPATIENT)
Dept: MIDWIFE SERVICES | Facility: CLINIC | Age: 32
End: 2022-04-08
Payer: COMMERCIAL

## 2022-04-08 DIAGNOSIS — Z91.89 AT RISK FOR DEPRESSED MOOD DURING POSTPARTUM PERIOD: Primary | ICD-10-CM

## 2022-04-08 NOTE — TELEPHONE ENCOUNTER
Message sent to Leticia Vieyra and Adriana Pandey [facility] in clinic. Referral placed to mental health and encouraged patient to seek out of system mental health support if desired sooner care. MyChart message to patient encouraging clinic visit for further assessment and detailing resources/referrals placed.    AGUSTIN Duffy CNM

## 2022-04-25 ENCOUNTER — TELEPHONE (OUTPATIENT)
Dept: BEHAVIORAL HEALTH | Facility: CLINIC | Age: 32
End: 2022-04-25
Payer: COMMERCIAL

## 2022-04-25 NOTE — TELEPHONE ENCOUNTER
Writer left a vm for patient to call intake back to complete  VERBAL  General Consent or log into [facility] to complete Consent with appointment at pre-check in.

## 2022-04-27 ENCOUNTER — OFFICE VISIT (OUTPATIENT)
Dept: BEHAVIORAL HEALTH | Facility: CLINIC | Age: 32
End: 2022-04-27
Payer: COMMERCIAL

## 2022-04-27 DIAGNOSIS — Z91.89 AT RISK FOR DEPRESSED MOOD DURING POSTPARTUM PERIOD: ICD-10-CM

## 2022-04-27 PROCEDURE — 90791 PSYCH DIAGNOSTIC EVALUATION: CPT | Performed by: SOCIAL WORKER

## 2022-04-27 ASSESSMENT — COLUMBIA-SUICIDE SEVERITY RATING SCALE - C-SSRS
6. HAVE YOU EVER DONE ANYTHING, STARTED TO DO ANYTHING, OR PREPARED TO DO ANYTHING TO END YOUR LIFE?: NO
ATTEMPT LIFETIME: NO
TOTAL  NUMBER OF INTERRUPTED ATTEMPTS LIFETIME: NO
2. HAVE YOU ACTUALLY HAD ANY THOUGHTS OF KILLING YOURSELF?: NO
TOTAL  NUMBER OF ABORTED OR SELF INTERRUPTED ATTEMPTS LIFETIME: NO
1. HAVE YOU WISHED YOU WERE DEAD OR WISHED YOU COULD GO TO SLEEP AND NOT WAKE UP?: NO

## 2022-04-27 NOTE — PROGRESS NOTES
"[facility]- Integrated Behavioral Health Services  Provider Name:  Leticia Jarrell     Credentials:  MSW, PIETER    PATIENT'S NAME: Philip Pfeiffer  PREFERRED NAME: Philip  PRONOUNS:   she, her, hers    MRN: [id number]  : 1990  ADDRESS: [address] [place] [address] [place] [address]  [facility]:  [id number]  DATE OF SERVICE: 22  START TIME: 8:33 am  END TIME: 9:30 am  PREFERRED PHONE: [phone]  May we leave a program related message: Yes  SERVICE MODALITY:  In-person    UNIVERSAL ADULT Mental Health DIAGNOSTIC ASSESSMENT    Identifying Information:  Patient is a 31 year old,  .  The pronoun use throughout this assessment reflects the patient's chosen pronoun.  Patient was referred for an assessment by self and CNM.  Patient attended the session alone.    Chief Complaint:   The reason for seeking services at this time is: \"Anxiety, mood swings, tools to help me\".  The problem(s) began 2021.    Patient stated that she is currently 8 months postpartum. She stated that she started to notice mood swings in her second trimester. Patient stated that she had an overall healthy pregnancy and went into labor around 41 weeks. Patient stated that labor had been progressing, but when she was 5 cm dilated, she experienced a gush of blood and it was determined that she had a placental abruption. Patient stated that they had to quickly move forward with an emergency , but then quickly determined that she did not need to go under general anesthesia that allowed her  to be present during the delivery.  Due to difficulties breathing, her daughter was admitted to the NICU for 9 days, but this experience was complicated by the NICU being closed where she delivered. Her daughter was transferred to a nearby NICU, with patient then being transferred afterwards.  Due to these complications, patient started to exclusively pump versus breastfeeding which was " "important to her.     Patient stated that overall she feels like she has been able to cope with her childbirth experience, but does identify it as traumatic. She stated that being in the clinic today where labor started and then seeing reminders on social media or hearing friends talk about their experiences can cause her to think about her experience and wonder why it happened to her. Patient denied additional trauma reactions and response, but she does wonder what it would be like to move forward with a second pregnancy after her experience. Patient reported that she can feel like she missed out on the opportunity to have a vaginal birth, feels sadness when she thinks about how sick she felt when she met her daughter, and has grieved being unable to breastfeed.     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 Patient is noticing one day a week where she can feel highly tearful, easily overwhelmed, and is unsure why. Patient stated that she can also be irritable at work. Patient stated that she would like to learn strategies to help her manage these changes in her mood.     Patient has not attempted to resolve these concerns in the past.    Social/Family History:  Patient reported they grew up in  [place], [place].  They were raised by biological parents  .  Parents were always together.  Patient reported that their childhood was : \"overall great\". Patient shared that both her parents were present. 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 Patient stated that she has two older sisters and one brother.  Patient described their current relationships with family of origin as \"very close\". Patient stated that she lives within blocks of one sister and she feels like it is easy to reach out to family to ask for help and to ask questions without feeling guilty.     The patient describes their cultural background as .  Cultural influences and " "impact on patient's life structure, values, norms, and healthcare: [facility] town.  Patient stated that she is family focused and her family values helping each other out. Contextual influences on patient's health include: Individual Factors : 8 months postpartum, starting leadership position in job and Family Factors : family focused, supportive family.    These factors will be addressed in the Preliminary Treatment plan. Patient identified their preferred language to be English. Patient reported they does not need the assistance of an  or other support involved in therapy.     Patient reported had no significant delays in developmental tasks.   Patient's highest education level was college graduate  .  Patient identified the following learning problems: none reported.  Modifications will not be used to assist communication in therapy.  Patient reports they are  able to understand written materials.    Patient reported the following relationship history : prior casual dating relationships.  Patient's current relationship status is  for 4 years. Patient identified marriage as \"amazing\".  She stated that they have been able to navigate well as a couple to parenting and have been able to find a routine that works for them as they co-parent and manage their home. Patient identified their sexual orientation as heterosexual.  Patient reported having 1 child(cata): Ann Marie who is 8 months old. Patient stated that she enjoys being her mother and identified her daughter as a \"happy baby\". Patient identified parents; siblings; spouse as part of their support system.  Patient identified the quality of these relationships as stable and meaningful,  .  Patient would like to be more integrated in the parenting community, but can feel anxious and nervous when she thinks about meeting new people and leaving her home with her daughter and all baby supplies.    Patient's current living/housing situation involves staying " in own home/apartment.  The immediate members of family and household include Luke Pfeiffer, 31, , their daughter, and they report that housing is stable.    Patient is currently employed fulltime. Patient reported that she works 4 days a week and reduced her hours in order to be at home with her daughter one day per week. 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 Patient has worked for her clinic for 4 years.  Patient reports their finances are obtained through employment. Patient does identify finances as a current stressor.      Patient reported that they have not been involved with the legal system. .Patient does not report being under probation/ parole/ jurisdiction.     Patient's Strengths and Limitations:  Patient identified the following strengths or resources that will help them succeed in treatment: commitment to health and well being, family support, insight, intelligence, motivation and work ethic. Things that may interfere with the patient's success in treatment include: none identified.     Assessments:  The following assessments were completed by patient for this visit:     PHQ9:   PHQ-9 SCORE 4/23/2021 4/28/2022   PHQ-9 Total Score MyChart - 1 (Minimal depression)   PHQ-9 Total Score 1 1     GAD7:   ISAEL-7 SCORE 4/28/2022   Total Score 3 (minimal anxiety)   Total Score 3     CAGE-AID:   CAGE-AID Total Score 4/25/2022   Total Score 0   Total Score MyChart 0 (A total score of 2 or greater is considered clinically significant)     PROMIS 10-Global Health (only subscores and total score):   PROMIS-10 Scores Only 4/28/2022   Global Mental Health Score 14   Global Physical Health Score 17   PROMIS TOTAL - SUBSCORES 31        [place] Suicide Severity Rating Scale (Lifetime/Recent)  [place] Suicide Severity Rating (Lifetime/Recent) 8/10/2021 4/27/2022   Q1 Wished to be Dead (Past Month) no -   Q2 Suicidal  Thoughts (Past Month) no -   1. Wish to be Dead (Lifetime) - 0   2. Non-Specific Active Suicidal Thoughts (Lifetime) - 0   Actual Attempt (Lifetime) - 0   Has subject engaged in non-suicidal self-injurious behavior? (Lifetime) - 0   Interrupted Attempts (Lifetime) - 0   Aborted or Self-Interrupted Attempt (Lifetime) - 0   Preparatory Acts or Behavior (Lifetime) - 0   Calculated C-SSRS Risk Score (Lifetime/Recent) - No Risk Indicated     Personal and Family Medical History:  Patient does not report a family history of mental health concerns.  Patient reports family history includes Diabetes Type 2  in her mother; Obesity in her mother; Sjogren's in her maternal grandmother and mother..  Sisters experienced postpartum anxiety.  Father abused alcohol and she has been sober since patient was born.    Patient does not report Mental Health Diagnosis and/or Treatment.       Patient has had a physical exam to rule out medical causes for current symptoms.  Date of last physical exam was within the past year. Client was encouraged to follow up with PCP if symptoms were to develop. The patient has a [facility] Provider, who is named Gabriela Villanueva..  Patient reports no current medical concerns.  Patient denies any issues with pain..   There are not significant appetite / nutritional concerns / weight changes.   Patient does report a history of head injury / trauma / cognitive impairment.      Patient reports not taking any current medications    Medication Adherence:  Patient reports not currently prescribed.    Patient Allergies:    Allergies   Allergen Reactions     Vancomycin Hives and Itching       Medical History:  No past medical history on file.      Current Mental Status Exam:   Appearance:  Appropriate    Eye Contact:  Good   Psychomotor:  Normal       Gait / station:  no problem  Attitude / Demeanor: Cooperative  Interested Pleasant  Speech      Rate / Production: Normal/ Responsive       Volume:  Normal  volume      Language:  intact  Mood:   Anxious  Normal  Affect:   Appropriate    Thought Content: Clear   Thought Process: Coherent  Goal Directed  Logical       Associations: No loosening of associations  Insight:   Good   Judgment:  Intact   Orientation:  All  Attention/concentration: Good    Substance Use:  Patient did report a family history of substance use concerns; see medical history section for details.  Patient has not received chemical dependency treatment in the past.  Patient has not ever been to detox.      Patient is not currently receiving any chemical dependency treatment.       Substance History of use Age of first use Date of last use     Pattern and duration of use (include amounts and frequency)   Alcohol currently use   18 4/21/2022 Infrequent use, one drink at a time.   Cannabis   never used     REPORTS SUBSTANCE USE: N/A     Amphetamines   never used     REPORTS SUBSTANCE USE: N/A   Cocaine/crack    never used       REPORTS SUBSTANCE USE: N/A   Hallucinogens never used         REPORTS SUBSTANCE USE: N/A   Inhalants never used         REPORTS SUBSTANCE USE: N/A   Heroin never used         REPORTS SUBSTANCE USE: N/A   Other Opiates never used     REPORTS SUBSTANCE USE: N/A   Benzodiazepine   never used     REPORTS SUBSTANCE USE: N/A   Barbiturates never used     REPORTS SUBSTANCE USE: N/A   Over the counter meds never used     REPORTS SUBSTANCE USE: N/A   Caffeine currently use 9?   Drinks 4 cups of coffee per day.   Nicotine  never used     REPORTS SUBSTANCE USE: N/A   Other substances not listed above:  Identify:  never used     REPORTS SUBSTANCE USE: N/A     Patient reported the following problems as a result of their substance use: no problems, not applicable.    Substance Use: No symptoms    Based on the negative CAGE score and clinical interview there  are not indications of drug or alcohol abuse.    Significant Losses / Trauma / Abuse / Neglect Issues:   Patient did not   serve in the .  There are indications or report of significant loss, trauma, abuse or neglect issues related to: Multiple surgeries at age 14, traumatic birth with daughter's NICU admission.  Concerns for possible neglect are not present.     Safety Assessment:   Patient denies current homicidal ideation and behaviors.  Patient denies current self-injurious ideation and behaviors.    Patient denied risk behaviors associated with substance use.  Patient denies any high risk behaviors associated with mental health symptoms.  Patient reports the following current concerns for their personal safety: None.  Patient reports there are not firearms in the house.       History of Safety Concerns:  Patient denied a history of homicidal ideation.     Patient denied a history of personal safety concerns.    Patient denied a history of assaultive behaviors.    Patient denied a history of sexual assault behaviors.     Patient denied a history of risk behaviors associated with substance use.  Patient denies any history of high risk behaviors associated with mental health symptoms.  Patient reports the following protective factors: dedication to family or friends; safe and stable environment; regular sleep; effective problem solving skills; positive social skills; financial stability; strong sense of self worth or esteem; sense of personal control or determination    Risk Plan:  See Recommendations for Safety and Risk Management Plan    Review of Symptoms per patient report:  Depression: Irritability and Feeling sad, down, or depressed  Britt:  No Symptoms  Psychosis: No Symptoms  Anxiety: Nervousness, Ruminations and Irritability  Panic:  No symptoms  Post Traumatic Stress Disorder:  No Symptoms   Eating Disorder: No Symptoms  ADD / ADHD:  No symptoms  Conduct Disorder: No symptoms  Autism Spectrum Disorder: No symptoms  Obsessive Compulsive Disorder: No Symptoms    Patient reports the following compulsive behaviors and  treatment history: No symptoms.      Diagnostic Criteria:   Adjustment Disorder  A. The development of emotional or behavioral symptoms in response to an identifiable stressor(s) occurring within 3 months of the onset of the stressor(s)  B. These symptoms or behaviors are clinically significant, as evidenced by one or both of the following:       - Marked distress that is out of proportion to the severity/intensity of the stressor (with consideration for external context & culture)       - Significant impairment in social, occupational, or other important areas of functioning  C. The stress-related disturbance does not meet criteria for another disorder & is not not an exacerbation of another mental disorder  D. The symptoms do not represent normal bereavement  E. Once the stressor or its consequences have terminated, the symptoms do not persist for more than an additional 6 months       * Adjustment Disorder with Mixed Anxiety and Depressed Mood: The predominant manifestation is a combination of depression and anxiety    Functional Status:  Patient reports the following functional impairments:  management of the household and or completion of tasks and work / vocational responsibilities.     Nonprogrammatic care:  Patient is requesting basic services to address current mental health concerns.    Clinical Summary:  1. Reason for assessment: Patient expressing concerns about changes in her mood, including feeling irritable and more tearful a few days a month.  2. Psychosocial, Cultural and Contextual Factors: 8 months postpartum, childbirth trauma, recent job promotion .  3. Principal DSM5 Diagnoses  (Sustained by DSM5 Criteria Listed Above):   Adjustment Disorders  309.28 (F43.23) With mixed anxiety and depressed mood.  4. Other Diagnoses that is relevant to services: None identified  5. Provisional Diagnosis: None identified.  6. Prognosis: Expect Improvement.  7. Likely consequences of symptoms if not treated:  Without treatment, patient is at risk for worsening mental health .  8. Client strengths include:  creative, educated, empathetic, employed, goal-focused, good listener, insightful, intelligent, motivated, open to learning, open to suggestions / feedback, responsible parent, support of family, friends and providers, wants to learn, willing to ask questions and work history .     Recommendations:     1. Plan for Safety and Risk Management:   Recommended that patient call 911 or go to the local ED should there be a change in any of these risk factors.. Report to child / adult protection services was NA.     2. Patient's identified cultural concerns will be addressed by continuing to explore how to integrate family and familiy support in helping support patient's mental health.     3. Initial Treatment will focus on:    Mood Instability - irritable mood    Trauma- childbirth trauma.     4. Resources/Service Plan:    services are not indicated.   Modifications to assist communication are not indicated.   Additional disability accommodations are not indicated.      5. Collaboration:   Collaboration / coordination of treatment will be initiated with the following  support professionals: BALJE[facility].      6.  Referrals:   The following referral(s) will be initiated: No referral at this time. Patient will receive epsiode of care with this provider, including ART session for childbirth trauma . Next Scheduled Appointment: 5/13 at 3:00 pm.     A Release of Information has been obtained for the following: no ALDO needed.    7. JUNIOR:    JUNIOR:  Discussed the general effects of drugs and alcohol on health and well-being. Provider gave patient printed information about the effects of chemical use on their health and well being. Recommendations:  No additional recommendations needed .     8. Records:   These were reviewed at time of assessment.   Information in this assessment was obtained from the medical record and provided by  patient who is a good historian.   Patient will have open access to their mental health medical record.        Provider Name/ Credentials:  Leticia Vieyra [facility]  April 27, 2022

## 2022-05-13 ENCOUNTER — OFFICE VISIT (OUTPATIENT)
Dept: BEHAVIORAL HEALTH | Facility: CLINIC | Age: 32
End: 2022-05-13
Payer: COMMERCIAL

## 2022-05-13 DIAGNOSIS — F43.23 ADJUSTMENT DISORDER WITH MIXED ANXIETY AND DEPRESSED MOOD: Primary | ICD-10-CM

## 2022-05-13 PROCEDURE — 90832 PSYTX W PT 30 MINUTES: CPT | Performed by: SOCIAL WORKER

## 2022-05-13 NOTE — PROGRESS NOTES
[facility]  May 13, 2022    Behavioral Health Clinician Progress Note    Patient Name: Philip Pfeiffer           Service Type:  Individual      Service Location:   Face to Face in Clinic     Session Start Time: 3: 01 pm  Session End Time: 3: 34 pm      Session Length: 16 - 37      Attendees: Patient     Service Modality:  In-person    Visit Activities (Refresh list every visit): [facility] Only    Diagnostic Assessment Date: 4/27/22  Treatment Plan Review Date: 5/13/22  See Flowsheets for today's PHQ-9 and ISAEL-7 results  Previous PHQ-9:   PHQ-9 SCORE 4/23/2021 4/28/2022   PHQ-9 Total Score MyChart - 1 (Minimal depression)   PHQ-9 Total Score 1 1     Previous ISAEL-7:   ISAEL-7 SCORE 4/28/2022   Total Score 3 (minimal anxiety)   Total Score 3       MYRNA LEVEL:  No flowsheet data found.    DATA  Extended Session (60+ minutes): No  Interactive Complexity: No  Crisis: No  [facility] Patient: No    Treatment Objective(s) Addressed in This Session:  Target Behavior(s): mental health management    Depressed Mood: Decrease frequency and intensity of feeling down, depressed, hopeless  Anxiety: will experience a reduction in anxiety    Current Stressors / Issues:  Patient reported no significant changes since the first visit.  Patient informed of provider's resignation and explored potential plans for ongoing care. Patient open to creating treatment planning and goal setting today [facility]  Patient created goals and was open to starting to talk about how to help gain insight about potential triggers, cognitions, and sensations that lead to feeling more irritable through the use of a body scan 2-3 times per day.     Progress on Treatment Objective(s) / Homework:  New Objective established this session - ACTION (Actively working towards change); Intervened by reinforcing change plan / affirming steps taken    Motivational Interviewing    MI  Intervention: Expressed Empathy/Understanding, Permission to raise concern or advise, Open-ended questions, Change talk (evoked) and Reframe     Change Talk Expressed by the Patient: Reasons to change Need to change Committment to change    Provider Response to Change Talk: E - Evoked more info from patient about behavior change, A - Affirmed patient's thoughts, decisions, or attempts at behavior change, R - Reflected patient's change talk and S - Summarized patient's change talk statements    Also provided psychoeducation about behavioral health condition, symptoms, and treatment options    Care Plan review completed: Yes    Medication Review:  No current psychiatric medications prescribed    Medication Compliance:  NA    Changes in Health Issues:   None reported    Chemical Use Review:   Substance Use: Chemical use reviewed, no active concerns identified      Tobacco Use: No current tobacco use.      Assessment: Current Emotional / Mental Status (status of significant symptoms):  Risk status (Self / Other harm or suicidal ideation)  Patient denies a history of suicidal ideation, suicide attempts, self-injurious behavior, homicidal ideation, homicidal behavior and and other safety concerns  Patient denies current fears or concerns for personal safety.  Patient denies current or recent suicidal ideation or behaviors.  Patient denies current or recent homicidal ideation or behaviors.  Patient denies current or recent self injurious behavior or ideation.  Patient denies other safety concerns.  A safety and risk management plan has not been developed at this time, however patient was encouraged to call [place] [phone] should there be a change in any of these risk factors.    Appearance:   Appropriate   Eye Contact:   Good   Psychomotor Behavior: Normal   Attitude:   Cooperative   Orientation:   All  Speech   Rate / Production: Normal    Volume:  Normal   Mood:    Anxious   Affect:    Appropriate   Thought  Content:  Clear   Thought Form:  Coherent  Logical   Insight:    Good     Diagnoses:  1. Adjustment disorder with mixed anxiety and depressed mood        Collateral Reports Completed:  Not Applicable    Plan: (Homework, other):  Patient was given information about behavioral services and encouraged to schedule a follow up appointment with the clinic [facility] in 2 weeks.  She was also given homework to begin completing a body scan 2-3 times per day, with plan to begin tracking potential triggers, trends, and cognitions. Completed warm hand off to JULIO Padgett who patient will continue care with moving forward.  CD Recommendations: No indications of CD issues.  PIETER Montiel, [facility]      ______________________________________________________________________    Integrated Primary Care Behavioral Health Treatment Plan    Patient's Name: Philip Pfeiffer  YOB: 1990    Date of Creation: 5/13/22  Date Treatment Plan Last Reviewed/Revised: 5/13/22    DSM5 Diagnoses: Adjustment Disorders  309.28 (F43.23) With mixed anxiety and depressed mood  Psychosocial / Contextual Factors:  8 months postpartum, childbirth trauma, recent job promotion .  PROMIS (reviewed every 90 days):   Question 4/28/2022  2:30 PM CDT - Filed by Patient   In general, would you say your health is: Good   In general, would you say your quality of life is: Very good   In general, how would you rate your physical health? Good   In general, how would you rate your mental health, including your mood and your ability to think? Good   In general, how would you rate your satisfaction with your social activities and relationships? Good   In general, please rate how well you carry out your usual social activities and roles. (This includes activities at home, at work and in your community, and responsibilities as a parent, child, spouse, employee, friend, etc.) Good   To what extent are you able to carry out your everyday physical activities such  "as walking, climbing stairs, carrying groceries, or moving a chair? Completely   In the past 7 days    How often have you been bothered by emotional problems such as feeling anxious, depressed or irritable? Rarely   How would you rate your fatigue on average? Mild   How would you rate your pain on average?   0 = No Pain  to  10 = Worst Imaginable Pain 0       Referral / Collaboration:  Referral to another professional/service is not indicated at this time. Patient may be interested in ART for processing childbirth trauma prior to subsequent pregnancy. Will consider timing of this referral at a later date.    Anticipated number of session for this episode of care: 6-8  Anticipation frequency of session: Every other week  Anticipated Duration of each session: 38-52 minutes  Treatment plan will be reviewed in 90 days or when goals have been changed.     MeasurableTreatment Goal(s) related to diagnosis / functional impairment(s)  Goal 1: Patient will reduce frequency in which she feels like she is \"losing control\".     I will know I've met my goal when I feel like I am better able to manage how I feel when I am at work.      Objective #A (Patient Action)    Patient will use cognitive strategies identified in therapy to challenge anxious thoughts.  Status: New - Date: 5/13/22     Intervention(s)  Therapist will teach cognitive restructuring techniques.    Objective #B  Patient will identify three distraction and diversion activities and use those activities to decrease level of anxiety  .  Status: New - Date: 5/13/22     Intervention(s)  Therapist will assign homework to practice distress tolerance, mindfulness, and relaxation techniques.    Objective #C  Patient will increase insight about potential triggers, patterns, and early signs of changing moods.  Status: New - Date: 5/13/22     Intervention(s)  Therapist will assign homework to practice a body scan.      Patient has reviewed and agreed to the above " plan.      Leticia Vieyra, [facility]  May 13, 2022

## 2022-06-08 ENCOUNTER — OFFICE VISIT (OUTPATIENT)
Dept: BEHAVIORAL HEALTH | Facility: CLINIC | Age: 32
End: 2022-06-08
Payer: COMMERCIAL

## 2022-06-08 DIAGNOSIS — F43.23 ADJUSTMENT DISORDER WITH MIXED ANXIETY AND DEPRESSED MOOD: Primary | ICD-10-CM

## 2022-06-08 PROCEDURE — 90834 PSYTX W PT 45 MINUTES: CPT

## 2022-06-08 NOTE — PROGRESS NOTES
[facility]  June 8, 2022    Behavioral Health Clinician Progress Note    Patient Name: Philip Pfeiffer       Service Type:  Individual      Service Location:   Face to Face in Clinic     Session Start Time: 9:04 AM  Session End Time: 9:55 AM      Session Length: 38 - 52      Attendees: Patient     Service Modality:  In-person    Visit Activities (Refresh list every visit): [facility] Only    Diagnostic Assessment Date: 4/27/22  Treatment Plan Review Date: 5/13/22  See Flowsheets for today's PHQ-9 and ISAEL-7 results  Previous PHQ-9:   PHQ-9 SCORE 4/23/2021 4/28/2022   PHQ-9 Total Score MyChart - 1 (Minimal depression)   PHQ-9 Total Score 1 1     Previous ISAEL-7:   ISAEL-7 SCORE 4/28/2022   Total Score 3 (minimal anxiety)   Total Score 3       MYRNA LEVEL:  No flowsheet data found.    DATA  Extended Session (60+ minutes): No  Interactive Complexity: No  Crisis: No  [facility] Patient: No    Treatment Objective(s) Addressed in This Session:  Target Behavior(s): mental health management    Anxiety: will experience a reduction in anxiety, will develop more effective coping skills to manage anxiety symptoms and will develop healthy cognitive patterns and beliefs    Current Stressors / Issues:  [facility] visit with patient in clinic. First visit with patient following care transfer from [facility] colleague. Patient shared about recent anxiety symptoms and reported that body scans have been helpful. [facility] taught additional grounding/distress tolerance skills. [facility] and patient discussed interpreting events as challenges vs threats to mitigate fight or flight response.  No safety concerns, SI, or SIB.    Progress on Treatment Objective(s) / Homework:  Minimal progress - ACTION (Actively working towards change); Intervened by reinforcing change plan / affirming steps taken    Motivational Interviewing    MI Intervention: Expressed Empathy/Understanding, Permission to raise concern or advise, Open-ended questions and Reflections:  simple and complex     Change Talk Expressed by the Patient: Desire to change Ability to change Reasons to change Need to change Committment to change    Provider Response to Change Talk: E - Evoked more info from patient about behavior change, A - Affirmed patient's thoughts, decisions, or attempts at behavior change, R - Reflected patient's change talk and S - Summarized patient's change talk statements    Also provided psychoeducation about behavioral health condition, symptoms, and treatment options    Care Plan review completed: No    Medication Review:  No current psychiatric medications prescribed    Medication Compliance:  NA    Changes in Health Issues:   None reported    Chemical Use Review:   Substance Use: Chemical use reviewed, no active concerns identified      Tobacco Use: No current tobacco use.      Assessment: Current Emotional / Mental Status (status of significant symptoms):  Risk status (Self / Other harm or suicidal ideation)  Patient denies a history of suicidal ideation, suicide attempts, self-injurious behavior, homicidal ideation, homicidal behavior and and other safety concerns  Patient denies current fears or concerns for personal safety.  Patient denies current or recent suicidal ideation or behaviors.  Patient denies current or recent homicidal ideation or behaviors.  Patient denies current or recent self injurious behavior or ideation.  Patient denies other safety concerns.  A safety and risk management plan has not been developed at this time, however patient was encouraged to call [place] [phone] should there be a change in any of these risk factors.    Appearance:   Appropriate   Eye Contact:   Good   Psychomotor Behavior: Normal   Attitude:   Cooperative  Interested Friendly  Orientation:   All  Speech   Rate / Production: Normal    Volume:  Normal   Mood:    Anxious   Affect:    Appropriate   Thought Content:  Clear   Thought Form:  Coherent  Goal Directed  Logical    Insight:    Good     Diagnoses:  1. Adjustment disorder with mixed anxiety and depressed mood      Collateral Reports Completed:  Not Applicable    Plan: (Homework, other):  Patient will practice grounding and distress tolerance skills to improve anxiety management. Follow-up visit scheduled. CD Recommendations: No indications of CD issues.     Adriana Pandey, [facility], [facility] [place]    ______________________________________________________________________    Integrated Primary Care Behavioral Health Treatment Plan    Patient's Name: Philip Pfeiffer  YOB: 1990    Date of Creation: 5/13/22  Date Treatment Plan Last Reviewed/Revised: 5/13/22    DSM5 Diagnoses: Adjustment Disorders  309.28 (F43.23) With mixed anxiety and depressed mood  Psychosocial / Contextual Factors:  8 months postpartum, childbirth trauma, recent job promotion .  PROMIS (reviewed every 90 days):   Question 4/28/2022  2:30 PM CDT - Filed by Patient   In general, would you say your health is: Good   In general, would you say your quality of life is: Very good   In general, how would you rate your physical health? Good   In general, how would you rate your mental health, including your mood and your ability to think? Good   In general, how would you rate your satisfaction with your social activities and relationships? Good   In general, please rate how well you carry out your usual social activities and roles. (This includes activities at home, at work and in your community, and responsibilities as a parent, child, spouse, employee, friend, etc.) Good   To what extent are you able to carry out your everyday physical activities such as walking, climbing stairs, carrying groceries, or moving a chair? Completely   In the past 7 days    How often have you been bothered by emotional problems such as feeling anxious, depressed or irritable? Rarely   How would you rate your fatigue on average? Mild   How would you rate your pain on average?   0 = No Pain  " to  10 = Worst Imaginable Pain 0       Referral / Collaboration:  Referral to another professional/service is not indicated at this time. Patient may be interested in ART for processing childbirth trauma prior to subsequent pregnancy. Will consider timing of this referral at a later date.    Anticipated number of session for this episode of care: 6-8  Anticipation frequency of session: Every other week  Anticipated Duration of each session: 38-52 minutes  Treatment plan will be reviewed in 90 days or when goals have been changed.     MeasurableTreatment Goal(s) related to diagnosis / functional impairment(s)  Goal 1: Patient will reduce frequency in which she feels like she is \"losing control\".     I will know I've met my goal when I feel like I am better able to manage how I feel when I am at work.      Objective #A (Patient Action)    Patient will use cognitive strategies identified in therapy to challenge anxious thoughts.  Status: New - Date: 5/13/22     Intervention(s)  Therapist will teach cognitive restructuring techniques.    Objective #B  Patient will identify three distraction and diversion activities and use those activities to decrease level of anxiety  .  Status: New - Date: 5/13/22     Intervention(s)  Therapist will assign homework to practice distress tolerance, mindfulness, and relaxation techniques.    Objective #C  Patient will increase insight about potential triggers, patterns, and early signs of changing moods.  Status: New - Date: 5/13/22     Intervention(s)  Therapist will assign homework to practice a body scan.      Patient has reviewed and agreed to the above plan.      Adriana Pandey, [facility]  May 13, 2022  "

## 2022-06-22 ENCOUNTER — OFFICE VISIT (OUTPATIENT)
Dept: BEHAVIORAL HEALTH | Facility: CLINIC | Age: 32
End: 2022-06-22
Payer: COMMERCIAL

## 2022-06-22 DIAGNOSIS — F43.23 ADJUSTMENT DISORDER WITH MIXED ANXIETY AND DEPRESSED MOOD: Primary | ICD-10-CM

## 2022-06-22 PROCEDURE — 90834 PSYTX W PT 45 MINUTES: CPT

## 2022-06-22 NOTE — PROGRESS NOTES
"[facility]  June 22, 2022    Behavioral Health Clinician Progress Note    Patient Name: Philip Pfeiffer       Service Type:  Individual      Service Location:   Face to Face in Clinic     Session Start Time: 10:30 AM  Session End Time: 11:15 AM      Session Length: 38 - 52      Attendees: Patient     Service Modality:  In-person    Visit Activities (Refresh list every visit): [facility] Only    Diagnostic Assessment Date: 4/27/22  Treatment Plan Review Date: Review due 8/13/22  See Flowsheets for today's PHQ-9 and ISAEL-7 results  Previous PHQ-9:   PHQ-9 SCORE 4/23/2021 4/28/2022   PHQ-9 Total Score MyChart - 1 (Minimal depression)   PHQ-9 Total Score 1 1     Previous ISAEL-7:   ISAEL-7 SCORE 4/28/2022   Total Score 3 (minimal anxiety)   Total Score 3       MYRNA LEVEL:  No flowsheet data found.    DATA  Extended Session (60+ minutes): No  Interactive Complexity: No  Crisis: No  [facility] Patient: No    Treatment Objective(s) Addressed in This Session:  Target Behavior(s): mental health management    Depressed Mood: Identify negative self-talk and behaviors: challenge core beliefs, myths, and actions  Anxiety: will develop more effective coping skills to manage anxiety symptoms and will develop healthy cognitive patterns and beliefs    Current Stressors / Issues:  [facility] visit with patient in clinic. Patient expressed she has been doing well, though she has had some distress come up as she and her  have been arguing more recently, mostly related to parenting approaches. [facility] [place] offered suggestions for effective communication strategies and affirmed the skills patient is already using, such as taking a brief \"pause\" to ground and gather her thoughts. She noted that an argument this morning led to self-doubt about her parenting; [facility] assisted with re-framing these thoughts and described strategies to reduce the associated distress.  No safety concerns, SI, or SIB.    Progress on Treatment Objective(s) / " Homework:  Minimal progress - ACTION (Actively working towards change); Intervened by reinforcing change plan / affirming steps taken    Motivational Interviewing    MI Intervention: Expressed Empathy/Understanding, Permission to raise concern or advise, Open-ended questions, Reflections: simple and complex and Reframe     Change Talk Expressed by the Patient: Ability to change Committment to change    Provider Response to Change Talk: E - Evoked more info from patient about behavior change, A - Affirmed patient's thoughts, decisions, or attempts at behavior change, R - Reflected patient's change talk and S - Summarized patient's change talk statements    Also provided psychoeducation about behavioral health condition, symptoms, and treatment options    Care Plan review completed: No    Medication Review:  No current psychiatric medications prescribed    Medication Compliance:  NA    Changes in Health Issues:   None reported    Chemical Use Review:   Substance Use: Chemical use reviewed, no active concerns identified      Tobacco Use: No current tobacco use.      Assessment: Current Emotional / Mental Status (status of significant symptoms):  Risk status (Self / Other harm or suicidal ideation)  Patient denies a history of suicidal ideation, suicide attempts, self-injurious behavior, homicidal ideation, homicidal behavior and and other safety concerns  Patient denies current fears or concerns for personal safety.  Patient denies current or recent suicidal ideation or behaviors.  Patient denies current or recent homicidal ideation or behaviors.  Patient denies current or recent self injurious behavior or ideation.  Patient denies other safety concerns.  A safety and risk management plan has not been developed at this time, however patient was encouraged to call [facility] should there be a change in any of these risk factors.    Appearance:   Appropriate   Eye Contact:   Good   Psychomotor Behavior: Normal    Attitude:   Cooperative  Interested Friendly  Orientation:   All  Speech   Rate / Production: Normal    Volume:  Normal   Mood:    Anxious  Sad   Affect:    Appropriate  Tearful  Thought Content:  Clear   Thought Form:  Coherent  Goal Directed  Logical   Insight:    Good     Diagnoses:  1. Adjustment disorder with mixed anxiety and depressed mood       Collateral Reports Completed:  Not Applicable    Plan: (Homework, other):  Patient will use re-frame and cognitive defusion strategies to reduce distress of unwanted thoughts/doubts about her parenting. Follow-up visit scheduled. CD Recommendations: No indications of CD issues.     Adriana Pandey, [facility], [facility]    ______________________________________________________________________    Integrated Primary Care Behavioral Health Treatment Plan    Patient's Name: Philip Pfeiffer  YOB: 1990    Date of Creation: 5/13/22  Date Treatment Plan Last Reviewed/Revised: 5/13/22    DSM5 Diagnoses: Adjustment Disorders  309.28 (F43.23) With mixed anxiety and depressed mood  Psychosocial / Contextual Factors:  8 months postpartum, childbirth trauma, recent job promotion .  PROMIS (reviewed every 90 days):   Question 4/28/2022  2:30 PM CDT - Filed by Patient   In general, would you say your health is: Good   In general, would you say your quality of life is: Very good   In general, how would you rate your physical health? Good   In general, how would you rate your mental health, including your mood and your ability to think? Good   In general, how would you rate your satisfaction with your social activities and relationships? Good   In general, please rate how well you carry out your usual social activities and roles. (This includes activities at home, at work and in your community, and responsibilities as a parent, child, spouse, employee, friend, etc.) Good   To what extent are you able to carry out your everyday physical activities such as walking, climbing stairs,  "carrying groceries, or moving a chair? Completely   In the past 7 days    How often have you been bothered by emotional problems such as feeling anxious, depressed or irritable? Rarely   How would you rate your fatigue on average? Mild   How would you rate your pain on average?   0 = No Pain  to  10 = Worst Imaginable Pain 0       Referral / Collaboration:  Referral to another professional/service is not indicated at this time. Patient may be interested in ART for processing childbirth trauma prior to subsequent pregnancy. Will consider timing of this referral at a later date.    Anticipated number of session for this episode of care: 6-8  Anticipation frequency of session: Every other week  Anticipated Duration of each session: 38-52 minutes  Treatment plan will be reviewed in 90 days or when goals have been changed.     MeasurableTreatment Goal(s) related to diagnosis / functional impairment(s)  Goal 1: Patient will reduce frequency in which she feels like she is \"losing control\".     I will know I've met my goal when I feel like I am better able to manage how I feel when I am at work.      Objective #A (Patient Action)    Patient will use cognitive strategies identified in therapy to challenge anxious thoughts.  Status: New - Date: 5/13/22     Intervention(s)  Therapist will teach cognitive restructuring techniques.    Objective #B  Patient will identify three distraction and diversion activities and use those activities to decrease level of anxiety  .  Status: New - Date: 5/13/22     Intervention(s)  Therapist will assign homework to practice distress tolerance, mindfulness, and relaxation techniques.    Objective #C  Patient will increase insight about potential triggers, patterns, and early signs of changing moods.  Status: New - Date: 5/13/22     Intervention(s)  Therapist will assign homework to practice a body scan.      Patient has reviewed and agreed to the above plan.      Leticia Vieyra, [facility] May 13, " 2022

## 2022-07-08 ENCOUNTER — OFFICE VISIT (OUTPATIENT)
Dept: BEHAVIORAL HEALTH | Facility: CLINIC | Age: 32
End: 2022-07-08
Payer: COMMERCIAL

## 2022-07-08 DIAGNOSIS — F43.23 ADJUSTMENT DISORDER WITH MIXED ANXIETY AND DEPRESSED MOOD: Primary | ICD-10-CM

## 2022-07-08 PROCEDURE — 90832 PSYTX W PT 30 MINUTES: CPT

## 2022-07-08 NOTE — PROGRESS NOTES
[facility]  July 8, 2022    Behavioral Health Clinician Progress Note    Patient Name: Philip Pfeiffer       Service Type:  Individual      Service Location:   Face to Face in Clinic     Session Start Time: 3:02 PM  Session End Time: 3:25 PM      Session Length: 16 - 37      Attendees: Patient     Service Modality:  In-person    Visit Activities (Refresh list every visit): [facility] Only    Diagnostic Assessment Date: 4/27/22  Treatment Plan Review Date: Review due 8/13/22  See Flowsheets for today's PHQ-9 and ISAEL-7 results  Previous PHQ-9:   PHQ-9 SCORE 4/23/2021 4/28/2022   PHQ-9 Total Score MyChart - 1 (Minimal depression)   PHQ-9 Total Score 1 1     Previous ISAEL-7:   ISAEL-7 SCORE 4/28/2022   Total Score 3 (minimal anxiety)   Total Score 3       MYRNA LEVEL:  No flowsheet data found.    DATA  Extended Session (60+ minutes): No  Interactive Complexity: No  Crisis: No  [facility] Patient: No    Treatment Objective(s) Addressed in This Session:  Target Behavior(s): mental health management    Adjustment Difficulties: will use coping/problem-solving skills to facilitate adaptive adjustment    Current Stressors / Issues:  [facility] visit with patient in clinic. Patient reported that she is doing very well and has noticed a significant reduction in emotional distress. She stated that she is not experiencing crying spells and feels hopeful that she will continue to make progress, while understanding that difficult days may still come up. Patient shared that she and her  are tentatively planning to try for another baby next summer, and she intends to seek support to further process her birth experience around that time. [facility] affirmed patient's insight and proactive approach to promote emotional wellbeing, and validated patient's perspective that she does not need to engage in trauma-focused work at this time.     Progress on Treatment Objective(s) / Homework:  Satisfactory progress - ACTION (Actively working  towards change); Intervened by reinforcing change plan / affirming steps taken    Motivational Interviewing    MI Intervention: Supported Autonomy, Collaboration, Evocation, Open-ended questions and Reflections: simple and complex     Change Talk Expressed by the Patient: Desire to change Ability to change Reasons to change Committment to change    Provider Response to Change Talk: E - Evoked more info from patient about behavior change, A - Affirmed patient's thoughts, decisions, or attempts at behavior change, R - Reflected patient's change talk and S - Summarized patient's change talk statements    Also provided psychoeducation about behavioral health condition, symptoms, and treatment options    Care Plan review completed: No    Medication Review:  No current psychiatric medications prescribed    Medication Compliance:  NA    Changes in Health Issues:  None reported  Stopped pumping, now exclusively formula feeding    Chemical Use Review:   Substance Use: Chemical use reviewed, no active concerns identified      Tobacco Use: No current tobacco use.      Assessment: Current Emotional / Mental Status (status of significant symptoms):  Risk status (Self / Other harm or suicidal ideation)  Patient denies a history of suicidal ideation, suicide attempts, self-injurious behavior, homicidal ideation, homicidal behavior and and other safety concerns  Patient denies current fears or concerns for personal safety.  Patient denies current or recent suicidal ideation or behaviors.  Patient denies current or recent homicidal ideation or behaviors.  Patient denies current or recent self injurious behavior or ideation.  Patient denies other safety concerns.  A safety and risk management plan has not been developed at this time, however patient was encouraged to call [facility] should there be a change in any of these risk factors.    Appearance:   Appropriate   Eye Contact:   Good   Psychomotor Behavior: Normal    Attitude:   Cooperative  Interested Friendly Pleasant  Orientation:   All  Speech   Rate / Production: Normal    Volume:  Normal   Mood:    Normal Euthymic  Affect:    Appropriate   Thought Content:  Clear   Thought Form:  Coherent  Goal Directed  Logical   Insight:    Good  and Intellectual Insight    Diagnoses:  1. Adjustment disorder with mixed anxiety and depressed mood    Updated diagnostic assessment may be warranted at next visit per change in symptom presentation    Collateral Reports Completed:  Not Applicable    Plan: (Homework, other):  Patient will continue to use adaptive coping strategies and effective communication skills. Follow-up visit scheduled in five weeks for mood/anxiety check. CD Recommendations: No indications of CD issues.     Adriana Pandey, [facility], [facility]    ______________________________________________________________________    Integrated Primary Care Behavioral Health Treatment Plan    Patient's Name: Philip Pfeiffer  YOB: 1990    Date of Creation: 5/13/22  Date Treatment Plan Last Reviewed/Revised: 5/13/22    DSM5 Diagnoses: Adjustment Disorders  309.28 (F43.23) With mixed anxiety and depressed mood  Psychosocial / Contextual Factors:  8 months postpartum, childbirth trauma, recent job promotion .  PROMIS (reviewed every 90 days):   Question 4/28/2022  2:30 PM CDT - Filed by Patient   In general, would you say your health is: Good   In general, would you say your quality of life is: Very good   In general, how would you rate your physical health? Good   In general, how would you rate your mental health, including your mood and your ability to think? Good   In general, how would you rate your satisfaction with your social activities and relationships? Good   In general, please rate how well you carry out your usual social activities and roles. (This includes activities at home, at work and in your community, and responsibilities as a parent, child, spouse, employee, friend,  "etc.) Good   To what extent are you able to carry out your everyday physical activities such as walking, climbing stairs, carrying groceries, or moving a chair? Completely   In the past 7 days    How often have you been bothered by emotional problems such as feeling anxious, depressed or irritable? Rarely   How would you rate your fatigue on average? Mild   How would you rate your pain on average?   0 = No Pain  to  10 = Worst Imaginable Pain 0       Referral / Collaboration:  Referral to another professional/service is not indicated at this time. Patient may be interested in ART for processing childbirth trauma prior to subsequent pregnancy. Will consider timing of this referral at a later date.    Anticipated number of session for this episode of care: 6-8  Anticipation frequency of session: Every other week  Anticipated Duration of each session: 38-52 minutes  Treatment plan will be reviewed in 90 days or when goals have been changed.     MeasurableTreatment Goal(s) related to diagnosis / functional impairment(s)  Goal 1: Patient will reduce frequency in which she feels like she is \"losing control\".     I will know I've met my goal when I feel like I am better able to manage how I feel when I am at work.      Objective #A (Patient Action)    Patient will use cognitive strategies identified in therapy to challenge anxious thoughts.  Status: New - Date: 5/13/22     Intervention(s)  Therapist will teach cognitive restructuring techniques.    Objective #B  Patient will identify three distraction and diversion activities and use those activities to decrease level of anxiety  .  Status: New - Date: 5/13/22     Intervention(s)  Therapist will assign homework to practice distress tolerance, mindfulness, and relaxation techniques.    Objective #C  Patient will increase insight about potential triggers, patterns, and early signs of changing moods.  Status: New - Date: 5/13/22     Intervention(s)  Therapist will assign " homework to practice a body scan.      Patient has reviewed and agreed to the above plan.      Leticia Vieyra, [facility] May 13, 2022

## 2022-08-27 ENCOUNTER — RX ONLY (RX ONLY)
Age: 32
End: 2022-08-27

## 2022-08-27 RX ORDER — CEPHALEXIN 500 MG/1
CAPSULE ORAL
Qty: 14 | Refills: 0 | Status: ERX | COMMUNITY
Start: 2022-08-27

## 2022-09-11 ENCOUNTER — HEALTH MAINTENANCE LETTER (OUTPATIENT)
Age: 32
End: 2022-09-11

## 2022-11-10 ENCOUNTER — OFFICE VISIT (OUTPATIENT)
Dept: MIDWIFE SERVICES | Facility: CLINIC | Age: 32
End: 2022-11-10
Payer: COMMERCIAL

## 2022-11-10 VITALS
BODY MASS INDEX: 27.94 KG/M2 | WEIGHT: 173 LBS | SYSTOLIC BLOOD PRESSURE: 134 MMHG | OXYGEN SATURATION: 99 % | HEART RATE: 101 BPM | DIASTOLIC BLOOD PRESSURE: 78 MMHG

## 2022-11-10 DIAGNOSIS — Z30.46 ENCOUNTER FOR NEXPLANON REMOVAL: Primary | ICD-10-CM

## 2022-11-10 PROCEDURE — 11982 REMOVE DRUG IMPLANT DEVICE: CPT | Performed by: ADVANCED PRACTICE MIDWIFE

## 2022-11-10 NOTE — PROGRESS NOTES
Nexplanon Removal:     Is a pregnancy test required: No.  Was a consent obtained?  Yes    Philip Pfeiffer is here for removal of etonogestrel implant Nexplanon/Implanon    Indication: She would like to conceive      Preoperative Diagnosis: etonogestrel implant  Postoperative Diagnosis: etonogestrel implant removed    Technique: On the right arm  Skin prep Betadine  Anesthesia 1% lidocaine  Procedure: Small incision (<5mm) was made at distal end of palpable implant, curved hemostat or mosquito forceps was used to isolate the implant and bring it to the incision, the fibrous capsule containing the implant  was incised and the Implant was removed intact.      EBL: minimal  Complications:  No  Tolerance:  Pt tolerated procedure well and was in stable condition.   Dressing:    A pressure bandage was placed for the next 12-24 hours.    Contraception was discussed and patient chose the following method condoms until January when she plans to start trying to conceive      Follow up: Pt was instructed to call if bleeding, severe pain or foul smell.     Nissa Beltre CNM, [facility]

## 2022-12-14 ENCOUNTER — IMMUNIZATION (OUTPATIENT)
Dept: FAMILY MEDICINE | Facility: CLINIC | Age: 32
End: 2022-12-14
Payer: COMMERCIAL

## 2022-12-14 DIAGNOSIS — Z23 HIGH PRIORITY FOR 2019-NCOV VACCINE: Primary | ICD-10-CM

## 2022-12-14 DIAGNOSIS — Z23 NEED FOR PROPHYLACTIC VACCINATION AND INOCULATION AGAINST INFLUENZA: ICD-10-CM

## 2022-12-14 PROCEDURE — 90471 IMMUNIZATION ADMIN: CPT | Performed by: STUDENT IN AN ORGANIZED HEALTH CARE EDUCATION/TRAINING PROGRAM

## 2022-12-14 PROCEDURE — 0124A COVID-19 VACCINE BIVALENT BOOSTER 12+ (PFIZER): CPT | Performed by: STUDENT IN AN ORGANIZED HEALTH CARE EDUCATION/TRAINING PROGRAM

## 2022-12-14 PROCEDURE — 90686 IIV4 VACC NO PRSV 0.5 ML IM: CPT | Performed by: STUDENT IN AN ORGANIZED HEALTH CARE EDUCATION/TRAINING PROGRAM

## 2022-12-14 PROCEDURE — 91312 COVID-19 VACCINE BIVALENT BOOSTER 12+ (PFIZER): CPT | Performed by: STUDENT IN AN ORGANIZED HEALTH CARE EDUCATION/TRAINING PROGRAM

## 2023-01-22 ENCOUNTER — HEALTH MAINTENANCE LETTER (OUTPATIENT)
Age: 33
End: 2023-01-22

## 2023-02-14 ENCOUNTER — E-VISIT (OUTPATIENT)
Dept: FAMILY MEDICINE | Facility: CLINIC | Age: 33
End: 2023-02-14
Payer: COMMERCIAL

## 2023-02-14 DIAGNOSIS — B30.9 VIRAL CONJUNCTIVITIS: Primary | ICD-10-CM

## 2023-02-14 PROCEDURE — 99421 OL DIG E/M SVC 5-10 MIN: CPT | Performed by: PHYSICIAN ASSISTANT

## 2023-02-14 NOTE — PATIENT INSTRUCTIONS
Thank you for choosing us for your care. Based on your symptoms and length of illness, I do not think that you need a prescription at this time.  Your symptoms are likely related to viral pink eye rather than bacterial. Please follow the care advise I ve provided and use the over the counter medications to help relieve your symptoms. View your full visit summary for details by clicking on the link below.     If you re not feeling better within 2-3 days, please respond to this message and we can consider if a prescription is needed.  You can schedule an appointment right here in [facility], or call [phone]  If the visit is for the same symptoms as your eVisit, we ll refund the cost of your eVisit if seen within seven days.    Thank you for choosing us for your care. Based on your symptoms and length of illness, I do not think that you need an antibiotic prescription at this time.  Please follow the care advise I've provided and use the prescribed medication to help relieve your symptoms. View your full visit summary for details by clicking on the link below.     If you're not feeling better within  2-3days, please respond to this message and we can consider if an antibiotic prescription is needed.  You can schedule an appointment right here in [facility], or call [phone]  If the visit is for the same symptoms as your eVisit, we'll refund the cost of your eVisit if seen within seven days     Conjunctivitis, Nonspecific    The membrane that covers the white part of your eye (the conjunctiva) is inflamed. Inflammation happens when your body responds to an injury, allergic reaction, infection, or illness. Symptoms of inflammation in the eye may include redness, irritation, itching, swelling, or burning. These symptoms should go away within the next 24 hours. Conjunctivitis may be related to a particle that was in your eye. If so, it may wash out with your tears or irrigation treatment. Being exposed to liquid  chemicals or fumes may also cause this reaction.    Home care  Put a cold pack on the eye for 20 minutes at a time. This will reduce pain. To make a cold pack, put ice cubes in a plastic bag that seals at the top. Wrap the bag in a clean, thin towel or cloth.  Artificial tears may be prescribed to reduce irritation or redness. These should be used 3 to 4 times a day.  You may use acetaminophen or ibuprofen to control pain, unless another medicine was prescribed. If you have chronic liver or kidney disease, talk with your healthcare provider before using these medicines. Also talk with your provider if you have ever had a stomach ulcer or gastrointestinal bleeding.    Follow-up care  Follow up with your healthcare provider, or as advised.  When to seek medical advice  Call your healthcare provider right away if any of these occur:  Eyelid swells more  Eye pain gets worse  Redness or drainage from the eye gets worse  Blurry vision gets worse or you have increased sensitivity to light  Normal vision does not return within 24 to 48 hours  [employer] last reviewed this educational content on 2/1/2020 2000-2021 [employer]. All rights reserved. This information is not intended as a substitute for professional medical care. Always follow your healthcare professional's instructions.

## 2023-04-19 ENCOUNTER — TRANSCRIBE ORDERS (OUTPATIENT)
Dept: MATERNAL FETAL MEDICINE | Facility: CLINIC | Age: 33
End: 2023-04-19

## 2023-04-19 ENCOUNTER — PRENATAL OFFICE VISIT (OUTPATIENT)
Dept: NURSING | Facility: CLINIC | Age: 33
End: 2023-04-19
Payer: COMMERCIAL

## 2023-04-19 VITALS — BODY MASS INDEX: 27.5 KG/M2 | HEIGHT: 67 IN

## 2023-04-19 DIAGNOSIS — Z34.90 ENCOUNTER FOR SUPERVISION OF NORMAL PREGNANCY: Primary | ICD-10-CM

## 2023-04-19 DIAGNOSIS — O26.90 PREGNANCY RELATED CONDITION, ANTEPARTUM: Primary | ICD-10-CM

## 2023-04-19 PROCEDURE — 99207 PR NO CHARGE NURSE ONLY: CPT

## 2023-04-19 NOTE — PROGRESS NOTES
Important Information for Provider:     New ob nurse intake by phone, second pregnancy.  Patient had emergency C section 8/09/2021, postdates. History of abnormal 1 hour GCT, passed 3 hour GTT. Handouts reviewed. Ordered genetic screening. A1C ordered with NOB labs. Ultrasound and NOB with CNM 5/03/2023    Caffeine intake/servings daily - 0  Calcium intake/servings daily - 3  Exercise 5 times weekly - describe ; walks, yoga, precautions given  Sunscreen used - Yes  Seatbelts used - Yes  Guns stored in the home - No  Self Breast Exam - Yes  Pap test up to date -  Yes  Dental exam up to date -  Yes  Immunizations reviewed and up to date - Yes  Abuse: Current or Past (Physical, Sexual or Emotional) - No  Do you feel safe in your environment - Yes  Do you cope well with stress - Yes    Prenatal OB Questionnaire  Patient supplied answers from flow sheet for:  Prenatal OB Questionnaire.  Past Medical History  Have you ever recieved care for your mental health? : No  Have you ever been in a major accident or suffered serious trauma?: No  Within the last year, has anyone hit, slapped, kicked or otherwise hurt you?: No  In the last year, has anyone forced you to have sex when you didn't want to?: No    Past Medical History 2   Have you ever received a blood transfusion?: No  Would you accept a blood transfusion if was medically recommended?: Yes  Does anyone in your home smoke?: No   Is your blood type Rh negative?: (!) Yes  Have you ever ?: No  Have you been hospitalized for a nonsurgical reason excluding normal delivery?: No  Have you ever had an abnormal pap smear?: No    Past Medical History (Continued)  Do you have a history of abnormalities of the uterus?: No  Did your mother take DARSHANA or any other hormones when she was pregnant with you?: No  Do you have any other problems we have not asked about which you feel may be important to this pregnancy?: No                     Allergies as of 4/19/2023:    Allergies  as of 04/19/2023 - Reviewed 02/14/2023   Allergen Reaction Noted     Vancomycin Hives and Itching 04/24/2006       Current medications are:  Current Outpatient Medications   Medication Sig Dispense Refill     Prenatal Vit-Fe Fumarate-FA (PRENATAL VITAMIN PO)            Early ultrasound screening tool:    Does patient have irregular periods?  No  Did patient use hormonal birth control in the three months prior to positive urine pregnancy test? No  Is the patient breastfeeding?  No  Is the patient 10 weeks or greater at time of education visit?  No

## 2023-05-03 ENCOUNTER — ANCILLARY PROCEDURE (OUTPATIENT)
Dept: ULTRASOUND IMAGING | Facility: CLINIC | Age: 33
End: 2023-05-03
Payer: COMMERCIAL

## 2023-05-03 ENCOUNTER — PRENATAL OFFICE VISIT (OUTPATIENT)
Dept: MIDWIFE SERVICES | Facility: CLINIC | Age: 33
End: 2023-05-03
Payer: COMMERCIAL

## 2023-05-03 VITALS
WEIGHT: 166 LBS | DIASTOLIC BLOOD PRESSURE: 80 MMHG | SYSTOLIC BLOOD PRESSURE: 107 MMHG | BODY MASS INDEX: 26.39 KG/M2 | TEMPERATURE: 97.9 F | HEART RATE: 87 BPM

## 2023-05-03 DIAGNOSIS — Z34.90 ENCOUNTER FOR SUPERVISION OF NORMAL PREGNANCY: ICD-10-CM

## 2023-05-03 DIAGNOSIS — Z34.81 ENCOUNTER FOR SUPERVISION OF OTHER NORMAL PREGNANCY, FIRST TRIMESTER: Primary | ICD-10-CM

## 2023-05-03 PROCEDURE — 99207 PR FIRST OB VISIT: CPT | Performed by: ADVANCED PRACTICE MIDWIFE

## 2023-05-03 PROCEDURE — 76801 OB US < 14 WKS SINGLE FETUS: CPT | Performed by: OBSTETRICS & GYNECOLOGY

## 2023-05-03 ASSESSMENT — ANXIETY QUESTIONNAIRES
1. FEELING NERVOUS, ANXIOUS, OR ON EDGE: NOT AT ALL
3. WORRYING TOO MUCH ABOUT DIFFERENT THINGS: NOT AT ALL
IF YOU CHECKED OFF ANY PROBLEMS ON THIS QUESTIONNAIRE, HOW DIFFICULT HAVE THESE PROBLEMS MADE IT FOR YOU TO DO YOUR WORK, TAKE CARE OF THINGS AT HOME, OR GET ALONG WITH OTHER PEOPLE: NOT DIFFICULT AT ALL
GAD7 TOTAL SCORE: 0
GAD7 TOTAL SCORE: 0
2. NOT BEING ABLE TO STOP OR CONTROL WORRYING: NOT AT ALL
7. FEELING AFRAID AS IF SOMETHING AWFUL MIGHT HAPPEN: NOT AT ALL
5. BEING SO RESTLESS THAT IT IS HARD TO SIT STILL: NOT AT ALL
6. BECOMING EASILY ANNOYED OR IRRITABLE: NOT AT ALL

## 2023-05-03 ASSESSMENT — PATIENT HEALTH QUESTIONNAIRE - PHQ9
5. POOR APPETITE OR OVEREATING: NOT AT ALL
SUM OF ALL RESPONSES TO PHQ QUESTIONS 1-9: 2

## 2023-05-03 NOTE — PATIENT INSTRUCTIONS
For treatment of nausea in pregnancy, I recommend 25mg B6 in the morning and at night and 1/2 tab unisom (doxylamine) at night.

## 2023-05-03 NOTE — PROGRESS NOTES
8w2d  Feeling well. Here with her daughter Ann Marie. Happy to be pregnant. Notes that she is nauseated much of the day. Discussed B6 and doxylamine. Thinks she wants to TOLAC. Encouraged a visit with our OBGyn team.     Philip Pfeiffer is a 32 year old female    Pt presents to clinic today for a NOB visit.  Patient's last menstrual period was 2023.. Estimated Date of Delivery: Dec 11, 2023 is calculated from lmp and verified with U/S     She has not had bleeding since her LMP.   She has had mild nausea. Weight loss has not occurred.   This was a planned pregnancy.   FOB is involved,  Luke     OTHER CONCERNS: none    Pt's hx of HSV is negative    ============================================  PERSONAL/SOCIAL HISTORY  Lives lives with their family.  Exercises sporadic or irregular exercise  Pt denies abuse and feels safe in her home and relationships.     REVIEW OF SYSTEMS  C: NEGATIVE for fever, chills, change in weight  I: NEGATIVE for worrisome rashes, moles or lesions  E/M: NEGATIVE for ear, mouth and throat problems  R: NEGATIVE for significant cough or SOB  CV: NEGATIVE for chest pain, palpitations or peripheral edema  GI: NEGATIVE for nausea, abdominal pain, heartburn, or change in bowel habits  : NEGATIVE for frequency, dysuria, or hematuria  PSYCHIATRIC:  NEGATIVE for depression, anxiety or other mental health concerns    PHYSICAL EXAM:  /80   Pulse 87   Temp 97.9  F (36.6  C) (Temporal)   Wt 75.3 kg (166 lb)   LMP 2023   Breastfeeding No   BMI 26.39 kg/m    BMI- Body mass index is 26.39 kg/m ., RECOMMENDED WEIGHT GAIN: 25-35 lbs.  GENERAL:  Pleasant pregnant female, alert, cooperative and well groomed.  SKIN:  Warm and dry, without lesions or rashes  HEAD: Symmetrical features.  MOUTH:  Buccal mucosa pink, moist without lesions.  Teeth in good repair.    NECK:  Thyroid without enlargement and nodules.  Lymph nodes not palpable.   LUNGS:  Clear to auscultation.      HEART:  RRR  without murmur.  ABDOMEN: Soft without masses , tenderness or organomegaly.  No CVA tenderness.  Uterus palpable at size equal to dates.  No scars noted..  MUSCULOSKELETAL:  Full range of motion  EXTREMITIES:  No edema. No significant varicosities.     PELVIC EXAM:deferred       GC/CHLAMYDIA CULTURE OBTAINED:PATIENT DECLINED    ASSESSMENT:  Intrauterine pregnancy at 8w2d weeks gestation.     No diagnosis found.    PLAN:  Oriented to CNM service.    Instructed on use of triage nurse line and contacting the on call CNM after hours for an urgent need such as fever, vagina bleeding, bladder or vaginal infection, rupture of membranes,  or term labor.      Discussed the indications, uses for and false positives for quad screen  and fetal survey ultrasound at 18-20 weeks gestation. Will inform us at the next visit if she wished to avail herself of these screens.    Instructed on best evidence for: weight gain for her weight and height for pregnancy; healthy diet; exercise and activity during pregnancy; and maintenance of a generally healthy lifestyle.     Discussed the harms, benefits, side effects and alternative therapies for current prescribed and OTC medications.    Nissa Beltre, CNM, APRN CNM

## 2023-05-30 LAB
ABO/RH(D): NORMAL
ANTIBODY SCREEN: NEGATIVE
SPECIMEN EXPIRATION DATE: NORMAL

## 2023-05-31 ENCOUNTER — PRENATAL OFFICE VISIT (OUTPATIENT)
Dept: MIDWIFE SERVICES | Facility: CLINIC | Age: 33
End: 2023-05-31
Payer: COMMERCIAL

## 2023-05-31 VITALS
BODY MASS INDEX: 26.55 KG/M2 | WEIGHT: 167 LBS | OXYGEN SATURATION: 99 % | HEART RATE: 99 BPM | DIASTOLIC BLOOD PRESSURE: 75 MMHG | SYSTOLIC BLOOD PRESSURE: 130 MMHG

## 2023-05-31 DIAGNOSIS — Z34.81 ENCOUNTER FOR SUPERVISION OF OTHER NORMAL PREGNANCY IN FIRST TRIMESTER: Primary | ICD-10-CM

## 2023-05-31 DIAGNOSIS — O23.599 BACTERIAL VAGINOSIS IN PREGNANCY: ICD-10-CM

## 2023-05-31 DIAGNOSIS — B96.89 BACTERIAL VAGINOSIS IN PREGNANCY: ICD-10-CM

## 2023-05-31 DIAGNOSIS — R10.2 PELVIC CRAMPING: ICD-10-CM

## 2023-05-31 LAB
ALBUMIN UR-MCNC: NEGATIVE MG/DL
APPEARANCE UR: CLEAR
BILIRUB UR QL STRIP: NEGATIVE
C TRACH DNA SPEC QL NAA+PROBE: NEGATIVE
CLUE CELLS: PRESENT
COLOR UR AUTO: YELLOW
ERYTHROCYTE [DISTWIDTH] IN BLOOD BY AUTOMATED COUNT: 12.5 % (ref 10–15)
GLUCOSE UR STRIP-MCNC: NEGATIVE MG/DL
HBV SURFACE AG SERPL QL IA: NONREACTIVE
HCT VFR BLD AUTO: 36.4 % (ref 35–47)
HCV AB SERPL QL IA: NONREACTIVE
HGB BLD-MCNC: 12.6 G/DL (ref 11.7–15.7)
HGB UR QL STRIP: NEGATIVE
HIV 1+2 AB+HIV1 P24 AG SERPL QL IA: NONREACTIVE
KETONES UR STRIP-MCNC: NEGATIVE MG/DL
LEUKOCYTE ESTERASE UR QL STRIP: ABNORMAL
MCH RBC QN AUTO: 29.6 PG (ref 26.5–33)
MCHC RBC AUTO-ENTMCNC: 34.6 G/DL (ref 31.5–36.5)
MCV RBC AUTO: 86 FL (ref 78–100)
N GONORRHOEA DNA SPEC QL NAA+PROBE: NEGATIVE
NITRATE UR QL: NEGATIVE
PH UR STRIP: 5.5 [PH] (ref 5–7)
PLATELET # BLD AUTO: 249 10E3/UL (ref 150–450)
RBC # BLD AUTO: 4.25 10E6/UL (ref 3.8–5.2)
SP GR UR STRIP: 1.01 (ref 1–1.03)
TRICHOMONAS, WET PREP: ABNORMAL
UROBILINOGEN UR STRIP-ACNC: 0.2 E.U./DL
WBC # BLD AUTO: 6.7 10E3/UL (ref 4–11)
WBC'S/HIGH POWER FIELD, WET PREP: ABNORMAL
YEAST, WET PREP: ABNORMAL

## 2023-05-31 PROCEDURE — 87340 HEPATITIS B SURFACE AG IA: CPT | Performed by: ADVANCED PRACTICE MIDWIFE

## 2023-05-31 PROCEDURE — 99207 PR PRENATAL VISIT: CPT | Performed by: ADVANCED PRACTICE MIDWIFE

## 2023-05-31 PROCEDURE — 87210 SMEAR WET MOUNT SALINE/INK: CPT | Performed by: ADVANCED PRACTICE MIDWIFE

## 2023-05-31 PROCEDURE — 86762 RUBELLA ANTIBODY: CPT | Performed by: ADVANCED PRACTICE MIDWIFE

## 2023-05-31 PROCEDURE — 86901 BLOOD TYPING SEROLOGIC RH(D): CPT | Performed by: ADVANCED PRACTICE MIDWIFE

## 2023-05-31 PROCEDURE — 86900 BLOOD TYPING SEROLOGIC ABO: CPT | Performed by: ADVANCED PRACTICE MIDWIFE

## 2023-05-31 PROCEDURE — 87389 HIV-1 AG W/HIV-1&-2 AB AG IA: CPT | Performed by: ADVANCED PRACTICE MIDWIFE

## 2023-05-31 PROCEDURE — 87491 CHLMYD TRACH DNA AMP PROBE: CPT | Performed by: ADVANCED PRACTICE MIDWIFE

## 2023-05-31 PROCEDURE — 36415 COLL VENOUS BLD VENIPUNCTURE: CPT | Performed by: ADVANCED PRACTICE MIDWIFE

## 2023-05-31 PROCEDURE — 87086 URINE CULTURE/COLONY COUNT: CPT | Performed by: ADVANCED PRACTICE MIDWIFE

## 2023-05-31 PROCEDURE — 86803 HEPATITIS C AB TEST: CPT | Performed by: ADVANCED PRACTICE MIDWIFE

## 2023-05-31 PROCEDURE — 86850 RBC ANTIBODY SCREEN: CPT | Performed by: ADVANCED PRACTICE MIDWIFE

## 2023-05-31 PROCEDURE — 81003 URINALYSIS AUTO W/O SCOPE: CPT | Performed by: ADVANCED PRACTICE MIDWIFE

## 2023-05-31 PROCEDURE — 87591 N.GONORRHOEAE DNA AMP PROB: CPT | Performed by: ADVANCED PRACTICE MIDWIFE

## 2023-05-31 PROCEDURE — 85027 COMPLETE CBC AUTOMATED: CPT | Performed by: ADVANCED PRACTICE MIDWIFE

## 2023-05-31 PROCEDURE — 86780 TREPONEMA PALLIDUM: CPT | Performed by: ADVANCED PRACTICE MIDWIFE

## 2023-05-31 RX ORDER — METRONIDAZOLE 500 MG/1
500 TABLET ORAL 2 TIMES DAILY
Qty: 14 TABLET | Refills: 0 | Status: SHIPPED | OUTPATIENT
Start: 2023-05-31 | End: 2023-06-07

## 2023-05-31 NOTE — PROGRESS NOTES
12w2d  Philip is here with her daughter today. She has been feeling on and off mild pelvic cramping for last 2 weeks. She woke up with cramping 2 weeks ago and then has been feeling it on and off. Denies bleeding, change in vaginal discharge, dysuria. Wet prep and GC/CT today. Wet prep shows clue cells indicating BV. Rx for metronidazole 500mg BID x 7d. Still feeling occasional nausea but it is overall better than earlier in pregnancy. NOB labs today. Has genetic testing scheduled. RTC in 1 month.    AGUSTIN Duffy CNM

## 2023-06-01 LAB
BACTERIA UR CULT: NO GROWTH
RUBV IGG SERPL QL IA: 3.8 INDEX
RUBV IGG SERPL QL IA: POSITIVE
T PALLIDUM AB SER QL: NONREACTIVE

## 2023-06-01 NOTE — RESULT ENCOUNTER NOTE
Dear Philip,    Your test results are attached below. All results were normal and reassuring. If you have any questions, please contact me via [employer] or you can call our office at [phone].    Phill,  Nissa Beltre CNM, APRN BALJEET, CNM

## 2023-06-05 ENCOUNTER — APPOINTMENT (OUTPATIENT)
Dept: URBAN - METROPOLITAN AREA CLINIC 260 | Age: 33
Setting detail: DERMATOLOGY
End: 2023-06-05

## 2023-06-05 ENCOUNTER — PRE VISIT (OUTPATIENT)
Dept: MATERNAL FETAL MEDICINE | Facility: CLINIC | Age: 33
End: 2023-06-05
Payer: COMMERCIAL

## 2023-06-07 ENCOUNTER — LAB (OUTPATIENT)
Dept: LAB | Facility: CLINIC | Age: 33
End: 2023-06-07
Attending: ADVANCED PRACTICE MIDWIFE
Payer: COMMERCIAL

## 2023-06-07 ENCOUNTER — OFFICE VISIT (OUTPATIENT)
Dept: MATERNAL FETAL MEDICINE | Facility: CLINIC | Age: 33
End: 2023-06-07
Attending: ADVANCED PRACTICE MIDWIFE
Payer: COMMERCIAL

## 2023-06-07 ENCOUNTER — MEDICAL CORRESPONDENCE (OUTPATIENT)
Dept: HEALTH INFORMATION MANAGEMENT | Facility: CLINIC | Age: 33
End: 2023-06-07

## 2023-06-07 ENCOUNTER — HOSPITAL ENCOUNTER (OUTPATIENT)
Dept: ULTRASOUND IMAGING | Facility: CLINIC | Age: 33
Discharge: HOME | End: 2023-06-07
Attending: ADVANCED PRACTICE MIDWIFE
Payer: COMMERCIAL

## 2023-06-07 DIAGNOSIS — Z36.9 PRENATAL SCREENING ENCOUNTER: Primary | ICD-10-CM

## 2023-06-07 DIAGNOSIS — Z36.82 ENCOUNTER FOR (NT) NUCHAL TRANSLUCENCY SCAN: Primary | ICD-10-CM

## 2023-06-07 DIAGNOSIS — O26.90 PREGNANCY RELATED CONDITION, ANTEPARTUM: ICD-10-CM

## 2023-06-07 DIAGNOSIS — Z36.9 PRENATAL SCREENING ENCOUNTER: ICD-10-CM

## 2023-06-07 PROCEDURE — 76813 OB US NUCHAL MEAS 1 GEST: CPT

## 2023-06-07 PROCEDURE — 96040 HC GENETIC COUNSELING, EACH 30 MINUTES: CPT | Performed by: GENETIC COUNSELOR, MS

## 2023-06-07 PROCEDURE — 36415 COLL VENOUS BLD VENIPUNCTURE: CPT

## 2023-06-07 PROCEDURE — 76813 OB US NUCHAL MEAS 1 GEST: CPT | Mod: 26 | Performed by: OBSTETRICS & GYNECOLOGY

## 2023-06-07 NOTE — PROGRESS NOTES
[facility]etal [facility]  Genetic Counseling Consult    Patient:  Philip Pfeiffer YOB: 1990   Date of Service:  23   MRN: [id number]    Philip was seen at the [facility] [place] for genetic consultation. The indication for genetic counseling is desire to discuss options for genetic screening and diagnostics. The patient was accompanied to this visit by their daughter.    The session was conducted in English.      IMPRESSION/ PLAN   1. Philip has not had genetic screening in this pregnancy but elected to have screening today.     2. During today's [facility] visit, Philip had a blood draw for NIPS through [facility]. The NIPS screens for trisomy 21, 18, and 13 and the patient opted to screen for sex chromosome aneuploidies, but declines reporting of fetal sex. Results are expected in 7-10 days. The patient will be called with results and if they do not answer they requested a detailed message with results on their voicemail.  Patient was informed that results will be available in [employer]. They are aware if the result is abnormal for a sex chromosome aneuploidy, disclosure of that result will reveal the predicted sex, even though they choose to not report fetal sex (male or female) on the report.     3. Philip had a nuchal translucency ultrasound today. Please see the ultrasound report for further details.    4. Further recommendations include a fetal anatomy ultrasound around 18-20 weeks, which will most likely be completed with their primary OB provider, and maternal serum AFP only screening for neural tube defects, if desired (quad screen should NOT be performed).    PREGNANCY HISTORY   /Parity:       Philip's pregnancy history is significant for one full term delivery complicated by placental abruption and emergency , healthy daughter    CURRENT PREGNANCY   Current Age: 32 year old   Age at Delivery: 33 year old  SHAHRZAD:  "2023, by Last Menstrual Period                                   Gestational Age: 13w2d  This pregnancy is a single gestation.   This pregnancy was conceived spontaneously.    MEDICAL HISTORY   Philip s reported medical history is not expected to impact pregnancy management or risks to fetal development.       FAMILY HISTORY   A three-generation pedigree was obtained today and is scanned under the \"Media\" tab in Epic. The family history was reported by Philip.    The following significant findings were reported today:     The father of the pregnancy, Luke, 33, is healthy       Luke's sister is a known carrier of cystic fibrosis.  Cystic fibrosis (CF) is a genetic condition caused by mutations in the CFTR gene. CF is the most common deadly inherited condition among Caucasians in the [place]. Approximately 1 in 25 individuals with a  ethnic background are carriers for CF. The condition is characterized by thick mucus in the lungs and digestive system. CF is a chronic condition and affected individuals experience breathing problems and lung infections that worsen over time. Affected individuals often also have pancreatic insufficiency. In men infertility and delayed puberty is common. Severity of symptoms vary for individuals with most cases being diagnosed during childhood or through [facility] programs. CF is treated based on symptoms and aims to help with respiratory functions as well as enzymes used to help pancreatic function to proper digestion. There is reduced life expectancy for CF but children born with CF are currently living longer than ever due to modern medicine.       Philip's sister had a type of brain cancer when she was 5 and due to the cancer spreading to an optic nerve she lost vision in one eye. There is no other family history of cancer. In the absence of more information, it is difficult to offer a more detailed risk assessment. However, the remaining family history is " reassuringly unremarkable and unfortunately, brain cancer is a more common childhood sporadic cancer.     Otherwise, the reported family history is unremarkable for multiple miscarriages, stillbirths, birth defects, intellectual disabilities, known genetic conditions, and consanguinity.       CARRIER SCREENING   Expanded carrier screening is available to screen for autosomal recessive conditions and X-linked conditions in a large list of genes. Autosomal recessive conditions happen when a mutation has been inherited from the egg and sperm and include conditions like cystic fibrosis, thalassemia, hearing loss, spinal muscular atrophy, and more. X-linked conditions happen when a mutation has been inherited from the egg and include conditions like fragile X syndrome.  screening was also reviewed. About [place] Scre[facility]    The patient has declined the carrier screening options today. They are aware the option will remain, and they can contact us if they would like to pursue screening.     Philip had negative spinal muscular atrophy carrier screening in her last pregnancy.     Of note, Philip had cystic fibrosis carrier screening during her previous pregnancy. The family history is on her partner, Luke's side but he does not like needles so they started with Philip to see if she is a carrier first. Her screening was negative but only done with a 165 genotyping panel so it reduced her risk of being a carrier from 1 in 25 to 1 in 300.     Luke has a 1 in 2 (50% chance) of being a carrier since his sister is a carrier per Philip's report.     Using Philip's previous carrier screening results, they remain at a 1 in 2,400 (0.04%) chance of having a child with cystic fibrosis.     The more detailed full sequencing carrier screening that we offer could reduce her residual risk (make us more confident she is not a carrier) to 1 in 4400. This would leave their chance of having a child with the condition to 1 in 35,200  (0.002%).    If the more detailed carrier screening revealed Philip to be a carrier (for a less common mutation), they would then have a 1 in 8 (12.5%) chance of having a child with the condition. If they were both found to be carriers, there would be a 1 in 4 (25%).     Philip was sent a [employer] message summarizing this information. This can be further reviewed when NIPT results are called out.        RISK ASSESSMENT FOR CHROMOSOME CONDITIONS   We explained that the risk for fetal chromosome abnormalities increases with maternal age. We discussed specific features of common chromosome abnormalities, including Down syndrome, trisomy 13, trisomy 18, and sex chromosome trisomies.      At age 33 at midtrimester, the risk to have a baby with Down syndrome is 1 in 342.     At age 33 at midtrimester, the risk to have a baby with any chromosome abnormality is 1 in  172.     Philip has not had genetic screening in this pregnancy but elected to have screening today.      GENETIC TESTING OPTIONS   Genetic testing during a pregnancy includes screening and diagnostic procedures.      Screening tests are non-invasive which means no risk to the pregnancy and includes ultrasounds and blood work. The benefits and limitations of screening were reviewed. Screening tests provide a risk assessment (chance) specific to the pregnancy for certain fetal chromosome abnormalities but cannot definitively diagnose or exclude a fetal chromosome abnormality. Follow-up genetic counseling and consideration of diagnostic testing is recommended with any abnormal screening result. Diagnostic testing during a pregnancy is more certain and can test for more conditions. However, the tests do have a risk of miscarriage that requires careful consideration. These tests can detect fetal chromosome abnormalities with greater than 99% certainty. Results can be compromised by maternal cell contamination or mosaicism and are limited by the resolution of current  genetic testing technology.     There is no screening or diagnostic test that detects all forms of birth defects or intellectual disability.     We discussed the following screening options:   Non-invasive prenatal testing (NIPT)    Also called cell-free DNA screening because it detects chromosomes from the placenta in the pregnant person's blood    Can be done any time after 10 weeks gestation    Screens for trisomy 21, trisomy 18, trisomy 13, and sex chromosome aneuploidies    Cannot screen for open neural tube defects, maternal serum AFP after 15 weeks is recommended    We discussed the following ultrasound options:  Nuchal translucency (NT) ultrasound    Ultrasound between [id number] that includes nuchal translucency measurement and nasal bone assessments    Nuchal translucency refers to the space at the back of the neck where fluid builds up. All babies at this stage have fluid and there is only concern if there is too much fluid    Nasal bone refers to the small bone in the nose. There is concern for conditions like Down syndrome if the bone cannot be seen at all    This ultrasound can be done as part of first trimester screening, at the same time as another screen (NIPT), at the same time as a CVS, or if the patients does not want genetic screening.    Markers on ultrasound detects about 70% of pregnancies with aneuploidy    Abnormalities on NT ultrasound can also increase the risk for a birth defect, like a heart defect    Comprehensive level II ultrasound (Fetal Anatomy Ultrasound)    Ultrasound done between 18-20 weeks gestation    Screens for major birth defects and markers for aneuploidy (like trisomy 21 and trisomy 18)    Includes looking at the fetus/baby's growth, heart, organs (stomach, kidneys), placenta, and amniotic fluid    We discussed the following diagnostic options:   Amniocentesis    Invasive diagnostic procedure done after 15 weeks gestation    The procedure collects a small sample of  amniotic fluid for the purpose of chromosomal testing and/or other genetic testing    Diagnostic result; more than 99% sensitivity for fetal chromosome abnormalities    Testing for AFP in the amniotic fluid can test for open neural tube defects    It was a pleasure to be involved with Philip s care. Face-to-face time of the meeting was 30 minutes.    Monse Díaz GC, MS, [facility]  Certified and [place] Licensed Genetic Counselor  [facility]  Maternal Fetal Medicine  Office: [phone]  [facility]: [phone]   Fax: [phone]  [facility]

## 2023-06-07 NOTE — PROGRESS NOTES
Please see the imaging tab for details of the ultrasound performed today.    Nasima Hernandez MD  Specialist in Maternal-Fetal Medicine

## 2023-06-12 ENCOUNTER — TELEPHONE (OUTPATIENT)
Dept: MATERNAL FETAL MEDICINE | Facility: CLINIC | Age: 33
End: 2023-06-12
Payer: COMMERCIAL

## 2023-06-12 LAB — SCANNED LAB RESULT: NORMAL

## 2023-06-12 NOTE — TELEPHONE ENCOUNTER
Called Philip and left voicemail per patient's wishes to disclose their normal NIPT result.  Results indicate NO ANEUPLOIDY DETECTED for chromosomes 21, 18, and 13.     Sex chromosome analysis was also included and the result indicates NO ANEUPLOIDY DETECTED. The predicted fetal sex was not reported on the result due to the patient's preference at time of blood draw.    This puts her current pregnancy at low risk for Down syndrome, trisomy 18, trisomy 13 and sex chromosome abnormalities. This test is reported to have the following sensitivities: Down syndrome- 99%, trisomy 18- 98%, and trisomy 13- 98%. Although these results are reassuring, this does not replace a standard chromosome analysis from a chorionic villus sampling or amniocentesis.     Fetal anatomy ultrasound is recommended. This ultrasound will likely be completed with the patient's primary OB provider.     MSAFP is the appropriate second trimester screening test for open neural tube defects; the maternal quad screen is not recommended.    Her results are available in her Epic chart for her primary OB provider to review.    Monse Díaz MS, [facility]  Licensed Genetic Counselor   [facility]  Maternal Fetal Medicine  [email or web address]  [email or web address]  Office: [phone]  Pager [phone]  M: [phone]   Fax: [phone]

## 2023-06-23 NOTE — PATIENT INSTRUCTIONS
Pregnancy: Your Second Trimester Changes  Each day, you and your baby are changing and growing together. Here s a quick look at what s happening to both of you.  How you are changing  Even when you don t notice it, your body is adapting to meet the needs of your growing baby. The changes in your body might also affect your moods.  Your body  Your uterus expands as your baby grows. As the weeks go by, you will feel more pressure on your bladder, stomach, and other organs. You may notice some skin color changes on your forehead, nose, or cheeks. Freckles may darken, and moles may grow. You may notice a darker line on your abdomen between your belly button and pubic bone in the midline.  Your moods  The second trimester is often easier than the first. Still, be prepared for mood swings. These are from the increase in hormones made by your body. Hormones are chemicals that affect the way organs work. These mood swings are a normal part of pregnancy.  How your baby is growing    Month 4  Your baby s heartbeat may be heard with a Doppler (handheld ultrasound device) by 9 to 10 weeks. Eyebrows, eyelashes, and fingernails begin to form.    Month 5  You may feel your baby move. After a growth spurt, your baby nears 10 inches.    Month 6  Your baby s fingerprints have formed. Your baby weighs about 1 to 2 pounds and is about 12 inches long.    [employer] last reviewed this educational content on 7/1/2021 2000-2022 [employer]. All rights reserved. This information is not intended as a substitute for professional medical care. Always follow your healthcare professional's instructions.          Adapting to Pregnancy: Second Trimester  Keep up the healthy habits you started in your first trimester. You might be a little more tired than normal. So plan your day wisely. Look at the tips below and choose the ones that suit your lifestyle.   Note  If you have any questions, talk with your healthcare provider.   If  you work  If you can, adjust your work with your employer to fit your needs. Try these tips:     If you stand for long periods, find ways to do some tasks while sitting. Also, try to stand with 1 foot resting on a low stool or ledge. Shift your weight from foot to foot often. Wear low-heeled shoes.    If you sit, keep your knees level with your hips. Rest your feet on a firm surface. Sit tall with support for your low back.    If you work long hours, ask about adjusting your schedule. Try taking shorter breaks more often.  When you travel  The second trimester may be the best time for any travel. Talk to your healthcare provider about any special plans you may need to make. Always:     Wear a seat belt. Fasten the lap part under your belly. Wear the shoulder part also.    Take breaks often during long trips by car or plane. Move around to stretch your legs.    Drink plenty of fluids on flights. The air in plane cabins is very dry.    Stay out of hot climates or high altitudes if you are not used to them.    Stay away from places where the food and water might make you sick.    Make sure you are up-to-date on all vaccines, including the flu vaccine. This is especially important when traveling overseas.  Taking time to relax  Find time to rest and relax at work or at home:     Take short time-outs daily. Do relaxation exercises.    Breathe deeply during stressful times.    Try not to take on too much. Plan tasks for times when you have the most energy.    Take naps when you can. Or just sit and relax.    After week 16, don't lie on your back for more than a few minutes. Instead, lie on your side. Switch sides often.    Having sex  Unless your healthcare provider tells you otherwise, there is no reason to stop having sex now. Blood supply increases to the pelvic area in the second trimester. Because of this, sex might be more enjoyable. Try different positions and see what s best. Also talk with your partner about any  changes in desire. Spotting may happen after sex. Let your healthcare provider know if there is heavy bleeding.   Keeping your environment safe  You can still clean your house and use scented products. Just take some simple precautions:     Wear gloves when using cleaning fluids.    Open windows to let in fresh air. Use a fan if you paint.    Stay away from secondhand smoke.    Don t breathe fumes from nail polish, hair spray, cleansers, or other chemicals.  How daily issues affect your health  Many things in your daily life impact your health. This can include transportation, money problems, housing, access to food, and . If you can t get to medical appointments, you may not receive the care you need. When money is tight, it may be difficult to pay for medicines. And living far from a grocery store can make it hard to buy healthy food.   If you have concerns in any of these or other areas, talk with your healthcare team. They may know of local resources to assist you. Or they may have a staff person who can help.   [employer] last reviewed this educational content on 6/1/2021 2000-2022 [employer]. All rights reserved. This information is not intended as a substitute for professional medical care. Always follow your healthcare professional's instructions.

## 2023-06-28 ENCOUNTER — PRENATAL OFFICE VISIT (OUTPATIENT)
Dept: MIDWIFE SERVICES | Facility: CLINIC | Age: 33
End: 2023-06-28
Payer: COMMERCIAL

## 2023-06-28 VITALS
HEART RATE: 105 BPM | DIASTOLIC BLOOD PRESSURE: 75 MMHG | SYSTOLIC BLOOD PRESSURE: 114 MMHG | BODY MASS INDEX: 26.93 KG/M2 | WEIGHT: 169.4 LBS | OXYGEN SATURATION: 100 %

## 2023-06-28 DIAGNOSIS — Z34.82 ENCOUNTER FOR SUPERVISION OF OTHER NORMAL PREGNANCY IN SECOND TRIMESTER: Primary | ICD-10-CM

## 2023-06-28 PROCEDURE — 82105 ALPHA-FETOPROTEIN SERUM: CPT | Mod: 90 | Performed by: ADVANCED PRACTICE MIDWIFE

## 2023-06-28 PROCEDURE — 99207 PR PRENATAL VISIT: CPT | Performed by: ADVANCED PRACTICE MIDWIFE

## 2023-06-28 PROCEDURE — 99000 SPECIMEN HANDLING OFFICE-LAB: CPT | Performed by: ADVANCED PRACTICE MIDWIFE

## 2023-06-28 PROCEDURE — 36415 COLL VENOUS BLD VENIPUNCTURE: CPT | Performed by: ADVANCED PRACTICE MIDWIFE

## 2023-06-28 NOTE — PROGRESS NOTES
16w2d  Philip feeling well overall. Occasionally feels like she has extra mucous in her throat, tries to spit it up and that causes her to vomit. Discussed possible acid reflux causing the extra mucous. Will try tums to see if it improves. Will call/ mychart if she would like a prescription for pepcid to take daily as she had acid reflux with her first pregnancy. Denies cramping, bleeding, LOF, headache. AFP collected today. Fetal survey at next visit, ordered. RTC 4 weeks.  Jamila ROBERTS    I was present with the [employer] student who participated in the service and in the documentation of the services provided. I have verified the history and personally performed the physical exam and medical decision making, as documented by the student and edited by me.  Maxwell Sandoval CNM  June 28, 2023

## 2023-06-30 LAB
# FETUSES US: NORMAL
AFP MOM SERPL: 0.94
AFP SERPL-MCNC: 31 NG/ML
AGE - REPORTED: 34.2 YR
CURRENT SMOKER: NO
FAMILY MEMBER DISEASES HX: NO
GA METHOD: NORMAL
GA: NORMAL WK
IDDM PATIENT QL: NO
INTEGRATED SCN PATIENT-IMP: NORMAL
SPECIMEN DRAWN SERPL: NORMAL

## 2023-07-26 NOTE — PATIENT INSTRUCTIONS
"Weeks 18 to 22 of Your Pregnancy: Care Instructions  At this stage you may find that your nausea and fatigue are gone. You may feel better overall and have more energy. But you might now also have some new discomforts, like sleep problems or leg cramps.    You may start to feel your baby move. These movements can feel like butterflies or bubbles.   Babies at this stage can now suck their thumbs.     Get some exercise every day.  And avoid caffeine late in the day.     Take a warm shower or bath before bed.  Try relaxation exercises to calm your mind and body.     Use extra pillows.  They can help you get comfortable.     Don't use sleeping pills or alcohol.  They could harm your baby.     For leg cramps, stretch and apply heat.  A warm bath, leg warmers, a heating pad, or a hot water bottle can help with muscle aches.   Stretches for leg cramps    Straighten your leg and bend your foot (flex your ankle) slowly upward, toward your knee. Bend your toes up and down.   Stand on a flat surface. Stretch your toes upward. For balance, hold on to the wall or something stable. If it feels okay, take small steps walking on your heels.   Follow-up care is a key part of your treatment and safety. Be sure to make and go to all appointments, and call your doctor if you are having problems. It's also a good idea to know your test results and keep a list of the medicines you take.  Where can you learn more?  Go to https://www.[employer].net/patiented  Enter W603 in the search box to learn more about \"Weeks 18 to 22 of Your Pregnancy: Care Instructions.\"  Current as of: November 9, 2022               Content Version: 13.7    3518-7088 [employer].   Care instructions adapted under license by your healthcare professional. If you have questions about a medical condition or this instruction, always ask your healthcare professional. [employer] disclaims any warranty or liability for your use of this " "information.      Weeks 22 to 26 of Your Pregnancy: Care Instructions  Your baby's lungs are getting ready for breathing. Your baby may respond to your voice. Your baby likely turns less, and kicks or jerks more. Jerking may mean that your baby has hiccups.    Think about taking childbirth classes. And start to think about whether you want to have pain medicine during labor.   At your next doctor visit, you may be tested for anemia and for high blood sugar that first occurs during pregnancy (gestational diabetes). These conditions can cause problems for you and your baby.     To ease discomfort, such as back pain    Change your position often. Try not to sit or stand for too long.  Get some exercise. Things like walking or stretching may help.  Try using a heating pad or cold pack.    To ease or reduce swelling in your feet, ankles, hands, and fingers    Take off your rings.  Avoid high-sodium foods, such as potato chips.  Prop up your feet, and sleep with pillows under your feet.  Try to avoid standing for long periods of time.  Do not wear tight shoes.  Wear support stockings.  Kegel exercises to prevent urine from leaking    Squeeze your muscles as if you were trying not to pass gas. Your belly, legs, and buttocks shouldn't move. Hold the squeeze for 3 seconds, then relax for 5 to 10 seconds.    Add 1 second each week until you can squeeze for 10 seconds. Repeat the exercise 10 times a session. Do 3 to 8 sessions a day. If these exercises cause you pain, stop doing them and talk with your doctor.  Follow-up care is a key part of your treatment and safety. Be sure to make and go to all appointments, and call your doctor if you are having problems. It's also a good idea to know your test results and keep a list of the medicines you take.  Where can you learn more?  Go to https://www.healthwise.net/patiented  Enter G264 in the search box to learn more about \"Weeks 22 to 26 of Your Pregnancy: Care " "Instructions.\"  Current as of: November 9, 2022               Content Version: 13.7    9108-4869 [employer].   Care instructions adapted under license by your healthcare professional. If you have questions about a medical condition or this instruction, always ask your healthcare professional. [employer] disclaims any warranty or liability for your use of this information.      Round Ligament Pain: Care Instructions  Your Care Instructions     Round ligament pain is a common pain during pregnancy. You may feel a sharp brief pain on one or both sides of your belly. It may go down into your groin. It's usually felt for the first time during the second trimester.  This pain is a normal part of pregnancy. It will go away as your pregnancy continues or after your baby is born.  Your uterus is supported by two ligaments that go from the top and sides of the uterus to the bones of the pelvis. These are the round ligaments. As your uterus grows, these ligaments stretch and tighten with your movements. This may be the cause of the pain. You may find that certain activities seem to cause pain. If you can, avoid those activities.  Your doctor can usually diagnose round ligament pain from your symptoms and an exam. If you have bleeding or other symptoms, your doctor may also do an imaging test, such as an ultrasound. Your doctor may suggest that you take an over-the-counter pain medicine, such as acetaminophen.  Follow-up care is a key part of your treatment and safety. Be sure to make and go to all appointments, and call your doctor if you are having problems. It's also a good idea to know your test results and keep a list of the medicines you take.  How can you care for yourself at home?  If certain movements seem to trigger belly pain, see if you can avoid them while you are pregnant.  Stay active. If your doctor says it's okay, try moderate exercise. Water exercise may be a good choice if you have " "belly pain. Examples are swimming and water aerobics.  Ask your doctor about taking acetaminophen for pain. Be safe with medicines. Read and follow all instructions on the label.  When should you call for help?   Call your doctor now or seek immediate medical care if:    You think you might be in labor.     You have new or worse pain.   Watch closely for changes in your health, and be sure to contact your doctor if you have any problems.  Where can you learn more?  Go to https://www.[employer].net/patiented  Enter R110 in the search box to learn more about \"Round Ligament Pain: Care Instructions.\"  Current as of: November 9, 2022               Content Version: 13.7    2876-6888 [employer].   Care instructions adapted under license by your healthcare professional. If you have questions about a medical condition or this instruction, always ask your healthcare professional. [employer] disclaims any warranty or liability for your use of this information.      Leg and Ankle Edema: Care Instructions  Your Care Instructions  Swelling in the legs, ankles, and feet is called edema. It is common after you sit or stand for a while. Long plane flights or car rides often cause swelling in the legs and feet. You may also have swelling if you have to stand for long periods of time at your job. Problems with the veins in the legs (varicose veins) and changes in hormones can also cause swelling. Sometimes the swelling in the ankles and feet is caused by a more serious problem, such as heart failure, infection, blood clots, or liver or kidney disease.  Follow-up care is a key part of your treatment and safety. Be sure to make and go to all appointments, and call your doctor if you are having problems. It's also a good idea to know your test results and keep a list of the medicines you take.  How can you care for yourself at home?  If your doctor gave you medicine, take it as prescribed. Call your doctor if " "you think you are having a problem with your medicine.  Whenever you are resting, raise your legs up. Try to keep the swollen area higher than the level of your heart.  Take breaks from standing or sitting in one position.  Walk around to increase the blood flow in your lower legs.  Move your feet and ankles often while you stand, or tighten and relax your leg muscles.  Wear support stockings. Put them on in the morning, before swelling gets worse.  Eat a balanced diet. Lose weight if you need to.  Limit the amount of salt (sodium) in your diet. Salt holds fluid in the body and may increase swelling.  When should you call for help?   Call 911 anytime you think you may need emergency care. For example, call if:    You have symptoms of a blood clot in your lung (called a pulmonary embolism). These may include:  Sudden chest pain.  Trouble breathing.  Coughing up blood.   Call your doctor now or seek immediate medical care if:    You have signs of a blood clot, such as:  Pain in your calf, back of the knee, thigh, or groin.  Redness and swelling in your leg or groin.     You have symptoms of infection, such as:  Increased pain, swelling, warmth, or redness.  Red streaks or pus.  A fever.   Watch closely for changes in your health, and be sure to contact your doctor if:    Your swelling is getting worse.     You have new or worsening pain in your legs.     You do not get better as expected.   Where can you learn more?  Go to https://www.[employer].net/patiented  Enter N696 in the search box to learn more about \"Leg and Ankle Edema: Care Instructions.\"  Current as of: November 14, 2022               Content Version: 13.7    5736-3413 [employer].   Care instructions adapted under license by your healthcare professional. If you have questions about a medical condition or this instruction, always ask your healthcare professional. [employer] disclaims any warranty or liability for your use of this " information.      Back Pain During Pregnancy: Care Instructions  Overview     Back pain has many possible causes. It is often caused by problems with muscles and ligaments in your back. The extra weight during pregnancy can put stress on your back. Moving, lifting, standing, sitting, or sleeping in an awkward way also can strain your back. Back pain can also be a sign of labor. Although it may hurt a lot, back pain often improves on its own. Use good home treatment, and take care not to stress your back.  Follow-up care is a key part of your treatment and safety. Be sure to make and go to all appointments, and call your doctor if you are having problems. It's also a good idea to know your test results and keep a list of the medicines you take.  How can you care for yourself at home?  Ask your doctor about taking acetaminophen (Tylenol) for pain. Do not take aspirin, ibuprofen (Advil, Motrin), or naproxen (Aleve).  Do not take two or more pain medicines at the same time unless the doctor told you to. Many pain medicines have acetaminophen, which is Tylenol. Too much acetaminophen (Tylenol) can be harmful.  Lie on your side with your knees and hips bent and a pillow between your legs. This reduces stress on your back.  Put ice or cold packs on your back for 10 to 20 minutes at a time, several times a day. Put a thin cloth between the ice and your skin.  Warm baths may also help reduce pain.  Change positions every 30 minutes. Take breaks if you must sit for a long time. Get up and walk around.  Ask your doctor about how much exercise you can do. You may feel better taking short walks or doing gentle movements and stretching in a swimming pool.  Ask your doctor about exercises to stretch and strengthen your back.  When should you call for help?   Call your doctor now or seek immediate medical care if:    You think you are in labor.     You have new numbness in your buttocks, genital or rectal areas, or legs.     You  "have a new loss of bowel or bladder control.   Watch closely for changes in your health, and be sure to contact your doctor if:    You do not get better as expected.   Where can you learn more?  Go to https://www.[employer].net/patiented  Enter C696 in the search box to learn more about \"Back Pain During Pregnancy: Care Instructions.\"  Current as of: 2022               Content Version: 13.7    9426-0353 [employer].   Care instructions adapted under license by your healthcare professional. If you have questions about a medical condition or this instruction, always ask your healthcare professional. [employer] disclaims any warranty or liability for your use of this information.       Labor: Care Instructions  Overview      labor is the start of labor between 20 and 36 weeks of pregnancy. Most babies are born at 37 to 42 weeks of pregnancy. In labor, the uterus contracts to open the cervix. This is the first stage of childbirth.  labor can be caused by a problem with the baby, the mother, or both. Often the cause is not known.  In some cases, doctors use medicines to try to delay labor until 34 or more weeks of pregnancy. By this time, a baby has grown enough so that problems are not likely. In some cases--such as with a serious infection--it is healthier for the baby to be born early. Your treatment will depend on how far along you are in your pregnancy and on your health and your baby's health.  Follow-up care is a key part of your treatment and safety. Be sure to make and go to all appointments, and call your doctor if you are having problems. It's also a good idea to know your test results and keep a list of the medicines you take.  How can you care for yourself at home?  If your doctor prescribed medicines, take them exactly as directed. Call your doctor if you think you are having a problem with your medicine.  Rest until your doctor advises you about " "activity.  Do not have sexual intercourse unless your doctor says it is safe.  Use sanitary pads if you have vaginal bleeding. Using pads makes it easier to monitor your bleeding.  Do not smoke or allow others to smoke around you. If you need help quitting, talk to your doctor about stop-smoking programs and medicines. These can increase your chances of quitting for good.  When should you call for help?   Call 911  anytime you think you may need emergency care. For example, call if:    You passed out (lost consciousness).     You have a seizure.     You have severe vaginal bleeding.     You have severe pain in your belly or pelvis that doesn't get better between contractions.     You have had fluid gushing or leaking from your vagina and you know or think the umbilical cord is bulging into your vagina. If this happens, immediately get down on your knees so your rear end (buttocks) is higher than your head. This will decrease the pressure on the cord until help arrives.   Call your doctor now or seek immediate medical care if:    You have signs of preeclampsia, such as:  Sudden swelling of your face, hands, or feet.  New vision problems (such as dimness, blurring, or seeing spots).  A severe headache.     You have any vaginal bleeding.     You have belly pain or cramping.     You have a fever.     You have had regular contractions (with or without pain) for an hour. This means that you have 6 or more within 1 hour after you change your position and drink fluids.     You have a sudden release of fluid from the vagina.     You have low back pain or pelvic pressure that does not go away.     You notice that your baby has stopped moving or is moving much less than normal.   Watch closely for changes in your health, and be sure to contact your doctor if you have any problems.  Where can you learn more?  Go to https://www.healthwise.net/patiented  Enter Q400 in the search box to learn more about \" Labor: Care " "Instructions.\"  Current as of: 2022               Content Version: 13.7    [id number] [employer].   Care instructions adapted under license by your healthcare professional. If you have questions about a medical condition or this instruction, always ask your healthcare professional. [employer] disclaims any warranty or liability for your use of this information.        Understanding Round Ligament Pain in Pregnancy  Round ligament pain is a common problem in pregnancy. Ligaments are strong tissues that connect bones, muscles, and organs. There are two round ligaments. There is one on each side of the uterus. The top part of each ligament attaches to the upper side of the uterus. The bottom of each ligament attaches down in the pubic area. These ligaments help keep the uterus in place as you move around.     What causes round ligament pain in pregnancy?   As your uterus grows during pregnancy, the round ligaments are stretched and work harder when you move around. They may stretch too quickly when you stand up or bend or laugh. Nearby nerves may be irritated, or the ligaments may have a painful spasm.   Symptoms of ligament pain in pregnancy  The symptoms are sharp pains that last a few seconds. The pain may happen most often on the right side of the belly. It may happen in the hip, the lower belly, or even deep down in your pubic area. The pain may happen when you:   Move suddenly  Stand up  Walk  Roll over in bed  Laugh  Cough  Sneeze  Diagnosing round ligament pain in pregnancy   Your healthcare provider will ask about your symptoms and give you a physical exam. They may give you tests to check for other problems that can cause pain, such as an ovarian cyst or enlarged vein (varicocele). They will also check for signs of  labor or other pregnancy problems.   Treatment for round ligament pain in pregnancy   To help prevent pain:  Move slowly when you stand up, roll over, " turn, or bend.  Don t stand for long periods of time.  Don t lift heavy objects.  Do gentle daily stretches of your hip joints.  When to call your healthcare provider  Call your healthcare provider right away if you have any of these:   Fever of 100.4 F (38 C) or higher  Pains that last more than a few minutes  Pain that gets worse  Bleeding, nausea, vomiting, or other new symptoms  [employer] last reviewed this educational content on 11/1/2022 2000-2023 [employer]. All rights reserved. This information is not intended as a substitute for professional medical care. Always follow your healthcare professional's instructions.          Relieving Back Pain During Pregnancy: Wall Stretch, Body Bend   Before trying these exercises, talk to your healthcare provider to make sure they are safe for you. Ask your healthcare provider how many times to do each exercise.   Wall stretch  This strengthens and loosens the muscles in your upper back:   Lean against a wall with a firm pillow or rolled towel under your shoulder blades. Your feet should be about 12 inches from the wall and shoulder-width apart. Point your chin down.  Breathe in. Push your shoulders, neck, and head against the wall. You will feel a stretch in your shoulders.  Hold for 5 counts. Then breathe out, and relax your shoulders and neck.   Body bend  This strengthens your back and buttocks muscles:   Stand with your legs shoulder-width apart. Put your hands on your upper thighs and bend your knees slightly.  Slowly bend forward at the hips. Push your hips back and keep your shoulders up. Make sure your back is straight. You ll feel a stretch in your upper thighs. You ll also feel your back muscles holding you in position.  Hold for 5 counts, then straighten.   [facility] last reviewed this educational content on 7/1/2021 2000-2023 [employer]. All rights reserved. This information is not intended as a substitute for professional  medical care. Always follow your healthcare professional's instructions.          Pregnancy: Planning Your Exercise Routine  While you re pregnant, an exercise routine helps both your mind and your body feel good. It tones your muscles and makes them stronger. It also gives you and your baby more oxygen.   The right exercise for you    Overall conditioning is best for you and your baby. Try walking, swimming, or riding a stationary bike. Always warm up, cool down, and drink enough fluids. Keep a snack close by in case your blood sugar gets low. Discuss exercise choices with your healthcare provider. Talk about the following:   If you already exercise, find out how to adapt your routine while you re pregnant. Keep the intensity of the exercise moderate. As your pregnancy progresses, your center of gravity will change. Be careful to keep your balance.  Ask if there are any local prenatal exercise classes, such as yoga or water aerobics. Find out which prenatal exercise videos are good choices.  If you were not exercising before your pregnancy, find out the best way to start. Now is not the time to begin a new workout on your own. Start slowly. Listen to your body.  Ask which forms of exercise you should avoid. These may include risky activities like hot yoga, horseback riding, scuba diving, skiing, skating, and contact sports.  Pelvic tilts  These help strengthen your stomach muscles and low back. You can do pelvic tilts instead of sit-ups.   Do this exercise on your hands and knees.  Relax the back of your neck. Pull your stomach in until your low back flattens.  Hold for 30 seconds. Release. Repeat 10 times. Do this twice a day.  Kegel exercises  Kegel exercises strengthen the pelvic muscles. Doing Kegels daily helps prepare these muscles for delivery. Kegels also help ease your recovery. You exercise these muscles by tightening, holding, then relaxing them. To do 1 type of Kegel exercise, contract as if you were  stopping your urine stream (but do it when you re not urinating). Hold for 10 seconds, then repeat 10 times, a few times a day.   Tips to add activity  Here are some tips to follow:  Park the car farther from a store and walk.  If you can, do errands on foot instead of driving.  Walk across the office to talk to someone in person instead of calling.  While waiting for appointments, go up and down stairs or around the block.  Tips to stay active  Here are some tips to follow:  Maintain your routine. But exercise less intensely if you feel tired.  Base your workout on how you feel, not your heart rate. Heart rates aren t a good way to measure effort during pregnancy.  Don't exercise on your back after week 16.  What are the warning signs that I should stop exercising?  Stop exercising and call your healthcare provider if you have any of these symptoms:  Vaginal bleeding   Dizziness or feeling faint   Increased shortness of breath   Chest pain   Headache   Muscle weakness   Calf (back of the leg) pain or swelling    Uterine contractions or  labor   Decreased fetal movement   Fluid leaking (or gushing) from your vagina  [employer] last reviewed this educational content on 2021-2023 [employer]. All rights reserved. This information is not intended as a substitute for professional medical care. Always follow your healthcare professional's instructions.

## 2023-07-31 ENCOUNTER — PRENATAL OFFICE VISIT (OUTPATIENT)
Dept: MIDWIFE SERVICES | Facility: CLINIC | Age: 33
End: 2023-07-31
Attending: ADVANCED PRACTICE MIDWIFE
Payer: COMMERCIAL

## 2023-07-31 ENCOUNTER — ANCILLARY PROCEDURE (OUTPATIENT)
Dept: ULTRASOUND IMAGING | Facility: CLINIC | Age: 33
End: 2023-07-31
Attending: ADVANCED PRACTICE MIDWIFE
Payer: COMMERCIAL

## 2023-07-31 VITALS
WEIGHT: 170.6 LBS | DIASTOLIC BLOOD PRESSURE: 74 MMHG | BODY MASS INDEX: 27.12 KG/M2 | HEART RATE: 102 BPM | SYSTOLIC BLOOD PRESSURE: 106 MMHG | OXYGEN SATURATION: 100 %

## 2023-07-31 DIAGNOSIS — Z34.82 ENCOUNTER FOR SUPERVISION OF OTHER NORMAL PREGNANCY IN SECOND TRIMESTER: Primary | ICD-10-CM

## 2023-07-31 DIAGNOSIS — Z34.82 ENCOUNTER FOR SUPERVISION OF OTHER NORMAL PREGNANCY IN SECOND TRIMESTER: ICD-10-CM

## 2023-07-31 PROCEDURE — 76805 OB US >/= 14 WKS SNGL FETUS: CPT | Performed by: OBSTETRICS & GYNECOLOGY

## 2023-07-31 PROCEDURE — 99207 PR PRENATAL VISIT: CPT | Performed by: ADVANCED PRACTICE MIDWIFE

## 2023-07-31 NOTE — PROGRESS NOTES
Philip Pfeiffer is here for her PNV and Fetal Survey.    S=D and nl anatomy by prelim.  Discussed changes in 2nd pregnancy in regards to BH ctx and preg symptoms earlier than with 1st.  Is candidate for TOLAC due to placental abruption without underlying risk factors.     Has plan for TOLAC and physician consult.  ASSESSMENT: 21w0d   TOLAC   PLAN: RTC in 4 wks.  LEILA

## 2023-08-02 DIAGNOSIS — Z34.82 ENCOUNTER FOR SUPERVISION OF OTHER NORMAL PREGNANCY IN SECOND TRIMESTER: Primary | ICD-10-CM

## 2023-08-02 NOTE — PROGRESS NOTES
Follow-up ultrasound ordered due to distal spine and profile not adequately visualized on fetal survey. Called Philip and notified that repeat US is necessary.    Maxwell Sandoval CNM

## 2023-08-23 ENCOUNTER — LAB (OUTPATIENT)
Dept: LAB | Facility: CLINIC | Age: 33
End: 2023-08-23
Payer: COMMERCIAL

## 2023-08-23 ENCOUNTER — PRENATAL OFFICE VISIT (OUTPATIENT)
Dept: MIDWIFE SERVICES | Facility: CLINIC | Age: 33
End: 2023-08-23
Attending: ADVANCED PRACTICE MIDWIFE
Payer: COMMERCIAL

## 2023-08-23 ENCOUNTER — ANCILLARY PROCEDURE (OUTPATIENT)
Dept: ULTRASOUND IMAGING | Facility: CLINIC | Age: 33
End: 2023-08-23
Attending: ADVANCED PRACTICE MIDWIFE
Payer: COMMERCIAL

## 2023-08-23 VITALS
DIASTOLIC BLOOD PRESSURE: 71 MMHG | WEIGHT: 174.2 LBS | OXYGEN SATURATION: 97 % | HEART RATE: 92 BPM | BODY MASS INDEX: 27.7 KG/M2 | SYSTOLIC BLOOD PRESSURE: 113 MMHG

## 2023-08-23 DIAGNOSIS — Z34.82 ENCOUNTER FOR SUPERVISION OF OTHER NORMAL PREGNANCY IN SECOND TRIMESTER: Primary | ICD-10-CM

## 2023-08-23 DIAGNOSIS — Z34.82 ENCOUNTER FOR SUPERVISION OF OTHER NORMAL PREGNANCY IN SECOND TRIMESTER: ICD-10-CM

## 2023-08-23 LAB
GLUCOSE 1H P 50 G GLC PO SERPL-MCNC: 117 MG/DL (ref 70–129)
HGB BLD-MCNC: 11.7 G/DL (ref 11.7–15.7)
T PALLIDUM AB SER QL: NONREACTIVE

## 2023-08-23 PROCEDURE — 76816 OB US FOLLOW-UP PER FETUS: CPT

## 2023-08-23 PROCEDURE — 99207 PR PRENATAL VISIT: CPT | Performed by: ADVANCED PRACTICE MIDWIFE

## 2023-08-23 PROCEDURE — 76816 OB US FOLLOW-UP PER FETUS: CPT | Mod: 26 | Performed by: OBSTETRICS & GYNECOLOGY

## 2023-08-23 PROCEDURE — 36415 COLL VENOUS BLD VENIPUNCTURE: CPT

## 2023-08-23 PROCEDURE — 82950 GLUCOSE TEST: CPT

## 2023-08-23 PROCEDURE — 86780 TREPONEMA PALLIDUM: CPT

## 2023-08-23 ASSESSMENT — PATIENT HEALTH QUESTIONNAIRE - PHQ9: SUM OF ALL RESPONSES TO PHQ QUESTIONS 1-9: 1

## 2023-08-23 NOTE — PROGRESS NOTES
24w2d  Philip is here with her daughter after repeat US to see fetal parts not assessed on fetal survey. No report available yet, so unsure if they were able to see everything today. Will let her know if repeat US is necessary.    Philip passed her GCT this morning. Is feeling good, baby is active. Denies bleeding, leaking of fluid, headaches. Has her next appt scheduled with Dr. Cherry to discuss TOLAC.    Maxwell Sandoval CNM

## 2023-09-19 ENCOUNTER — PRENATAL OFFICE VISIT (OUTPATIENT)
Dept: OBGYN | Facility: CLINIC | Age: 33
End: 2023-09-19
Payer: COMMERCIAL

## 2023-09-19 VITALS
WEIGHT: 181.5 LBS | OXYGEN SATURATION: 99 % | SYSTOLIC BLOOD PRESSURE: 119 MMHG | BODY MASS INDEX: 28.86 KG/M2 | HEART RATE: 85 BPM | DIASTOLIC BLOOD PRESSURE: 76 MMHG

## 2023-09-19 DIAGNOSIS — Z98.891 HISTORY OF CESAREAN DELIVERY: ICD-10-CM

## 2023-09-19 DIAGNOSIS — Z23 NEED FOR TDAP VACCINATION: ICD-10-CM

## 2023-09-19 DIAGNOSIS — Z34.83 ENCOUNTER FOR SUPERVISION OF OTHER NORMAL PREGNANCY IN THIRD TRIMESTER: Primary | ICD-10-CM

## 2023-09-19 PROCEDURE — 99207 PR PRENATAL VISIT: CPT | Performed by: OBSTETRICS & GYNECOLOGY

## 2023-09-19 PROCEDURE — 90471 IMMUNIZATION ADMIN: CPT | Performed by: OBSTETRICS & GYNECOLOGY

## 2023-09-19 PROCEDURE — 90715 TDAP VACCINE 7 YRS/> IM: CPT | Performed by: OBSTETRICS & GYNECOLOGY

## 2023-09-19 NOTE — PROGRESS NOTES
Doing well.  Good fetal movement.   Presents for discussion of delivery plans.  H/o  for bradycardia after spontaneous abruption in labor.    calculator 71%.   Double layer closure.   More than 19 months between deliveries.   Not planning more pregnancies.     Discussed options. Held TOLAC consent.   Hoping to try for TOLAC, sayda if spontaneous labor.   Does not want to go past due date, would consider IOL or CS then.   Continue midwifery care, return to MDs for any visit for questions if desired.   TDaP today.  RTC 4 weeks.

## 2023-09-25 ENCOUNTER — MYC MEDICAL ADVICE (OUTPATIENT)
Dept: MIDWIFE SERVICES | Facility: CLINIC | Age: 33
End: 2023-09-25
Payer: COMMERCIAL

## 2023-09-25 DIAGNOSIS — R12 HEARTBURN: Primary | ICD-10-CM

## 2023-09-25 RX ORDER — FAMOTIDINE 20 MG/1
20 TABLET, FILM COATED ORAL 2 TIMES DAILY
Qty: 90 TABLET | Refills: 1 | Status: SHIPPED | OUTPATIENT
Start: 2023-09-25 | End: 2023-09-26

## 2023-09-25 NOTE — TELEPHONE ENCOUNTER
29w0d    Pt requesting heartburn medication discussed at last visit it needed  Pended pepcid unless you had another option in mind?  Routing to provider to advise.  Caprice Newman RN on 2023 at 11:33 AM

## 2023-09-26 RX ORDER — FAMOTIDINE 20 MG/1
20 TABLET, FILM COATED ORAL 2 TIMES DAILY
Qty: 90 TABLET | Refills: 1 | Status: SHIPPED | OUTPATIENT
Start: 2023-09-26 | End: 2024-01-23

## 2023-10-12 NOTE — PATIENT INSTRUCTIONS
Weeks 26 to 30 of Your Pregnancy: Care Instructions  You're starting your last trimester. You'll probably feel your baby moving around more. Your back may ache as your body gets used to your baby's size and length. Take care of yourself, and pay attention to what your body needs.    Talk to your doctor about getting the Tdap shot. It will help protect your  against whooping cough (pertussis). Also ask your doctor about flu and COVID-19 shots if you haven't had them yet. If your blood type is Rh negative, you may be given a shot of Rh immune globulin (such as RhoGAM). It can help prevent problems for your baby.   You may have Sawyer-Guzmna contractions. They are single or several strong contractions without a pattern. These are practice contractions but not the start of labor.   Be kind to yourself.     Take breaks when you're tired.  Change positions often. Don't sit for too long or stand for too long.  At work, rest during breaks if you can. If you don't get breaks, talk to your doctor about writing a letter to your employer to request them.  Avoid fumes, chemicals, and tobacco smoke.  Be sexual if you want to.     You may be interested in sex, or you may not. Everyone is different.  Sex is okay unless your doctor tells you not to.  Your belly can make it hard to find good positions for sex. [place] and explore.  Watch for signs of  labor.    These signs include:   Menstrual-like cramps. Or you may have pain or pressure in your pelvis that happens in a pattern.  About 6 or more contractions in an hour (even after rest and a glass of water).  A low, dull backache that doesn't go away when you change positions.  An increase or change in vaginal discharge.  Light vaginal bleeding or spotting.  Your water breaking.  Know what to do if you think you are having contractions.     Drink 1 or 2 glasses of water.  Lie down on your left side for at least an hour.  While on your side, feel the top of your  "belly to see if it's tight.  Write down your contractions for an hour. Time how long it is from the start of one contraction to the start of the next.  Call your doctor if you have regular contractions.  Follow-up care is a key part of your treatment and safety. Be sure to make and go to all appointments, and call your doctor if you are having problems. It's also a good idea to know your test results and keep a list of the medicines you take.  Where can you learn more?  Go to https://www.[employer].net/patiented  Enter S999 in the search box to learn more about \"Weeks 26 to 30 of Your Pregnancy: Care Instructions.\"  Current as of: November 9, 2022               Content Version: 13.7    9229-7951 [employer].   Care instructions adapted under license by your healthcare professional. If you have questions about a medical condition or this instruction, always ask your healthcare professional. [employer] disclaims any warranty or liability for your use of this information.      Weeks 30 to 32 of Your Pregnancy: Care Instructions  Your baby is growing more every day. Its eyes can open and close, and it may have hair on its head. Your baby may sleep 20 to 45 minutes at a time and is more active at certain times.    You should feel your baby move several times every day. Your baby now turns less and kicks more.   This is a good time to tour your hospital or birthing center. You may also want to find childcare if needed.     To ease heartburn    Avoid foods that make your symptoms worse, such as chocolate, spicy foods, and caffeine.  Avoid bending over or lying down after meals.  Do not eat for 2 hours before bedtime.  Take antacids like Tums, but don't take ones that have sodium bicarbonate, magnesium trisilicate, or aspirin.    To care for large, swollen veins (varicose veins)    Try to avoid standing for long periods of time.  Sit with your feet propped up.  Wear support hose.  Get some " "exercise every day, like walking or swimming.  Counting your baby's kicks  Your doctor may ask you to count your baby's movements, such as kicks, flutters, or rolls.    Find a quiet place, and get comfortable. Write down your start time. Count your baby's movements (except hiccups). When your baby has moved 10 times, you can stop counting. Write down how many minutes it took.   If an hour goes by and you don't feel 10 movements, have something to eat or drink. Count for another hour. If you don't feel at least 10 movements in the 2-hour period, call your doctor.   Follow-up care is a key part of your treatment and safety. Be sure to make and go to all appointments, and call your doctor if you are having problems. It's also a good idea to know your test results and keep a list of the medicines you take.  Where can you learn more?  Go to https://[email or web address]Block/patiented  Enter X471 in the search box to learn more about \"Weeks 30 to 32 of Your Pregnancy: Care Instructions.\"  Current as of: November 9, 2022               Content Version: 13.7    0426-3076 [email or web address].   Care instructions adapted under license by your healthcare professional. If you have questions about a medical condition or this instruction, always ask your healthcare professional. [email or web address] disclaims any warranty or liability for your use of this information.      Learning About Birth Control After Childbirth  What is birth control?     Birth control (contraception) is any method used to prevent pregnancy.  Wait until you're healed before you have sexual intercourse. This takes about 4 to 6 weeks. If you have sex without birth control, there is a chance that you could get pregnant. This is true even if you haven't started having periods again. Even if you breastfeed, you can still get pregnant.  Most experts suggest waiting at least 18 months to get pregnant again. This can reduce risks for you and the baby. There may " "be reasons for you to get pregnant sooner, though. So talk with your doctor about the risks and benefits.  The only sure way to not get pregnant is to not have sex. But finding a good method of birth control that you're comfortable with can help you avoid an unplanned pregnancy. Your doctor can help you choose the birth control method that's right for you.  What are the types of birth control?  Long-acting reversible contraception (LARC). This is the most effective reversible method you can use to prevent pregnancy. LARCs are implants and intrauterine devices (IUDs). While they are being used, they usually prevent pregnancy for years. If you decide you want to get pregnant, you can have them removed.  Implants are placed under the skin of the arm. This can be done right after you give birth. They release the hormone progestin. The implant prevents pregnancy for up to 5 years. Talk to your doctor about how long you can use it.  IUDs are placed in the uterus by a doctor. This can be done right after you give birth, if you and your doctor discuss it beforehand. Or it can be done at a doctor visit later. There are two main types of IUDs--the copper IUD and the hormonal IUD. The hormonal IUD releases progestin. IUDs prevent pregnancy for 3 to 12 years, depending on the type. Talk to your doctor about how long you can use it.  Hormonal methods. They are very good at preventing pregnancy. Combination birth control pills (\"the pill\"), skin patches, and vaginal rings release the hormones estrogen and progestin. Depo-Provera is a shot you get every 3 months. Shots, mini-pills, IUDs, and implants release progestin only. It's best to use progestin-only options in the first few weeks after you give birth.  Barrier methods. These don't prevent pregnancy as well as implants, IUDs, or hormonal methods do. Barrier methods include condoms, diaphragms, and cervical caps. You must use them every time you have sex. If you had a " diaphragm or cervical cap before you got pregnant, talk to your doctor to see if you need a different size. Condoms can be used anytime after you give birth.  Natural family planning. This is also known as fertility awareness or the rhythm method. It can work if you and your partner are careful and you have a regular ovulation cycle. But it doesn't work better than other birth control methods. You will need to keep records so you know when you are most likely to become pregnant. And during those times, you will need to use a barrier method or not have sex.  Permanent birth control (sterilization). It gives you lasting protection against pregnancy. A man can have a vasectomy. A woman can have her tubes tied (tubal ligation). But this is only a good choice if you are sure that you don't want any more children.  Emergency contraception. This is a backup method to prevent pregnancy if you didn't use birth control or if a condom breaks. The most effective emergency contraception is an IUD (inserted by a doctor). You can also get emergency contraceptive pills. You can get them with a prescription from your doctor or without a prescription at most drugstores.  How can you get birth control?  You can buy:  Condoms and spermicides without a prescription. You can get them in drugstores, online, and in many grocery stores.  Some forms of emergency contraception without a prescription. You can get these at most drugstores.  You need to see a doctor or visit family planning clinic to:  Get a prescription for birth control pills and other methods that use hormones.  Have an implant or IUD inserted. This includes the type of IUD used for emergency contraception.  Get a hormone shot.  Get a prescription for a diaphragm or cervical cap.  Get a prescription for certain kinds of emergency contraception.  Follow-up care is a key part of your treatment and safety. Be sure to make and go to all appointments, and call your doctor if you  "are having problems. It's also a good idea to know your test results and keep a list of the medicines you take.  Where can you learn more?  Go to https://www.B2Brev.net/patiented  Enter X408 in the search box to learn more about \"Learning About Birth Control After Childbirth.\"  Current as of: August 2, 2022               Content Version: 13.7    8552-2981 [employer].   Care instructions adapted under license by your healthcare professional. If you have questions about a medical condition or this instruction, always ask your healthcare professional. [employer] disclaims any warranty or liability for your use of this information.      Weeks 32 to 34 of Your Pregnancy: Care Instructions    Decide whether you want to bank or donate your baby's umbilical cord blood. If you want to save this blood, you have to arrange for it ahead of time.   Decide about circumcision. Personal, Restorationist, or cultural beliefs may play a role in your decision. You get to decide what you want for your baby.     Learn how to ease hemorrhoids.    Get more liquids, fruits, vegetables, and fiber in your diet.  Avoid sitting for too long.  Clean yourself with moist toilet paper. Or try witch hazel pads.  Try ice packs or warm sitz baths for discomfort.  Use hydrocortisone cream for pain or itching.  Ask your doctor about stool softeners.    Consider the benefits of breastfeeding.    It reduces your baby's risk of sudden infant death syndrome (SIDS).   babies are less likely to get certain infections. And they're less likely to be obese or get diabetes later in life.  It can lower your risk of breast and ovarian cancers and osteoporosis.  It saves you money.  Follow-up care is a key part of your treatment and safety. Be sure to make and go to all appointments, and call your doctor if you are having problems. It's also a good idea to know your test results and keep a list of the medicines you take.  Where " "can you learn more?  Go to https://www.[employer].net/patiented  Enter X711 in the search box to learn more about \"Weeks 32 to 34 of Your Pregnancy: Care Instructions.\"  Current as of: November 9, 2022               Content Version: 13.7    4752-3524 [employer].   Care instructions adapted under license by your healthcare professional. If you have questions about a medical condition or this instruction, always ask your healthcare professional. [employer] disclaims any warranty or liability for your use of this information.      You have been provided the Any Day Now: Early Labor at Home document.    Additional copies can be found here:  [email or web address]  You have been provided the What I'd Wish I'd Known About Giving Birth document.    Additional copies can be found here:  [email or web address]  "

## 2023-10-18 ENCOUNTER — PRENATAL OFFICE VISIT (OUTPATIENT)
Dept: MIDWIFE SERVICES | Facility: CLINIC | Age: 33
End: 2023-10-18
Payer: COMMERCIAL

## 2023-10-18 VITALS
HEART RATE: 114 BPM | WEIGHT: 185.4 LBS | DIASTOLIC BLOOD PRESSURE: 77 MMHG | OXYGEN SATURATION: 97 % | BODY MASS INDEX: 29.48 KG/M2 | SYSTOLIC BLOOD PRESSURE: 112 MMHG

## 2023-10-18 DIAGNOSIS — Z29.11 NEED FOR RSV IMMUNIZATION: ICD-10-CM

## 2023-10-18 DIAGNOSIS — S39.013A: ICD-10-CM

## 2023-10-18 DIAGNOSIS — Z34.83 ENCOUNTER FOR SUPERVISION OF OTHER NORMAL PREGNANCY, THIRD TRIMESTER: Primary | ICD-10-CM

## 2023-10-18 DIAGNOSIS — Z34.93 NORMAL PREGNANCY, THIRD TRIMESTER: ICD-10-CM
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
## 2023-10-18 PROCEDURE — 90471 IMMUNIZATION ADMIN: CPT | Performed by: ADVANCED PRACTICE MIDWIFE

## 2023-10-18 PROCEDURE — 90678 RSV VACC PREF BIVALENT IM: CPT | Performed by: ADVANCED PRACTICE MIDWIFE

## 2023-10-18 PROCEDURE — 99207 PR PRENATAL VISIT: CPT | Performed by: ADVANCED PRACTICE MIDWIFE

## 2023-10-18 NOTE — PROGRESS NOTES
32w5d  Philip here with Ann Marie, her daughter. She is feeling well with the exception of new pelvic pain. She feels like the baby has gotten very low in her pelvis and it is hard to sit for long periods of time. She is not having contractions with this pressure. Denies LOF, bleeding, headache, and visual changes. Gave Rx for maternity belt. Patient received RSV vaccine. RTC in one week.    Jamila ROBERTS    I was present with the [employer] student who participated in the service and in the documentation of the services provided. I have verified the history and personally performed the physical exam and medical decision making, as documented by the student and edited by me.     Nissa Beltre, BALJEET, APRN [employer]ONG

## 2023-11-01 ENCOUNTER — PRENATAL OFFICE VISIT (OUTPATIENT)
Dept: MIDWIFE SERVICES | Facility: CLINIC | Age: 33
End: 2023-11-01
Payer: COMMERCIAL

## 2023-11-01 VITALS
HEART RATE: 107 BPM | WEIGHT: 188.5 LBS | SYSTOLIC BLOOD PRESSURE: 127 MMHG | OXYGEN SATURATION: 100 % | BODY MASS INDEX: 29.97 KG/M2 | DIASTOLIC BLOOD PRESSURE: 79 MMHG

## 2023-11-01 DIAGNOSIS — Z34.83 ENCOUNTER FOR SUPERVISION OF OTHER NORMAL PREGNANCY, THIRD TRIMESTER: Primary | ICD-10-CM

## 2023-11-01 PROCEDURE — 99207 PR PRENATAL VISIT: CPT | Performed by: ADVANCED PRACTICE MIDWIFE

## 2023-11-01 NOTE — PROGRESS NOTES
34w2d  Patient is feeling well, here with daughter. Reports positive fetal movement and светлана peña contractions. Denies water leaking, vaginal bleeding, decreased fetal movement, severe contraction pain, vision changes, dizziness or persistent headaches. She is excited for baby to come, discussed her challenges preparing her home for baby with a toddler at home.    Danger signs reviewed, pre-eclampsia signs and symptoms discussed.   Knows when to call triage and has phone numbers.  RTC in 2 weeks.     Nany Cordova RN    Attestation   I was present with the [employer] student who participated in the service and in the documentation of the services provided. I have verified the history and personally performed the physical exam and medical decision making, as documented by the student and edited by me.  Rita VELEZ, BALJEET, MPH

## 2023-11-14 NOTE — PATIENT INSTRUCTIONS
Weeks 34 to 36 of Your Pregnancy: Care Instructions  Your belly has grown quite large. It's almost time to give birth! Your baby's lungs are almost ready to breathe air. The skull bones are firm enough to protect your baby's head. But they're soft enough to move down through the birth canal.    You might be wondering what to expect during labor. Because each birth is different, there's no way to know exactly what childbirth will be like for you. Talk to your doctor or midwife about any concerns you have.   You'll probably have a test for group B streptococcus (GBS). GBS is bacteria that can live in the vagina and rectum. GBS can make your baby sick after birth. If you test positive, you'll get antibiotics during labor.     Choose what type of pain relief you would like during labor.  You can choose from a few types, including medicine and non-medicine options. You may want to use several types of pain relief.     Know how medicines can help with pain during labor.  Some medicines lower anxiety and help with some of the pain. Others make your lower body numb so that you will feel less pain.     Tell your doctor about your pain medicine choice.  Do this before you start labor or very early in your labor. You may be able to change your mind during labor.     Learn about the stages of labor.    The first stage includes the early (latent) and active phases of labor. Contractions start in early labor. During active labor, contractions get stronger, last longer, and happen more often. And the cervix opens more rapidly.  The second stage starts when you're ready to push. During this stage, your baby is born.  During the third stage, you'll usually have a few more contractions to push out the placenta.   Follow-up care is a key part of your treatment and safety. Be sure to make and go to all appointments, and call your doctor if you are having problems. It's also a good idea to know your test results and keep a list of the  "medicines you take.  Where can you learn more?  Go to https://www."[employer].net/patiented  Enter B912 in the search box to learn more about \"Weeks 34 to 36 of Your Pregnancy: Care Instructions.\"  Current as of: 2023               Content Version: 13.8    1096-1405 [employer].   Care instructions adapted under license by your healthcare professional. If you have questions about a medical condition or this instruction, always ask your healthcare professional. [employer] disclaims any warranty or liability for your use of this information.      Group B Strep During Pregnancy: Care Instructions  Overview     Group B strep infection is caused by a type of bacteria. It's a different kind of bacteria than the kind that causes strep throat.  You may have this kind of bacteria in your body. Sometimes it may cause an infection, but most of the time it doesn't make you sick or cause symptoms. But if you pass the bacteria to your baby during the birth, it can cause serious health problems for your baby.  If you have this bacteria in your body, you will get antibiotics when you are in labor. Antibiotics help prevent problems for a  baby.  After birth, doctors will watch and may test your baby. If your baby tests positive for Group B strep, your baby will get antibiotics.  If you plan to breastfeed your baby, don't worry. It will be safe to breastfeed.  Follow-up care is a key part of your treatment and safety. Be sure to make and go to all appointments, and call your doctor if you are having problems. It's also a good idea to know your test results and keep a list of the medicines you take.  How can you care for yourself at home?  If your doctor has prescribed antibiotics, take them as directed. Do not stop taking them just because you feel better. You need to take the full course of antibiotics.  Tell your doctor if you are allergic to any antibiotic.  If you go into labor, or your " "water breaks, go to the hospital. Your doctor will give you antibiotics to help protect your baby from infection.  Tell the doctors and nurses if you have an allergy to penicillin.  Tell the doctors and nurses at the hospital that you tested positive for group B strep.  When should you call for help?   Call your doctor now or seek immediate medical care if:    You have symptoms of a urinary tract infection. These may include:  Pain or burning when you urinate.  A frequent need to urinate without being able to pass much urine.  Pain in the flank, which is just below the rib cage and above the waist on either side of the back.  Blood in your urine.  A fever.     You think you are in labor or your water has broken.     You have pain in your belly or pelvis.   Watch closely for changes in your health, and be sure to contact your doctor if you have any problems.  Where can you learn more?  Go to https://www.[employer]iZ3D/patiented  Enter M001 in the search box to learn more about \"Group B Strep During Pregnancy: Care Instructions.\"  Current as of: June 13, 2023               Content Version: 13.8    4985-1738 [employer].   Care instructions adapted under license by your healthcare professional. If you have questions about a medical condition or this instruction, always ask your healthcare professional. [employer] disclaims any warranty or liability for your use of this information.      Circumcision in Infants: What to Expect at Home  Your Child's Recovery  After circumcision, your baby's penis may look red and swollen. It may have petroleum jelly and gauze on it. The gauze will likely come off when your baby urinates. Follow your doctor's directions about whether to put clean gauze back on your baby's penis or to leave the gauze off. If you need to remove gauze from the penis, use warm water to soak the gauze and gently loosen it.  The doctor may have used a Plastibell device to do the " circumcision. If so, your baby will have a plastic ring around the head of the penis. The ring should fall off by itself in 10 to 12 days.  A thin, yellow film may form over the area the day after the procedure. This is part of the normal healing process. It should go away in a few days.  Your baby may seem fussy while the area heals. It may hurt for your baby to urinate. This pain often gets better in 3 or 4 days. But it may last for up to 2 weeks.  Even though your baby's penis will likely start to feel better after 3 or 4 days, it may look worse. The penis often starts to look like it's getting better after about 7 to 10 days.  This care sheet gives you a general idea about how long it will take for your child to recover. But each child recovers at a different pace. Follow the steps below to help your child get better as quickly as possible.  How can you care for your child at home?  Activity    Let your baby rest as much as possible. Sleeping will help with recovery.     You can give your baby a sponge bath the day after surgery. Ask your doctor when it is okay to give your baby a bath.   Medicines    Your doctor will tell you if and when your child can restart any medicines. The doctor will also give you instructions about your child taking any new medicines.     Your doctor may recommend giving your baby acetaminophen (Tylenol) to help with pain after the procedure. Be safe with medicines. Give your child medicines exactly as prescribed. Call your doctor if you think your child is having a problem with a medicine.     Do not give your child two or more pain medicines at the same time unless the doctor told you to. Many pain medicines have acetaminophen, which is Tylenol. Too much acetaminophen (Tylenol) can be harmful.   Circumcision care    Always wash your hands before and after touching the circumcision area.     Gently wash your baby's penis with plain, warm water after each diaper change, and pat it dry.  "Do not use soap. Don't use hydrogen peroxide or alcohol. They can slow healing.     Do not try to remove the film that forms on the penis. The film will go away on its own.     Put plenty of petroleum jelly (such as Vaseline) on the circumcision area during each diaper change. This will prevent your baby's penis from sticking to the diaper while it heals.     Fasten your baby's diapers loosely so that there is less pressure on the penis while it heals.   Follow-up care is a key part of your child's treatment and safety. Be sure to make and go to all appointments, and call your doctor if your child is having problems. It's also a good idea to know your child's test results and keep a list of the medicines your child takes.  When should you call for help?   Call your doctor now or seek immediate medical care if:    Your baby has a fever over 100.4 F.     Your baby is extremely fussy or irritable, has a high-pitched cry, or refuses to eat.     Your baby does not have a wet diaper within 12 hours after the circumcision.     You find a spot of bleeding larger than a 2-inch [place] from the incision.     Your baby has signs of infection. Signs may include severe swelling; redness; a red streak on the shaft of the penis; or a thick, yellow discharge.   Watch closely for changes in your child's health, and be sure to contact your doctor if:    A Plastibell device was used for the circumcision and the ring has not fallen off after 10 to 12 days.   Where can you learn more?  Go to https://www.[employer].net/patiented  Enter S255 in the search box to learn more about \"Circumcision in Infants: What to Expect at Home.\"  Current as of: February 28, 2023               Content Version: 13.8    0750-0096 [employer].   Care instructions adapted under license by your healthcare professional. If you have questions about a medical condition or this instruction, always ask your healthcare professional. [employer] disclaims any warranty or liability for your use of this information.

## 2023-11-15 ENCOUNTER — PRENATAL OFFICE VISIT (OUTPATIENT)
Dept: MIDWIFE SERVICES | Facility: CLINIC | Age: 33
End: 2023-11-15
Payer: COMMERCIAL

## 2023-11-15 VITALS
SYSTOLIC BLOOD PRESSURE: 110 MMHG | DIASTOLIC BLOOD PRESSURE: 74 MMHG | OXYGEN SATURATION: 96 % | HEART RATE: 104 BPM | WEIGHT: 190.8 LBS | BODY MASS INDEX: 30.33 KG/M2

## 2023-11-15 DIAGNOSIS — Z34.83 ENCOUNTER FOR SUPERVISION OF OTHER NORMAL PREGNANCY, THIRD TRIMESTER: Primary | ICD-10-CM

## 2023-11-15 DIAGNOSIS — Z23 HIGH PRIORITY FOR 2019-NCOV VACCINE: ICD-10-CM

## 2023-11-15 LAB
HGB BLD-MCNC: 11.8 G/DL (ref 11.7–15.7)
HOLD SPECIMEN: NORMAL

## 2023-11-15 PROCEDURE — 90480 ADMN SARSCOV2 VAC 1/ONLY CMP: CPT | Performed by: ADVANCED PRACTICE MIDWIFE

## 2023-11-15 PROCEDURE — 99207 PR PRENATAL VISIT: CPT | Performed by: ADVANCED PRACTICE MIDWIFE

## 2023-11-15 PROCEDURE — 87653 STREP B DNA AMP PROBE: CPT | Performed by: ADVANCED PRACTICE MIDWIFE

## 2023-11-15 PROCEDURE — 36415 COLL VENOUS BLD VENIPUNCTURE: CPT | Performed by: ADVANCED PRACTICE MIDWIFE

## 2023-11-15 PROCEDURE — 91320 SARSCV2 VAC 30MCG TRS-SUC IM: CPT | Performed by: ADVANCED PRACTICE MIDWIFE

## 2023-11-15 NOTE — PROGRESS NOTES
36w2d  Patient is feeling well. Positive fetal movement. Denies water leaking, vaginal bleeding, decreased fetal movement, contraction pain, or headaches.   Doing well, no questions or concerns today. Covid vaccination today. Flu and RSV already done. GBS and HGB today. BSUS showed cephalic position.   Danger signs reviewed, pre-eclampsia signs and symptoms discussed.   Knows when to call triage and has phone numbers.    Follow up in 1 week.   AGUSTIN Blanco CNM

## 2023-11-16 LAB — GP B STREP DNA SPEC QL NAA+PROBE: NEGATIVE

## 2023-11-22 ENCOUNTER — PRENATAL OFFICE VISIT (OUTPATIENT)
Dept: MIDWIFE SERVICES | Facility: CLINIC | Age: 33
End: 2023-11-22
Payer: COMMERCIAL

## 2023-11-22 VITALS
SYSTOLIC BLOOD PRESSURE: 113 MMHG | WEIGHT: 192.7 LBS | TEMPERATURE: 98.3 F | DIASTOLIC BLOOD PRESSURE: 73 MMHG | HEART RATE: 98 BPM | BODY MASS INDEX: 30.64 KG/M2

## 2023-11-22 DIAGNOSIS — O34.219 PREVIOUS CESAREAN DELIVERY, ANTEPARTUM CONDITION OR COMPLICATION: Primary | ICD-10-CM

## 2023-11-22 PROCEDURE — 99207 PR PRENATAL VISIT: CPT | Performed by: ADVANCED PRACTICE MIDWIFE

## 2023-11-22 NOTE — PROGRESS NOTES
37w2d   Here with Luke and adorable daughter.  Pt reports feeling very pregnant but coping well.  GBS neg.  RTC 1 wk AGUSTIN RonquilloM

## 2023-11-22 NOTE — PATIENT INSTRUCTIONS
"Week 37 of Your Pregnancy: Care Instructions    Most babies are born between 37 and 40 weeks.   This is a good time to pack a bag to take with you to the birth. Then it will be ready to go when you are.     Learn about breastfeeding.  For example, find out about ways to hold your baby to make breastfeeding easier. And think about learning how to pump and store milk.     Know that crying is normal.  It's common for babies to cry 1 to 3 hours a day. Some cry more, and some cry less.     Learn why babies cry.  They may be hungry; have gas; need a diaper change; or feel cold, warm, tired, lonely, or tense. Sometimes they cry for unknown reasons.     Think about what will help you stay calm when your baby cries.  Taking slow, deep breaths can help. So can taking a break. It's okay to put your baby somewhere safe (like their crib) and walk away for a few minutes.     Learn about safe sleep for your baby.  Always put your baby to sleep on their back. Place them alone in a crib or bassinet with a firm, flat surface. Keep soft items like stuffed animals out of the crib.     Learn what to expect with  poop.  Your baby will have their own bowel patterns. Some babies have several bowel movements a day. Some have fewer.     Know that  babies will often have loose, yellow bowel movements.  Formula-fed babies have more formed stools. If your baby's poop looks like pellets, your baby is constipated.   Follow-up care is a key part of your treatment and safety. Be sure to make and go to all appointments, and call your doctor if you are having problems. It's also a good idea to know your test results and keep a list of the medicines you take.  Where can you learn more?  Go to https://www.[employer].net/patiented  Enter N257 in the search box to learn more about \"Week 37 of Your Pregnancy: Care Instructions.\"  Current as of: 2023               Content Version: 13.8    9922-3069 [employer].   Care " instructions adapted under license by your healthcare professional. If you have questions about a medical condition or this instruction, always ask your healthcare professional. [employer] disclaims any warranty or liability for your use of this information.      Week 38 of Your Pregnancy: Care Instructions  Believe it or not, your baby is almost here. You may notice how your baby responds to sounds, warmth, cold, and light. You may even know what kind of music your baby likes.    Even if you expect a vaginal birth, it's a good idea to learn about  section ().  is the delivery of a baby through a cut (incision) in your belly. It's a major surgery, so it has more risks than a vaginal birth.   During the first 2 weeks after the birth, limit visitors. Don't allow visitors who have colds or infections. Ask visitors to wash their hands before they touch your baby. And never let anyone smoke around your baby.     Know about unplanned C-sections.  Reasons for an unplanned  include labor that slows or stops, signs of distress in your baby, and high blood pressure or other problems for you.     Know about planned C-sections.  If your baby isn't in a head-down position for delivery (breech position), your doctor may plan a . Or you may have a planned  if you're having twins or more.     Get as much help as you can while you're in the hospital.  You can learn about feeding, diapering, and bathing your baby.     Talk to your doctor or midwife about how to care for the umbilical cord stump.  If your baby will be circumcised, also ask about how to care for that.     Ask friends or family for help, as you need it.  If you can, have another adult in your home for at least 2 or 3 days after the birth.     Try to nap when your baby naps.  This may be your best chance to get some sleep.     Watch for changes in your mental health.  For the first 1 to 2 weeks after  "birth, it's common to cry or feel sad or irritable. If these feelings last for more than 2 weeks, you may have postpartum depression. Ask your doctor for help. Postpartum depression can be treated.   Follow-up care is a key part of your treatment and safety. Be sure to make and go to all appointments, and call your doctor if you are having problems. It's also a good idea to know your test results and keep a list of the medicines you take.  Where can you learn more?  Go to https://www.[employer].net/patiented  Enter B044 in the search box to learn more about \"Week 38 of Your Pregnancy: Care Instructions.\"  Current as of: 2023               Content Version: 13.8    5391-1481 [employer].   Care instructions adapted under license by your healthcare professional. If you have questions about a medical condition or this instruction, always ask your healthcare professional. [employer] disclaims any warranty or liability for your use of this information.      Week 39 of Your Pregnancy: Care Instructions    [place] babies can look different than what you see in pictures or movies. Their heads can be a strange shape right after birth. And they may have swollen eyes and red marks on their faces.   You can still get pregnant even if you are breastfeeding. If you don't want to get pregnant, talk to your doctor about birth control.   Tips for week 39 of pregnancy  If you plan to breastfeed, get prepared.     Continue to eat healthy foods.  Keep taking your prenatal vitamins during breastfeeding if your doctor recommends it.  Talk to your doctor before taking any medicines or supplements.  Choose the right birth control for you.     Intrauterine devices (IUDs) are placed in the uterus. Sometimes the IUD can be placed right after giving birth. They work for years.  Hormonal implants are placed under the skin of the arm. They also work for years.  Depo-Provera is a shot. You get it every 3 " "months.  Birth control pills can be used. They're taken every day.  Tubal ligation (tying your tubes) and vasectomy are surgeries. They're permanent.  Diaphragms, spermicide, and condoms must be used each time you have sex. If you used a diaphragm before, you should get refitted after the baby is born.  A birth control patch or ring can be used. These just can't be started until several weeks after you give birth.  Follow-up care is a key part of your treatment and safety. Be sure to make and go to all appointments, and call your doctor if you are having problems. It's also a good idea to know your test results and keep a list of the medicines you take.  Where can you learn more?  Go to https://www.[employer]/patiented  Enter A811 in the search box to learn more about \"Week 39 of Your Pregnancy: Care Instructions.\"  Current as of: 2023               Content Version: 13.8    1910-8601 [employer].   Care instructions adapted under license by your healthcare professional. If you have questions about a medical condition or this instruction, always ask your healthcare professional. [employer] disclaims any warranty or liability for your use of this information.      Weeks 40 to 41 of Your Pregnancy: Care Instructions  By week 40, you've reached your due date. Your baby could be coming any day. Most babies born after their due dates are healthy. But you may have tests to make sure everything is okay. If you feel stressed, you could try a meditation exercise, such as guided imagery. It may help you relax.    If you are past 41 weeks, your doctor may measure the amount of fluid that surrounds your baby. They may also test your baby's movement and heart rate.   If you don't start labor on your own by 41 or 42 weeks, your doctor may want to start (induce) labor. If there are other concerns, your doctor may talk to you about a .   To start (induce) your labor, your doctor may " "do any of these things.    Place a narrow tube with a small balloon on the end (balloon catheter) into your cervix.  The doctor inflates the balloon. This helps your cervix open (dilate).     Sweep the membranes (separate the lining of the amniotic sac from the uterus).  This helps the uterus make a chemical that can start contractions.     \"Break your water\" (rupture the amniotic sac).  This may be done if your cervix has started to open.     Use medicines.  They may be used to soften the cervix or start contractions.   How to do guided imagery    Close your eyes, and take a few deep breaths. Picture a peaceful setting, such as a beach or a meadow. Add a winding path. Follow the path until you feel more and more relaxed.   Take a few minutes to breathe slowly and feel the calm. When you are ready, slowly take yourself back to the present. Count to 3, and open your eyes.   Follow-up care is a key part of your treatment and safety. Be sure to make and go to all appointments, and call your doctor if you are having problems. It's also a good idea to know your test results and keep a list of the medicines you take.  Where can you learn more?  Go to https://www.[employer].net/patiented  Enter T922 in the search box to learn more about \"Weeks 40 to 41 of Your Pregnancy: Care Instructions.\"  Current as of: July 11, 2023               Content Version: 13.8    8674-6311 "[employer].   Care instructions adapted under license by your healthcare professional. If you have questions about a medical condition or this instruction, always ask your healthcare professional. "[employer] disclaims any warranty or liability for your use of this information.      Labor Induction  What You Need to Know  What is labor induction?  In most cases, it is best to go into labor naturally. When labor does not start on its own, we may use medicine or other methods to start (induce) labor. This is called induction, " "which:  Helps the uterus contract  Thins, softens and opens the cervix (opening of the uterus)  When is induction used?  There are two types of induction.  Medical induction may be done to protect the health of the parent or baby if:  There are medical concerns for you or your baby.  You haven't had your baby by 41 weeks.  Induction for non-medical reasons may be done at 39 weeks or later if:  You live far from the hospital.  You've been having contractions for many days.  You've given birth quickly in the past.   We will not perform an induction for non-medical reasons before 39 weeks. Studies show that babies born before 39 weeks may struggle with breathing, feeding, sleeping and staying warm. They are more likely to have health problems and may need to stay in the hospital longer. If we start your labor for medical reasons, the benefits will outweigh these risks. We will talk to you about your risks, benefits and alternatives (other options) before we start your labor.  How is induction done?  We may start your labor by doing one or more of the following:  We may need to help your cervix soften and open (sometimes called \"ripening\") to prepare it for labor. There are two ways to do this:  Medicines--these may be in the form of pills that you swallow or medicines that are put into the vagina next to the cervix.  Mechanical--using either a single or double balloon. These are small balloons that are attached to tubes that go up inside the cervix. The balloons are then filled with water to put pressure on the cervix and help the cervix soften and open. Depending on the type of balloon used, you may be allowed to go home after it is placed.  After your cervix is ready:  We may give you medicine through an IV (a small tube placed in your vein). This medicine is called Pitocin. It helps the muscles of your uterus to contract.  We may make a small opening in your bag of water (the sac around your baby). This is called an " "amniotomy. Sometimes called \"breaking the water\". It may help your uterus contract and your cervix open.  What might happen if my labor is induced?  Some of this depends on how your labor is started and how your body responds. Your labor may be more complicated. You and your baby may need more medical treatments. In general:  You may not go into labor right away. If so, we may send you home with follow-up instructions.  It will be important to monitor you and your baby during the induction.  You may not be able to move around during labor. You will have two belts with monitors attached to your belly. These allow the nurse to watch your contractions and your baby's heart rate.  Your labor may take longer than if you went into labor on your own. It might take more than one day.  If you need cervical ripening, it is important to know that it may be many hours (even days) until delivery happens.  Cervical ripening can be slow and require several doses before your body is ready to labor.  A long labor may increase the risk of infection in mother and baby.  Your labor may not progress as planned. This could lead to a  birth.  Can I plan the date of my delivery?  After 39 weeks, you may ask about planning your delivery date. This is only an option if your body--and your baby--are ready. To find out, we will check your cervix and your baby's heart rate.   If you are ready to be induced, we will give you a date and time to come to the hospital. If many patients are in labor that day, we may need to start your labor at another time.  If you are not ready, we will not start your labor. It will be safer for your baby to come on its own.  What else do I need to know?  Before you have an induction, make sure you understand the reasons, risks and benefits. Ask your doctor or nurse-midwife:  Why do I need to be induced?  What are the risks to my baby?  How will you start my labor?  How will you know if my baby is ready to " be born?  How will you know if my body is ready to give birth?  Where can I get more information?  To learn more about induction, you may visit these websites:  The American College of Nurse-Midwives:  www.mymidwife.org  The American College of Obstetricians and Gynecologists: www.acog.org  Childbirth Connection:  www.childbirthconnection.org   Dimes: www.marchofdimes.[employer]  Association of Women's Health, Obstetrics, and  Nursing  www.kircqk7pos.org/go-the-full-40&#047;  For informational purposes only. Not to replace the advice of your health care provider. Copyright   2008 [facility]. All rights reserved. Clinically reviewed by the  System Operations [employer] team. [employer] [facility] [id number] - REV .

## 2023-11-27 ENCOUNTER — PRENATAL OFFICE VISIT (OUTPATIENT)
Dept: MIDWIFE SERVICES | Facility: CLINIC | Age: 33
End: 2023-11-27
Payer: COMMERCIAL

## 2023-11-27 VITALS
DIASTOLIC BLOOD PRESSURE: 78 MMHG | SYSTOLIC BLOOD PRESSURE: 127 MMHG | WEIGHT: 191.9 LBS | BODY MASS INDEX: 30.51 KG/M2 | OXYGEN SATURATION: 98 % | HEART RATE: 109 BPM

## 2023-11-27 DIAGNOSIS — O34.219 PREVIOUS CESAREAN DELIVERY, ANTEPARTUM CONDITION OR COMPLICATION: Primary | ICD-10-CM

## 2023-11-27 DIAGNOSIS — Z34.83 ENCOUNTER FOR SUPERVISION OF OTHER NORMAL PREGNANCY IN THIRD TRIMESTER: Primary | ICD-10-CM

## 2023-11-27 PROCEDURE — 99207 PR PRENATAL VISIT: CPT | Performed by: ADVANCED PRACTICE MIDWIFE

## 2023-11-27 NOTE — PROGRESS NOTES
38w0d   Patient is feeling well. Positive fetal movement. Denies water leaking, vaginal bleeding, decreased fetal movement, contraction pain, or headaches.   Doing well, hoping to be done soon. Does not desire to go past her due date, she went past her due date last time and did not have a good experience with the delivery. She plans IOL at the end of next week if her cervix is open and she is able to be induced. If not, she desires a  section to be done . Will set up these plans today, and send a [employer] message for confirmation. She plans IOL in the evening so will be seen that day in clinic to determine if she plans to continue with the IOL or cancel it and do the  section instead. Will plan for Friday so she can get her  section labs done that day as well.   Danger signs reviewed, pre-eclampsia signs and symptoms discussed.   Knows when to call triage and has phone numbers.    Follow up in 1 week.   AGUSTIN Blanco CNM

## 2023-12-01 ENCOUNTER — MYC MEDICAL ADVICE (OUTPATIENT)
Dept: MIDWIFE SERVICES | Facility: CLINIC | Age: 33
End: 2023-12-01
Payer: COMMERCIAL

## 2023-12-01 DIAGNOSIS — B02.9 HERPES ZOSTER WITHOUT COMPLICATION: Primary | ICD-10-CM

## 2023-12-01 RX ORDER — ACYCLOVIR 800 MG/1
800 TABLET ORAL
Qty: 35 TABLET | Refills: 0 | Status: SHIPPED | OUTPATIENT
Start: 2023-12-01 | End: 2024-01-23

## 2023-12-01 NOTE — TELEPHONE ENCOUNTER
Call to pt, discuss shingles and if she delivers soon to avoid letting the baby contact any HSV lesion, could be life threatening for baby.    Discuss with peds at hospital admission.    (B02.9) Herpes zoster without complication  (primary encounter diagnosis)  Comment:   Plan: acyclovir (ZOVIRAX) 800 MG tablet            AGUSTIN RonquilloM

## 2023-12-01 NOTE — TELEPHONE ENCOUNTER
38w4d  Called regarding Shingles  Started lightly on Wednesday   No fevers/chills  Shingles right on bra line    Derm did not prescribed anything because they wanted her to talk to us first.    Routing to provider to review.  Pharm updated in chart.    Adriane Ariza RN

## 2023-12-04 ENCOUNTER — PRENATAL OFFICE VISIT (OUTPATIENT)
Dept: MIDWIFE SERVICES | Facility: CLINIC | Age: 33
End: 2023-12-04
Payer: COMMERCIAL

## 2023-12-04 VITALS
SYSTOLIC BLOOD PRESSURE: 111 MMHG | DIASTOLIC BLOOD PRESSURE: 77 MMHG | BODY MASS INDEX: 30.76 KG/M2 | WEIGHT: 193.5 LBS | HEART RATE: 100 BPM | TEMPERATURE: 98 F

## 2023-12-04 DIAGNOSIS — Z34.83 ENCOUNTER FOR SUPERVISION OF OTHER NORMAL PREGNANCY, THIRD TRIMESTER: Primary | ICD-10-CM

## 2023-12-04 PROCEDURE — 99207 PR PRENATAL VISIT: CPT | Performed by: ADVANCED PRACTICE MIDWIFE

## 2023-12-04 NOTE — PATIENT INSTRUCTIONS
Week 38 of Your Pregnancy: Care Instructions  Believe it or not, your baby is almost here. You may notice how your baby responds to sounds, warmth, cold, and light. You may even know what kind of music your baby likes.    Even if you expect a vaginal birth, it's a good idea to learn about  section ().  is the delivery of a baby through a cut (incision) in your belly. It's a major surgery, so it has more risks than a vaginal birth.   During the first 2 weeks after the birth, limit visitors. Don't allow visitors who have colds or infections. Ask visitors to wash their hands before they touch your baby. And never let anyone smoke around your baby.     Know about unplanned C-sections.  Reasons for an unplanned  include labor that slows or stops, signs of distress in your baby, and high blood pressure or other problems for you.     Know about planned C-sections.  If your baby isn't in a head-down position for delivery (breech position), your doctor may plan a . Or you may have a planned  if you're having twins or more.     Get as much help as you can while you're in the hospital.  You can learn about feeding, diapering, and bathing your baby.     Talk to your doctor or midwife about how to care for the umbilical cord stump.  If your baby will be circumcised, also ask about how to care for that.     Ask friends or family for help, as you need it.  If you can, have another adult in your home for at least 2 or 3 days after the birth.     Try to nap when your baby naps.  This may be your best chance to get some sleep.     Watch for changes in your mental health.  For the first 1 to 2 weeks after birth, it's common to cry or feel sad or irritable. If these feelings last for more than 2 weeks, you may have postpartum depression. Ask your doctor for help. Postpartum depression can be treated.   Follow-up care is a key part of your treatment and safety. Be sure to make and go  "to all appointments, and call your doctor if you are having problems. It's also a good idea to know your test results and keep a list of the medicines you take.  Where can you learn more?  Go to https://www.[employer].net/patiented  Enter B044 in the search box to learn more about \"Week 38 of Your Pregnancy: Care Instructions.\"  Current as of: 2023               Content Version: 13.8    4275-9931 [employer].   Care instructions adapted under license by your healthcare professional. If you have questions about a medical condition or this instruction, always ask your healthcare professional. [employer] disclaims any warranty or liability for your use of this information.      Week 39 of Your Pregnancy: Care Instructions    [place] babies can look different than what you see in pictures or movies. Their heads can be a strange shape right after birth. And they may have swollen eyes and red marks on their faces.   You can still get pregnant even if you are breastfeeding. If you don't want to get pregnant, talk to your doctor about birth control.   Tips for week 39 of pregnancy  If you plan to breastfeed, get prepared.     Continue to eat healthy foods.  Keep taking your prenatal vitamins during breastfeeding if your doctor recommends it.  Talk to your doctor before taking any medicines or supplements.  Choose the right birth control for you.     Intrauterine devices (IUDs) are placed in the uterus. Sometimes the IUD can be placed right after giving birth. They work for years.  Hormonal implants are placed under the skin of the arm. They also work for years.  Depo-Provera is a shot. You get it every 3 months.  Birth control pills can be used. They're taken every day.  Tubal ligation (tying your tubes) and vasectomy are surgeries. They're permanent.  Diaphragms, spermicide, and condoms must be used each time you have sex. If you used a diaphragm before, you should get refitted after the " "baby is born.  A birth control patch or ring can be used. These just can't be started until several weeks after you give birth.  Follow-up care is a key part of your treatment and safety. Be sure to make and go to all appointments, and call your doctor if you are having problems. It's also a good idea to know your test results and keep a list of the medicines you take.  Where can you learn more?  Go to https://www.[employer].net/patiented  Enter A811 in the search box to learn more about \"Week 39 of Your Pregnancy: Care Instructions.\"  Current as of: 2023               Content Version: 13.8    8036-8842 [employer].   Care instructions adapted under license by your healthcare professional. If you have questions about a medical condition or this instruction, always ask your healthcare professional. [employer] disclaims any warranty or liability for your use of this information.      Weeks 40 to 41 of Your Pregnancy: Care Instructions  By week 40, you've reached your due date. Your baby could be coming any day. Most babies born after their due dates are healthy. But you may have tests to make sure everything is okay. If you feel stressed, you could try a meditation exercise, such as guided imagery. It may help you relax.    If you are past 41 weeks, your doctor may measure the amount of fluid that surrounds your baby. They may also test your baby's movement and heart rate.   If you don't start labor on your own by 41 or 42 weeks, your doctor may want to start (induce) labor. If there are other concerns, your doctor may talk to you about a .   To start (induce) your labor, your doctor may do any of these things.    Place a narrow tube with a small balloon on the end (balloon catheter) into your cervix.  The doctor inflates the balloon. This helps your cervix open (dilate).     Sweep the membranes (separate the lining of the amniotic sac from the uterus).  This helps the " "uterus make a chemical that can start contractions.     \"Break your water\" (rupture the amniotic sac).  This may be done if your cervix has started to open.     Use medicines.  They may be used to soften the cervix or start contractions.   How to do guided imagery    Close your eyes, and take a few deep breaths. Picture a peaceful setting, such as a beach or a meadow. Add a winding path. Follow the path until you feel more and more relaxed.   Take a few minutes to breathe slowly and feel the calm. When you are ready, slowly take yourself back to the present. Count to 3, and open your eyes.   Follow-up care is a key part of your treatment and safety. Be sure to make and go to all appointments, and call your doctor if you are having problems. It's also a good idea to know your test results and keep a list of the medicines you take.  Where can you learn more?  Go to https://www.[employer]/patiented  Enter T922 in the search box to learn more about \"Weeks 40 to 41 of Your Pregnancy: Care Instructions.\"  Current as of: July 11, 2023               Content Version: 13.8    4034-3155 [employer].   Care instructions adapted under license by your healthcare professional. If you have questions about a medical condition or this instruction, always ask your healthcare professional. [employer] disclaims any warranty or liability for your use of this information.      Labor Induction  What You Need to Know  What is labor induction?  In most cases, it is best to go into labor naturally. When labor does not start on its own, we may use medicine or other methods to start (induce) labor. This is called induction, which:  Helps the uterus contract  Thins, softens and opens the cervix (opening of the uterus)  When is induction used?  There are two types of induction.  Medical induction may be done to protect the health of the parent or baby if:  There are medical concerns for you or your baby.  You " "haven't had your baby by 41 weeks.  Induction for non-medical reasons may be done at 39 weeks or later if:  You live far from the hospital.  You've been having contractions for many days.  You've given birth quickly in the past.   We will not perform an induction for non-medical reasons before 39 weeks. Studies show that babies born before 39 weeks may struggle with breathing, feeding, sleeping and staying warm. They are more likely to have health problems and may need to stay in the hospital longer. If we start your labor for medical reasons, the benefits will outweigh these risks. We will talk to you about your risks, benefits and alternatives (other options) before we start your labor.  How is induction done?  We may start your labor by doing one or more of the following:  We may need to help your cervix soften and open (sometimes called \"ripening\") to prepare it for labor. There are two ways to do this:  Medicines--these may be in the form of pills that you swallow or medicines that are put into the vagina next to the cervix.  Mechanical--using either a single or double balloon. These are small balloons that are attached to tubes that go up inside the cervix. The balloons are then filled with water to put pressure on the cervix and help the cervix soften and open. Depending on the type of balloon used, you may be allowed to go home after it is placed.  After your cervix is ready:  We may give you medicine through an IV (a small tube placed in your vein). This medicine is called Pitocin. It helps the muscles of your uterus to contract.  We may make a small opening in your bag of water (the sac around your baby). This is called an amniotomy. Sometimes called \"breaking the water\". It may help your uterus contract and your cervix open.  What might happen if my labor is induced?  Some of this depends on how your labor is started and how your body responds. Your labor may be more complicated. You and your baby may " need more medical treatments. In general:  You may not go into labor right away. If so, we may send you home with follow-up instructions.  It will be important to monitor you and your baby during the induction.  You may not be able to move around during labor. You will have two belts with monitors attached to your belly. These allow the nurse to watch your contractions and your baby's heart rate.  Your labor may take longer than if you went into labor on your own. It might take more than one day.  If you need cervical ripening, it is important to know that it may be many hours (even days) until delivery happens.  Cervical ripening can be slow and require several doses before your body is ready to labor.  A long labor may increase the risk of infection in mother and baby.  Your labor may not progress as planned. This could lead to a  birth.  Can I plan the date of my delivery?  After 39 weeks, you may ask about planning your delivery date. This is only an option if your body--and your baby--are ready. To find out, we will check your cervix and your baby's heart rate.   If you are ready to be induced, we will give you a date and time to come to the hospital. If many patients are in labor that day, we may need to start your labor at another time.  If you are not ready, we will not start your labor. It will be safer for your baby to come on its own.  What else do I need to know?  Before you have an induction, make sure you understand the reasons, risks and benefits. Ask your doctor or nurse-midwife:  Why do I need to be induced?  What are the risks to my baby?  How will you start my labor?  How will you know if my baby is ready to be born?  How will you know if my body is ready to give birth?  Where can I get more information?  To learn more about induction, you may visit these websites:  The American College of Nurse-Midwives:  www.mymidwife.org  The American College of Obstetricians and Gynecologists:  www.acog.org  Childbirth Connection:  www.childbirthconnection.org  March of Dimes: www.marchofdimes.com  Association of Women's Health, Obstetrics, and  Nursing  www.tsjzvs2ayu.org/go-the-full-40&#047;  For informational purposes only. Not to replace the advice of your health care provider. Copyright   2008 [facility]. All rights reserved. Clinically reviewed by the  System Operations Leadership team. [employer] 241959 - REV .

## 2023-12-04 NOTE — PROGRESS NOTES
39w0d  Patient reports feeling well. She is here today with her partner and daughter. Endorses positive fetal movement. Denies any concern with pregnancy. No loss of fluid, no vaginal bleeding. Currently, thinking she does not want an IOL, but is planning to make the decision on Friday.  is scheduled for  if declines IOL. Warning signs reviewed and reviewed calling triage or coming in for any concerns. Current SVE: /2. Follow up scheduled for Friday.    Rita VELEZ, ANKITAM, MPH

## 2023-12-05 ENCOUNTER — ANESTHESIA EVENT (OUTPATIENT)
Dept: OBGYN | Facility: CLINIC | Age: 33
End: 2023-12-05
Payer: COMMERCIAL

## 2023-12-07 ENCOUNTER — HOSPITAL ENCOUNTER (OUTPATIENT)
Facility: CLINIC | Age: 33
End: 2023-12-07
Attending: ADVANCED PRACTICE MIDWIFE | Admitting: ADVANCED PRACTICE MIDWIFE
Payer: COMMERCIAL

## 2023-12-07 LAB
ABO/RH(D): NORMAL
ANTIBODY SCREEN: NEGATIVE
SPECIMEN EXPIRATION DATE: NORMAL

## 2023-12-08 ENCOUNTER — PREP FOR PROCEDURE (OUTPATIENT)
Dept: OBGYN | Facility: CLINIC | Age: 33
End: 2023-12-08

## 2023-12-08 ENCOUNTER — TELEPHONE (OUTPATIENT)
Dept: OBGYN | Facility: CLINIC | Age: 33
End: 2023-12-08

## 2023-12-08 ENCOUNTER — PRENATAL OFFICE VISIT (OUTPATIENT)
Dept: MIDWIFE SERVICES | Facility: CLINIC | Age: 33
End: 2023-12-08
Payer: COMMERCIAL

## 2023-12-08 VITALS
BODY MASS INDEX: 30.94 KG/M2 | DIASTOLIC BLOOD PRESSURE: 78 MMHG | SYSTOLIC BLOOD PRESSURE: 134 MMHG | OXYGEN SATURATION: 98 % | WEIGHT: 194.6 LBS | HEART RATE: 126 BPM

## 2023-12-08 DIAGNOSIS — Z34.83 ENCOUNTER FOR SUPERVISION OF OTHER NORMAL PREGNANCY, THIRD TRIMESTER: Primary | ICD-10-CM

## 2023-12-08 DIAGNOSIS — Z98.891 HISTORY OF CESAREAN DELIVERY: ICD-10-CM

## 2023-12-08 DIAGNOSIS — Z01.818 PRE-OP EXAM: ICD-10-CM

## 2023-12-08 DIAGNOSIS — Z01.818 PRE-OP EXAM: Primary | ICD-10-CM

## 2023-12-08 LAB
ERYTHROCYTE [DISTWIDTH] IN BLOOD BY AUTOMATED COUNT: 12.7 % (ref 10–15)
HCT VFR BLD AUTO: 38 % (ref 35–47)
HGB BLD-MCNC: 12.6 G/DL (ref 11.7–15.7)
MCH RBC QN AUTO: 28.5 PG (ref 26.5–33)
MCHC RBC AUTO-ENTMCNC: 33.2 G/DL (ref 31.5–36.5)
MCV RBC AUTO: 86 FL (ref 78–100)
PLATELET # BLD AUTO: 208 10E3/UL (ref 150–450)
RBC # BLD AUTO: 4.42 10E6/UL (ref 3.8–5.2)
T PALLIDUM AB SER QL: NONREACTIVE
WBC # BLD AUTO: 9.8 10E3/UL (ref 4–11)

## 2023-12-08 PROCEDURE — 99207 PR PRENATAL VISIT: CPT | Performed by: ADVANCED PRACTICE MIDWIFE

## 2023-12-08 PROCEDURE — 36415 COLL VENOUS BLD VENIPUNCTURE: CPT | Performed by: ADVANCED PRACTICE MIDWIFE

## 2023-12-08 PROCEDURE — 86850 RBC ANTIBODY SCREEN: CPT | Performed by: ADVANCED PRACTICE MIDWIFE

## 2023-12-08 PROCEDURE — 86780 TREPONEMA PALLIDUM: CPT | Performed by: ADVANCED PRACTICE MIDWIFE

## 2023-12-08 PROCEDURE — 85027 COMPLETE CBC AUTOMATED: CPT | Performed by: ADVANCED PRACTICE MIDWIFE

## 2023-12-08 PROCEDURE — 87635 SARS-COV-2 COVID-19 AMP PRB: CPT | Performed by: ADVANCED PRACTICE MIDWIFE

## 2023-12-08 PROCEDURE — 86900 BLOOD TYPING SEROLOGIC ABO: CPT | Performed by: ADVANCED PRACTICE MIDWIFE

## 2023-12-08 PROCEDURE — 86901 BLOOD TYPING SEROLOGIC RH(D): CPT | Performed by: ADVANCED PRACTICE MIDWIFE

## 2023-12-08 NOTE — TELEPHONE ENCOUNTER
Type of surgery: ob  Location of surgery: [facility] OR  Date and time of surgery: 12/11/2023 10:30Am  Surgeon: Dr. Jamila Cherry  Pre-Op Appt Date: 12/8/2023  Post-Op Appt Date: 6 week, post partum   Packet sent out: Yes  Pre-cert/Authorization completed:  Not Applicable  Date: 12/8/2023

## 2023-12-08 NOTE — PROGRESS NOTES
Philip is here for her PNV.  Will proceed with a R C/S as scheduled on 12/11/23 since she has not had spontaneous labor and is term on that day.  No ctx.    Philip also desires to have a tubal ligation with the R C/S.  Reviewed that tubal ligation is a permanent birth control method and that if failure did occur it is more likely to be a tubal pregnancy so be aware of that if missed menses.    Tubal performed with direct observations of the tubes post initial uterine incision repaired for control of bleeding.   Gave copy of consent to the patient and will be faxed into media tab.       ASSESSMENT: 39w4d   R C/S on 12/11 with Dr Cherry.  PLAN: Permit for tubal signed.  CBC, Blood type and screen, Covid and surgical soap and instructions to patient.    Mukesh Garay APRN, CNM

## 2023-12-09 LAB — SARS-COV-2 RNA RESP QL NAA+PROBE: NEGATIVE

## 2023-12-10 NOTE — ANESTHESIA PREPROCEDURE EVALUATION
Anesthesia Pre-Procedure Evaluation    Patient: Philip Pfeiffer   MRN: [id number] : 1990        Procedure : Procedure(s):   section, tubal ligation, combined          Past Medical History:   Diagnosis Date    Varicose veins of lower extremity       Past Surgical History:   Procedure Laterality Date     SECTION N/A 2021    Procedure:  SECTION;  Surgeon: Jamila Cherry MD;  Location: [facility] L[facility]    ORTHOPEDIC SURGERY Right     Right 2nd and 3rd metatarsal ORIF age 14, with subsequent osteomyelitis    TONSILLECTOMY & ADENOIDECTOMY      wisdom teeth        Allergies   Allergen Reactions    Vancomycin Hives and Itching      Social History     Tobacco Use    Smoking status: Never    Smokeless tobacco: Never   Substance Use Topics    Alcohol use: Not Currently     Comment: about 4 glasses wine per week      Wt Readings from Last 1 Encounters:   23 88.3 kg (194 lb 9.6 oz)        Anesthesia Evaluation   Pt has had prior anesthetic. Type: Regional.    No history of anesthetic complications       ROS/MED HX  ENT/Pulmonary:  - neg pulmonary ROS     Neurologic:  - neg neurologic ROS   (+)    no peripheral neuropathy                            Cardiovascular:  - neg cardiovascular ROS     METS/Exercise Tolerance: >4 METS    Hematologic:  - neg hematologic  ROS  (-) anemia   Musculoskeletal:  - neg musculoskeletal ROS     GI/Hepatic:     (+) GERD, Asymptomatic on medication,                  Renal/Genitourinary:  - neg Renal ROS     Endo:  - neg endo ROS     Psychiatric/Substance Use:  - neg psychiatric ROS     Infectious Disease: Comment: Recent shingles exacerbation - T5 dermatome localized to the right side. At the time of this note, the patient has been treated with acyclovir x 1 week and the lesions which are present are dry and flaking. There are no open ulcers.      Malignancy:  - neg malignancy ROS     Other: Comment:  @ 40w0d  , 40w0d   (+) Possibly pregnant, ,  ",previous  (Hx of stat C/S placental abruption)         Physical Exam    Airway        Mallampati: I   TM distance: > 3 FB   Neck ROM: full   Mouth opening: > 3 cm    Respiratory Devices and Support         Dental       (+) Minor Abnormalities - some fillings, tiny chips      Cardiovascular   cardiovascular exam normal       Rhythm and rate: regular and normal     Pulmonary   pulmonary exam normal        breath sounds clear to auscultation           OUTSIDE LABS:  CBC:   Lab Results   Component Value Date    WBC 9.8 2023    WBC 6.7 2023    HGB 12.6 2023    HGB 11.8 11/15/2023    HCT 38.0 2023    HCT 36.4 2023     2023     2023     BMP: No results found for: \"NA\", \"POTASSIUM\", \"CHLORIDE\", \"CO2\", \"BUN\", \"CR\", \"GLC\"  COAGS: No results found for: \"PTT\", \"INR\", \"FIBR\"  POC:   Lab Results   Component Value Date    HCG Negative 2021     HEPATIC: No results found for: \"ALBUMIN\", \"PROTTOTAL\", \"ALT\", \"AST\", \"GGT\", \"ALKPHOS\", \"BILITOTAL\", \"BILIDIRECT\", \"MADELIN\"  OTHER:   Lab Results   Component Value Date    A1C 5.8 (H) 2020       Anesthesia Plan    ASA Status:  2    NPO Status:  ELEVATED Aspiration Risk/Unknown (NPO appropriate)    Anesthesia Type: Spinal.   Induction: N/a.   Maintenance: N/A.        Consents    Anesthesia Plan(s) and associated risks, benefits, and realistic alternatives discussed. Questions answered and patient/representative(s) expressed understanding.     - Discussed: Risks, Benefits and Alternatives for BOTH SEDATION and the PROCEDURE were discussed     - Discussed with:  Patient      - Extended Intubation/Ventilatory Support Discussed: No.      - Patient is DNR/DNI Status: No     Use of blood products discussed: Yes.     - Discussed with: Patient.     - Consented: consented to blood products     Postoperative Care    Pain management: Multi-modal analgesia, intrathecal morphine, IV analgesics, Oral pain medications, Peripheral " nerve block (Single Shot), Neuraxial analgesia.   PONV prophylaxis: Ondansetron (or other 5HT-3), Dexamethasone or Solumedrol     Comments:    Other Comments: 34 yo  at 40w0d presenting for repeat  delivery and tubal ligation, prev x 1 - emergent 2/2 to placental abruption. POBHx notable for placental abruption with G1, otherwise negative during this pregnancy. Of note, patient had a recent (1-2 weeks ago) exacerbation of shingles, treated with acyclovir x 1 week, and lesions in the dermatomal distribution of T5 on the right side. Currently, the lesions are dry and flaking, there are no open ulcers. Plan spinal and standard ASA monitors.                I have reviewed the pertinent notes and labs in the chart from the past 30 days and (re)examined the patient.  Any updates or changes from those notes are reflected in this note.

## 2023-12-11 ENCOUNTER — ANESTHESIA (OUTPATIENT)
Dept: OBGYN | Facility: CLINIC | Age: 33
End: 2023-12-11
Payer: COMMERCIAL

## 2023-12-11 ENCOUNTER — HOSPITAL ENCOUNTER (INPATIENT)
Facility: CLINIC | Age: 33
LOS: 2 days | Discharge: HOME | End: 2023-12-13
Attending: OBSTETRICS & GYNECOLOGY | Admitting: OBSTETRICS & GYNECOLOGY
Payer: COMMERCIAL

## 2023-12-11 DIAGNOSIS — Z98.891 S/P CESAREAN SECTION: ICD-10-CM

## 2023-12-11 PROCEDURE — 250N000011 HC RX IP 250 OP 636: Performed by: OBSTETRICS & GYNECOLOGY

## 2023-12-11 PROCEDURE — 271N000001 HC OR GENERAL SUPPLY NON-STERILE: Performed by: OBSTETRICS & GYNECOLOGY

## 2023-12-11 PROCEDURE — 120N000002 HC R&B MED SURG/OB UMMC

## 2023-12-11 PROCEDURE — 250N000011 HC RX IP 250 OP 636: Performed by: STUDENT IN AN ORGANIZED HEALTH CARE EDUCATION/TRAINING PROGRAM

## 2023-12-11 PROCEDURE — 250N000011 HC RX IP 250 OP 636

## 2023-12-11 PROCEDURE — 88302 TISSUE EXAM BY PATHOLOGIST: CPT | Mod: 26 | Performed by: PATHOLOGY

## 2023-12-11 PROCEDURE — 272N000001 HC OR GENERAL SUPPLY STERILE: Performed by: OBSTETRICS & GYNECOLOGY

## 2023-12-11 PROCEDURE — 250N000011 HC RX IP 250 OP 636: Mod: JZ

## 2023-12-11 PROCEDURE — 58611 LIGATE OVIDUCT(S) ADD-ON: CPT | Mod: GC | Performed by: OBSTETRICS & GYNECOLOGY

## 2023-12-11 PROCEDURE — 258N000003 HC RX IP 258 OP 636: Performed by: OBSTETRICS & GYNECOLOGY

## 2023-12-11 PROCEDURE — 710N000010 HC RECOVERY PHASE 1, LEVEL 2, PER MIN: Performed by: OBSTETRICS & GYNECOLOGY

## 2023-12-11 PROCEDURE — 0UB70ZZ EXCISION OF BILATERAL FALLOPIAN TUBES, OPEN APPROACH: ICD-10-PCS | Performed by: OBSTETRICS & GYNECOLOGY

## 2023-12-11 PROCEDURE — 250N000013 HC RX MED GY IP 250 OP 250 PS 637: Performed by: OBSTETRICS & GYNECOLOGY

## 2023-12-11 PROCEDURE — 258N000003 HC RX IP 258 OP 636

## 2023-12-11 PROCEDURE — C9290 INJ, BUPIVACAINE LIPOSOME: HCPCS | Performed by: STUDENT IN AN ORGANIZED HEALTH CARE EDUCATION/TRAINING PROGRAM

## 2023-12-11 PROCEDURE — 999N000141 HC STATISTIC PRE-PROCEDURE NURSING ASSESSMENT: Performed by: OBSTETRICS & GYNECOLOGY

## 2023-12-11 PROCEDURE — 250N000013 HC RX MED GY IP 250 OP 250 PS 637

## 2023-12-11 PROCEDURE — 370N000017 HC ANESTHESIA TECHNICAL FEE, PER MIN: Performed by: OBSTETRICS & GYNECOLOGY

## 2023-12-11 PROCEDURE — 250N000009 HC RX 250

## 2023-12-11 PROCEDURE — 59510 CESAREAN DELIVERY: CPT | Mod: GC | Performed by: OBSTETRICS & GYNECOLOGY

## 2023-12-11 PROCEDURE — 360N000076 HC SURGERY LEVEL 3, PER MIN: Performed by: OBSTETRICS & GYNECOLOGY

## 2023-12-11 PROCEDURE — 88302 TISSUE EXAM BY PATHOLOGIST: CPT | Mod: TC

## 2023-12-11 RX ORDER — BISACODYL 10 MG
10 SUPPOSITORY, RECTAL RECTAL DAILY PRN
Status: DISCONTINUED | OUTPATIENT
Start: 2023-12-13 | End: 2023-12-13 | Stop reason: HOSPADM

## 2023-12-11 RX ORDER — SODIUM CHLORIDE, SODIUM LACTATE, POTASSIUM CHLORIDE, CALCIUM CHLORIDE 600; 310; 30; 20 MG/100ML; MG/100ML; MG/100ML; MG/100ML
INJECTION, SOLUTION INTRAVENOUS CONTINUOUS PRN
Status: DISCONTINUED | OUTPATIENT
Start: 2023-12-11 | End: 2023-12-11

## 2023-12-11 RX ORDER — CEFAZOLIN SODIUM/WATER 2 G/20 ML
2 SYRINGE (ML) INTRAVENOUS
Status: COMPLETED | OUTPATIENT
Start: 2023-12-11 | End: 2023-12-11

## 2023-12-11 RX ORDER — PROCHLORPERAZINE 25 MG
25 SUPPOSITORY, RECTAL RECTAL EVERY 12 HOURS PRN
Status: DISCONTINUED | OUTPATIENT
Start: 2023-12-11 | End: 2023-12-13 | Stop reason: HOSPADM

## 2023-12-11 RX ORDER — LABETALOL HYDROCHLORIDE 5 MG/ML
10 INJECTION, SOLUTION INTRAVENOUS
Status: DISCONTINUED | OUTPATIENT
Start: 2023-12-11 | End: 2023-12-11 | Stop reason: HOSPADM

## 2023-12-11 RX ORDER — AMOXICILLIN 250 MG
2 CAPSULE ORAL 2 TIMES DAILY
Status: DISCONTINUED | OUTPATIENT
Start: 2023-12-11 | End: 2023-12-13 | Stop reason: HOSPADM

## 2023-12-11 RX ORDER — DEXAMETHASONE SODIUM PHOSPHATE 4 MG/ML
INJECTION, SOLUTION INTRA-ARTICULAR; INTRALESIONAL; INTRAMUSCULAR; INTRAVENOUS; SOFT TISSUE PRN
Status: DISCONTINUED | OUTPATIENT
Start: 2023-12-11 | End: 2023-12-11

## 2023-12-11 RX ORDER — METOCLOPRAMIDE HYDROCHLORIDE 5 MG/ML
10 INJECTION INTRAMUSCULAR; INTRAVENOUS EVERY 6 HOURS PRN
Status: DISCONTINUED | OUTPATIENT
Start: 2023-12-11 | End: 2023-12-13 | Stop reason: HOSPADM

## 2023-12-11 RX ORDER — MORPHINE SULFATE 1 MG/ML
INJECTION, SOLUTION EPIDURAL; INTRATHECAL; INTRAVENOUS
Status: COMPLETED | OUTPATIENT
Start: 2023-12-11 | End: 2023-12-11

## 2023-12-11 RX ORDER — OXYTOCIN/0.9 % SODIUM CHLORIDE 30/500 ML
340 PLASTIC BAG, INJECTION (ML) INTRAVENOUS CONTINUOUS PRN
Status: DISCONTINUED | OUTPATIENT
Start: 2023-12-11 | End: 2023-12-11 | Stop reason: HOSPADM

## 2023-12-11 RX ORDER — LIDOCAINE 40 MG/G
CREAM TOPICAL
Status: DISCONTINUED | OUTPATIENT
Start: 2023-12-11 | End: 2023-12-11 | Stop reason: HOSPADM

## 2023-12-11 RX ORDER — CITRIC ACID/SODIUM CITRATE 334-500MG
30 SOLUTION, ORAL ORAL
Status: DISCONTINUED | OUTPATIENT
Start: 2023-12-11 | End: 2023-12-11 | Stop reason: HOSPADM

## 2023-12-11 RX ORDER — NALOXONE HYDROCHLORIDE 0.4 MG/ML
0.4 INJECTION, SOLUTION INTRAMUSCULAR; INTRAVENOUS; SUBCUTANEOUS
Status: DISCONTINUED | OUTPATIENT
Start: 2023-12-11 | End: 2023-12-11

## 2023-12-11 RX ORDER — ONDANSETRON 4 MG/1
4 TABLET, ORALLY DISINTEGRATING ORAL EVERY 30 MIN PRN
Status: DISCONTINUED | OUTPATIENT
Start: 2023-12-11 | End: 2023-12-11 | Stop reason: HOSPADM

## 2023-12-11 RX ORDER — METHYLERGONOVINE MALEATE 0.2 MG/ML
200 INJECTION INTRAVENOUS
Status: DISCONTINUED | OUTPATIENT
Start: 2023-12-11 | End: 2023-12-13 | Stop reason: HOSPADM

## 2023-12-11 RX ORDER — MORPHINE SULFATE 1 MG/ML
150 INJECTION, SOLUTION EPIDURAL; INTRATHECAL; INTRAVENOUS ONCE
Status: DISCONTINUED | OUTPATIENT
Start: 2023-12-11 | End: 2023-12-11

## 2023-12-11 RX ORDER — LIDOCAINE 40 MG/G
CREAM TOPICAL
Status: DISCONTINUED | OUTPATIENT
Start: 2023-12-11 | End: 2023-12-13 | Stop reason: HOSPADM

## 2023-12-11 RX ORDER — BUPIVACAINE HYDROCHLORIDE 7.5 MG/ML
INJECTION, SOLUTION INTRASPINAL
Status: COMPLETED | OUTPATIENT
Start: 2023-12-11 | End: 2023-12-11

## 2023-12-11 RX ORDER — MODIFIED LANOLIN
OINTMENT (GRAM) TOPICAL
Status: DISCONTINUED | OUTPATIENT
Start: 2023-12-11 | End: 2023-12-13 | Stop reason: HOSPADM

## 2023-12-11 RX ORDER — CARBOPROST TROMETHAMINE 250 UG/ML
250 INJECTION, SOLUTION INTRAMUSCULAR
Status: DISCONTINUED | OUTPATIENT
Start: 2023-12-11 | End: 2023-12-13 | Stop reason: HOSPADM

## 2023-12-11 RX ORDER — CEFAZOLIN SODIUM/WATER 2 G/20 ML
2 SYRINGE (ML) INTRAVENOUS SEE ADMIN INSTRUCTIONS
Status: DISCONTINUED | OUTPATIENT
Start: 2023-12-11 | End: 2023-12-11 | Stop reason: HOSPADM

## 2023-12-11 RX ORDER — METOCLOPRAMIDE 10 MG/1
10 TABLET ORAL EVERY 6 HOURS PRN
Status: DISCONTINUED | OUTPATIENT
Start: 2023-12-11 | End: 2023-12-13 | Stop reason: HOSPADM

## 2023-12-11 RX ORDER — TRANEXAMIC ACID 10 MG/ML
1 INJECTION, SOLUTION INTRAVENOUS EVERY 30 MIN PRN
Status: DISCONTINUED | OUTPATIENT
Start: 2023-12-11 | End: 2023-12-11 | Stop reason: HOSPADM

## 2023-12-11 RX ORDER — BUPIVACAINE HYDROCHLORIDE 2.5 MG/ML
INJECTION, SOLUTION EPIDURAL; INFILTRATION; INTRACAUDAL
Status: DISCONTINUED | OUTPATIENT
Start: 2023-12-11 | End: 2023-12-11

## 2023-12-11 RX ORDER — NALOXONE HYDROCHLORIDE 0.4 MG/ML
0.2 INJECTION, SOLUTION INTRAMUSCULAR; INTRAVENOUS; SUBCUTANEOUS
Status: DISCONTINUED | OUTPATIENT
Start: 2023-12-11 | End: 2023-12-13 | Stop reason: HOSPADM

## 2023-12-11 RX ORDER — MISOPROSTOL 200 UG/1
400 TABLET ORAL
Status: DISCONTINUED | OUTPATIENT
Start: 2023-12-11 | End: 2023-12-11 | Stop reason: HOSPADM

## 2023-12-11 RX ORDER — OXYTOCIN/0.9 % SODIUM CHLORIDE 30/500 ML
PLASTIC BAG, INJECTION (ML) INTRAVENOUS CONTINUOUS PRN
Status: DISCONTINUED | OUTPATIENT
Start: 2023-12-11 | End: 2023-12-11

## 2023-12-11 RX ORDER — OXYTOCIN 10 [USP'U]/ML
10 INJECTION, SOLUTION INTRAMUSCULAR; INTRAVENOUS
Status: DISCONTINUED | OUTPATIENT
Start: 2023-12-11 | End: 2023-12-11

## 2023-12-11 RX ORDER — FAMOTIDINE 10 MG
20 TABLET ORAL 2 TIMES DAILY
Status: DISCONTINUED | OUTPATIENT
Start: 2023-12-11 | End: 2023-12-13 | Stop reason: HOSPADM

## 2023-12-11 RX ORDER — OXYTOCIN/0.9 % SODIUM CHLORIDE 30/500 ML
340 PLASTIC BAG, INJECTION (ML) INTRAVENOUS CONTINUOUS PRN
Status: DISCONTINUED | OUTPATIENT
Start: 2023-12-11 | End: 2023-12-13 | Stop reason: HOSPADM

## 2023-12-11 RX ORDER — ONDANSETRON 2 MG/ML
INJECTION INTRAMUSCULAR; INTRAVENOUS PRN
Status: DISCONTINUED | OUTPATIENT
Start: 2023-12-11 | End: 2023-12-11

## 2023-12-11 RX ORDER — NALBUPHINE HYDROCHLORIDE 20 MG/ML
2.5-5 INJECTION, SOLUTION INTRAMUSCULAR; INTRAVENOUS; SUBCUTANEOUS EVERY 6 HOURS PRN
Status: DISCONTINUED | OUTPATIENT
Start: 2023-12-11 | End: 2023-12-11

## 2023-12-11 RX ORDER — SIMETHICONE 80 MG
80 TABLET,CHEWABLE ORAL 4 TIMES DAILY PRN
Status: DISCONTINUED | OUTPATIENT
Start: 2023-12-11 | End: 2023-12-13 | Stop reason: HOSPADM

## 2023-12-11 RX ORDER — FENTANYL CITRATE-0.9 % NACL/PF 10 MCG/ML
100 PLASTIC BAG, INJECTION (ML) INTRAVENOUS EVERY 5 MIN PRN
Status: DISCONTINUED | OUTPATIENT
Start: 2023-12-11 | End: 2023-12-11

## 2023-12-11 RX ORDER — ACETAMINOPHEN 325 MG/1
975 TABLET ORAL EVERY 6 HOURS
Status: DISCONTINUED | OUTPATIENT
Start: 2023-12-11 | End: 2023-12-13 | Stop reason: HOSPADM

## 2023-12-11 RX ORDER — DEXTROSE, SODIUM CHLORIDE, SODIUM LACTATE, POTASSIUM CHLORIDE, AND CALCIUM CHLORIDE 5; .6; .31; .03; .02 G/100ML; G/100ML; G/100ML; G/100ML; G/100ML
INJECTION, SOLUTION INTRAVENOUS CONTINUOUS
Status: DISCONTINUED | OUTPATIENT
Start: 2023-12-11 | End: 2023-12-13 | Stop reason: HOSPADM

## 2023-12-11 RX ORDER — ONDANSETRON 2 MG/ML
4 INJECTION INTRAMUSCULAR; INTRAVENOUS EVERY 6 HOURS PRN
Status: DISCONTINUED | OUTPATIENT
Start: 2023-12-11 | End: 2023-12-13 | Stop reason: HOSPADM

## 2023-12-11 RX ORDER — TRANEXAMIC ACID 10 MG/ML
1 INJECTION, SOLUTION INTRAVENOUS EVERY 30 MIN PRN
Status: DISCONTINUED | OUTPATIENT
Start: 2023-12-11 | End: 2023-12-13 | Stop reason: HOSPADM

## 2023-12-11 RX ORDER — AMOXICILLIN 250 MG
1 CAPSULE ORAL 2 TIMES DAILY
Status: DISCONTINUED | OUTPATIENT
Start: 2023-12-11 | End: 2023-12-13 | Stop reason: HOSPADM

## 2023-12-11 RX ORDER — HYDRALAZINE HYDROCHLORIDE 20 MG/ML
2.5-5 INJECTION INTRAMUSCULAR; INTRAVENOUS EVERY 10 MIN PRN
Status: DISCONTINUED | OUTPATIENT
Start: 2023-12-11 | End: 2023-12-11 | Stop reason: HOSPADM

## 2023-12-11 RX ORDER — PROCHLORPERAZINE MALEATE 10 MG
10 TABLET ORAL EVERY 6 HOURS PRN
Status: DISCONTINUED | OUTPATIENT
Start: 2023-12-11 | End: 2023-12-13 | Stop reason: HOSPADM

## 2023-12-11 RX ORDER — NALOXONE HYDROCHLORIDE 0.4 MG/ML
0.2 INJECTION, SOLUTION INTRAMUSCULAR; INTRAVENOUS; SUBCUTANEOUS
Status: DISCONTINUED | OUTPATIENT
Start: 2023-12-11 | End: 2023-12-11

## 2023-12-11 RX ORDER — MISOPROSTOL 200 UG/1
800 TABLET ORAL
Status: DISCONTINUED | OUTPATIENT
Start: 2023-12-11 | End: 2023-12-11 | Stop reason: HOSPADM

## 2023-12-11 RX ORDER — FENTANYL CITRATE 50 UG/ML
INJECTION, SOLUTION INTRAMUSCULAR; INTRAVENOUS
Status: COMPLETED | OUTPATIENT
Start: 2023-12-11 | End: 2023-12-11

## 2023-12-11 RX ORDER — ONDANSETRON 4 MG/1
4 TABLET, ORALLY DISINTEGRATING ORAL EVERY 6 HOURS PRN
Status: DISCONTINUED | OUTPATIENT
Start: 2023-12-11 | End: 2023-12-13 | Stop reason: HOSPADM

## 2023-12-11 RX ORDER — ACETAMINOPHEN 325 MG/1
975 TABLET ORAL ONCE
Status: COMPLETED | OUTPATIENT
Start: 2023-12-11 | End: 2023-12-11

## 2023-12-11 RX ORDER — CITRIC ACID/SODIUM CITRATE 334-500MG
30 SOLUTION, ORAL ORAL ONCE
Status: COMPLETED | OUTPATIENT
Start: 2023-12-11 | End: 2023-12-11

## 2023-12-11 RX ORDER — HYDROCORTISONE 25 MG/G
CREAM TOPICAL 3 TIMES DAILY PRN
Status: DISCONTINUED | OUTPATIENT
Start: 2023-12-11 | End: 2023-12-13 | Stop reason: HOSPADM

## 2023-12-11 RX ORDER — NALOXONE HYDROCHLORIDE 0.4 MG/ML
0.4 INJECTION, SOLUTION INTRAMUSCULAR; INTRAVENOUS; SUBCUTANEOUS
Status: DISCONTINUED | OUTPATIENT
Start: 2023-12-11 | End: 2023-12-13 | Stop reason: HOSPADM

## 2023-12-11 RX ORDER — METHYLERGONOVINE MALEATE 0.2 MG/ML
200 INJECTION INTRAVENOUS
Status: DISCONTINUED | OUTPATIENT
Start: 2023-12-11 | End: 2023-12-11 | Stop reason: HOSPADM

## 2023-12-11 RX ORDER — FENTANYL CITRATE 50 UG/ML
15 INJECTION, SOLUTION INTRAMUSCULAR; INTRAVENOUS ONCE
Status: DISCONTINUED | OUTPATIENT
Start: 2023-12-11 | End: 2023-12-11

## 2023-12-11 RX ORDER — SODIUM CHLORIDE, SODIUM LACTATE, POTASSIUM CHLORIDE, CALCIUM CHLORIDE 600; 310; 30; 20 MG/100ML; MG/100ML; MG/100ML; MG/100ML
INJECTION, SOLUTION INTRAVENOUS CONTINUOUS
Status: DISCONTINUED | OUTPATIENT
Start: 2023-12-11 | End: 2023-12-11 | Stop reason: HOSPADM

## 2023-12-11 RX ORDER — BUPIVACAINE HYDROCHLORIDE 7.5 MG/ML
1.6 INJECTION, SOLUTION EPIDURAL; RETROBULBAR ONCE
Status: DISCONTINUED | OUTPATIENT
Start: 2023-12-11 | End: 2023-12-11

## 2023-12-11 RX ORDER — ONDANSETRON 2 MG/ML
4 INJECTION INTRAMUSCULAR; INTRAVENOUS EVERY 30 MIN PRN
Status: DISCONTINUED | OUTPATIENT
Start: 2023-12-11 | End: 2023-12-11 | Stop reason: HOSPADM

## 2023-12-11 RX ORDER — IBUPROFEN 800 MG/1
800 TABLET, FILM COATED ORAL EVERY 6 HOURS
Status: DISCONTINUED | OUTPATIENT
Start: 2023-12-12 | End: 2023-12-13 | Stop reason: HOSPADM

## 2023-12-11 RX ORDER — OXYTOCIN/0.9 % SODIUM CHLORIDE 30/500 ML
100-340 PLASTIC BAG, INJECTION (ML) INTRAVENOUS CONTINUOUS PRN
Status: DISCONTINUED | OUTPATIENT
Start: 2023-12-11 | End: 2023-12-11

## 2023-12-11 RX ORDER — KETOROLAC TROMETHAMINE 30 MG/ML
INJECTION, SOLUTION INTRAMUSCULAR; INTRAVENOUS PRN
Status: DISCONTINUED | OUTPATIENT
Start: 2023-12-11 | End: 2023-12-11

## 2023-12-11 RX ORDER — MISOPROSTOL 200 UG/1
800 TABLET ORAL
Status: DISCONTINUED | OUTPATIENT
Start: 2023-12-11 | End: 2023-12-13 | Stop reason: HOSPADM

## 2023-12-11 RX ORDER — CARBOPROST TROMETHAMINE 250 UG/ML
250 INJECTION, SOLUTION INTRAMUSCULAR
Status: DISCONTINUED | OUTPATIENT
Start: 2023-12-11 | End: 2023-12-11 | Stop reason: HOSPADM

## 2023-12-11 RX ORDER — OXYCODONE HYDROCHLORIDE 5 MG/1
5 TABLET ORAL EVERY 4 HOURS PRN
Status: DISCONTINUED | OUTPATIENT
Start: 2023-12-11 | End: 2023-12-13 | Stop reason: HOSPADM

## 2023-12-11 RX ORDER — KETOROLAC TROMETHAMINE 30 MG/ML
30 INJECTION, SOLUTION INTRAMUSCULAR; INTRAVENOUS EVERY 6 HOURS
Status: COMPLETED | OUTPATIENT
Start: 2023-12-11 | End: 2023-12-12

## 2023-12-11 RX ORDER — OXYTOCIN 10 [USP'U]/ML
10 INJECTION, SOLUTION INTRAMUSCULAR; INTRAVENOUS
Status: DISCONTINUED | OUTPATIENT
Start: 2023-12-11 | End: 2023-12-13 | Stop reason: HOSPADM

## 2023-12-11 RX ORDER — MISOPROSTOL 200 UG/1
400 TABLET ORAL
Status: DISCONTINUED | OUTPATIENT
Start: 2023-12-11 | End: 2023-12-13 | Stop reason: HOSPADM

## 2023-12-11 RX ORDER — OXYTOCIN 10 [USP'U]/ML
10 INJECTION, SOLUTION INTRAMUSCULAR; INTRAVENOUS
Status: DISCONTINUED | OUTPATIENT
Start: 2023-12-11 | End: 2023-12-11 | Stop reason: HOSPADM

## 2023-12-11 RX ADMIN — SODIUM CHLORIDE, POTASSIUM CHLORIDE, SODIUM LACTATE AND CALCIUM CHLORIDE 500 ML: 600; 310; 30; 20 INJECTION, SOLUTION INTRAVENOUS at 09:03

## 2023-12-11 RX ADMIN — SODIUM CHLORIDE, POTASSIUM CHLORIDE, SODIUM LACTATE AND CALCIUM CHLORIDE: 600; 310; 30; 20 INJECTION, SOLUTION INTRAVENOUS at 10:48

## 2023-12-11 RX ADMIN — FENTANYL CITRATE 15 MCG: 50 INJECTION INTRAMUSCULAR; INTRAVENOUS at 10:32

## 2023-12-11 RX ADMIN — BUPIVACAINE HYDROCHLORIDE 20 ML: 2.5 INJECTION, SOLUTION EPIDURAL; INFILTRATION; INTRACAUDAL at 11:48

## 2023-12-11 RX ADMIN — ACETAMINOPHEN 975 MG: 325 TABLET, FILM COATED ORAL at 08:47

## 2023-12-11 RX ADMIN — MORPHINE SULFATE 0.15 MG: 1 INJECTION EPIDURAL; INTRATHECAL; INTRAVENOUS at 10:32

## 2023-12-11 RX ADMIN — DOCUSATE SODIUM AND SENNOSIDES 1 TABLET: 8.6; 5 TABLET, FILM COATED ORAL at 20:31

## 2023-12-11 RX ADMIN — OXYCODONE HYDROCHLORIDE 5 MG: 5 TABLET ORAL at 14:49

## 2023-12-11 RX ADMIN — SIMETHICONE 80 MG: 80 TABLET, CHEWABLE ORAL at 14:49

## 2023-12-11 RX ADMIN — ACETAMINOPHEN 975 MG: 325 TABLET, FILM COATED ORAL at 20:26

## 2023-12-11 RX ADMIN — PHENYLEPHRINE HYDROCHLORIDE 50 MCG/MIN: 10 INJECTION INTRAVENOUS at 10:32

## 2023-12-11 RX ADMIN — FAMOTIDINE 20 MG: 10 INJECTION, SOLUTION INTRAVENOUS at 09:58

## 2023-12-11 RX ADMIN — DEXAMETHASONE SODIUM PHOSPHATE 8 MG: 4 INJECTION, SOLUTION INTRA-ARTICULAR; INTRALESIONAL; INTRAMUSCULAR; INTRAVENOUS; SOFT TISSUE at 10:58

## 2023-12-11 RX ADMIN — Medication 300 ML/HR: at 10:52

## 2023-12-11 RX ADMIN — SODIUM CHLORIDE, POTASSIUM CHLORIDE, SODIUM LACTATE AND CALCIUM CHLORIDE: 600; 310; 30; 20 INJECTION, SOLUTION INTRAVENOUS at 09:45

## 2023-12-11 RX ADMIN — KETOROLAC TROMETHAMINE 30 MG: 30 INJECTION, SOLUTION INTRAMUSCULAR; INTRAVENOUS at 18:17

## 2023-12-11 RX ADMIN — OXYCODONE HYDROCHLORIDE 5 MG: 5 TABLET ORAL at 20:26

## 2023-12-11 RX ADMIN — SODIUM CITRATE AND CITRIC ACID MONOHYDRATE 30 ML: 500; 334 SOLUTION ORAL at 10:13

## 2023-12-11 RX ADMIN — KETOROLAC TROMETHAMINE 30 MG: 30 INJECTION, SOLUTION INTRAMUSCULAR at 11:28

## 2023-12-11 RX ADMIN — ONDANSETRON 4 MG: 2 INJECTION INTRAMUSCULAR; INTRAVENOUS at 10:17

## 2023-12-11 RX ADMIN — BUPIVACAINE HYDROCHLORIDE IN DEXTROSE 1.6 ML: 7.5 INJECTION, SOLUTION SUBARACHNOID at 10:32

## 2023-12-11 RX ADMIN — ACETAMINOPHEN 975 MG: 325 TABLET, FILM COATED ORAL at 14:49

## 2023-12-11 RX ADMIN — Medication 2 G: at 10:33

## 2023-12-11 RX ADMIN — BUPIVACAINE 20 ML: 13.3 INJECTION, SUSPENSION, LIPOSOMAL INFILTRATION at 11:48

## 2023-12-11 ASSESSMENT — ACTIVITIES OF DAILY LIVING (ADL)
ADLS_ACUITY_SCORE: 18
ADLS_ACUITY_SCORE: 35
ADLS_ACUITY_SCORE: 18

## 2023-12-11 NOTE — OR NURSING
Data: Philip Pfeiffer transferred to [id number] via zoomcart at 1328. Baby transferred via parent's arms.  Action: Receiving unit notified of transfer: Yes. Patient and family notified of room change. Report given to VASQUEZ Jenkins RN at 1340. Belongings sent to receiving unit. Accompanied by Registered Nurse. Oriented patient to surroundings. Call light within reach. ID bands double-checked with receiving RN.  Response: Patient tolerated transfer and is stable.

## 2023-12-11 NOTE — ANESTHESIA CARE TRANSFER NOTE
Patient: Philip Pfeiffer    Procedure: Procedure(s):   section, tubal ligation, combined       Diagnosis: Previous  delivery, antepartum condition or complication [O34.219]  Diagnosis Additional Information: No value filed.    Anesthesia Type:   Spinal     Note:    Oropharynx: oropharynx clear of all foreign objects and spontaneously breathing  Level of Consciousness: awake  Oxygen Supplementation: room air    Independent Airway: airway patency satisfactory and stable  Dentition: dentition unchanged  Vital Signs Stable: post-procedure vital signs reviewed and stable  Report to RN Given: handoff report given  Patient transferred to: PACU    Handoff Report: Identifed the Patient, Identified the Reponsible Provider, Reviewed the pertinent medical history, Discussed the surgical course, Reviewed Intra-OP anesthesia mangement and issues during anesthesia, Set expectations for post-procedure period and Allowed opportunity for questions and acknowledgement of understanding      Vitals:  Vitals Value Taken Time   /78 23 1202   Temp     Pulse 86 23 1206   Resp 17 23 1206   SpO2 97 % 23 1206   Vitals shown include unfiled device data.    Electronically Signed By: Pollo Street MD  2023  12:07 PM

## 2023-12-11 NOTE — BRIEF OP NOTE
[facility]  Brief Operative Note    Surgery Date:  2023  Surgeon(s): Jamila Cherry MD  Assistants:  Sundar Hernández DO, MA    Preoperative Diagnoses:  -  at 40w0d  -  History of  section x1    Postoperative diagnoses:  -  at 40w0d, now delivered    Procedure performed:  Repeat low segment transverse  section via pfannenstiel skin incision with 1 layer uterine closure, bilateral salpingectomy    Anesthesia:  Spinal   Est Blood Loss (mL): 354 mL  Fluid replacement: 1500 mL crystalloid  Urine Output: 200 mL  Specimens: bilateral fallopian tubes  Additional intraoperative medications: none  Complications: None apparent    Operative findings:   - Single, liveborn on 2023. Apgars pending at one and five minutes.  Birth weight: pending .  Fetal presentation: SHANNA. Amniotic fluid: clear.    - Placenta intact with 3 vessel cord.     - Normal appearing uterus, fallopian tubes, ovaries.   -  No intraabdominal adhesions.  No abdominal wall adhesions  - bilateral tubes ligated and removed, hemostatic at end of case    Disposition: Transferred in stable condition to the PACU    Sundar Hernández DO, MA  [facility]- PGY2  11:38 AM 2023

## 2023-12-11 NOTE — H&P
History and Physical     Philip Pfeiffer MRN# [id number]   YOB: 1990 Age: 33 year old      Date of Admission: (Not on file)    Primary care provider: Queenie Zurita      Assessment and Plan:       33 year old  at 40w0d by 8w3d US, here for Scheduled C-sectionn. Pregnancy is notable for Hx of stat C/s for placental abruption.     #Scheduled Repeat  Section with BTL  Indicated due to post dates and prior  section. Prior (s) for placental abruption. Previous op notes otherwise normal. Will plan for a pfannenstiel skin incision with low transverse hysterotomy  - Labs: CBC, T&S, RPR   - Pre-op Hgb 12.6, Plts 208   - T&S: A pos   - Placenta: Anterior   - Anesthesia: Spinal   - 2g Ancef   - PPH Meds/Ppx: no contraindications  - Diet: NPO  - PPx: SCDs  - Consent: Discussed risks and benefits of procedure, including but not limited to bleeding, infection, injury to surrounding organs, injury to infant, and the potential need for another surgery should some injury go unrecognized or patient were to have continued bleeding. Patient had time to ask questions and expressed understanding of procedure and associated risks. Agreed to blood transfusion if necessary . Consent signed . Patient additionally consented for tubal ligation.      # PNC  - Rh positive, Rubella immune, GCT wnl, GBS negative  - Other prenatal labs wnl  - s/p flu, Tdap vaccines  - Pap UTD  - Contraception: BTL, FTP signed on , private insurance     # FWB:   Cat 1 tracing, reactive; EFW 53%  - Continuous Fetal Monitoring  - Intrauterine resuscitative measures prn      Patient discussed with Dr. Dilip LAIN OBGYN- PGY2  9:21 AM 2023                     HPI:     Philip Pfeiffer is a 33 year old  at 40w0d by LMP c/w 8w3d US who presents today for scheduled repeat  section.    Pregnancy notable for:   - Hx of stat CS for placental abruption    Her previous   delivery was notable for placental abruption. Denies history of postpartum hemorrhage, shoulder dystocia, pre-eclampsia. No history of asthma or high blood pressure.    She reports good fetal movement. Denies LOF, vaginal bleeding, or contractions .  She denies fever, chills, SOB, chest pain, palpitations, N/V, LE swelling/tenderness.  No concerns for headache, vision changes, RUQ or epigastric pain      Patient has been NPO since midnight    OB History:    OB History    Para Term  AB Living   2 1 1 0 0 1   SAB IAB Ectopic Multiple Live Births   0 0 0 0 1      # Outcome Date GA Lbr Nikolas/2nd Weight Sex Delivery Anes PTL Lv   2 Current            1 Term 21 41w0d   F -SEC   JIMMY      Complications: Abruptio Placenta      Name: DONG MOODY      Apgar1: 8  Apgar5: 8        Prenatal Lab Results:  Lab Results   Component Value Date    ABO A 2021    RH Pos 2021    AS Negative 2023    HEPBANG Nonreactive 2023    CHPCRT Negative 2023    GCPCRT Negative 2023    RUBELLAABIGG Immune 2021    HGB 12.6 2023       GBS Status:   Lab Results   Component Value Date    GBS Negative 2021                Past Medical History:     Past Medical History:   Diagnosis Date    Varicose veins of lower extremity              Past Surgical History:     Past Surgical History:   Procedure Laterality Date     SECTION N/A 2021    Procedure:  SECTION;  Surgeon: Jamila Cherry MD;  Location: [facility] L[facility]    ORTHOPEDIC SURGERY Right     Right 2nd and 3rd metatarsal ORIF age 14, with subsequent osteomyelitis    TONSILLECTOMY & ADENOIDECTOMY      wisdom teeth               Social History:     Social History     Tobacco Use    Smoking status: Never    Smokeless tobacco: Never   Substance Use Topics    Alcohol use: Not Currently     Comment: about 4 glasses wine per week             Family History:     Family History   Problem  Relation Age of Onset    Diabetes Type 2  Mother     Obesity Mother     Sjogren's Mother     Sjogren's Maternal Grandmother              Immunizations:     Immunization History   Administered Date(s) Administered    COVID-19 12+ (2023-24) (Pfizer) 11/15/2023    COVID-19 Bivalent 12+ (Pfizer) 12/14/2022    COVID-19 MONOVALENT 12+ (Pfizer) 08/20/2021, 09/10/2021    DTAP (<7y) 05/16/1995    Flu, Unspecified 03/07/2012, 10/15/2013, 11/07/2013, 11/15/2014, 09/10/2021    [id number] Novel Flu 02/12/2010    HPV Quadrivalent 08/20/2007, 11/15/2007, 05/08/2008, 08/20/2008    Hepatitis A (ADULT 19+) 08/20/2008, 08/15/2011, 07/27/2012    Influenza (IIV3) PF 11/21/2012    Influenza Vaccine >6 months,quad, PF 10/13/2016, 02/15/2019, 12/14/2022, 10/03/2023    Influenza,INJ,MDCK,PF,Quad >6mo(Flucelvax) 10/20/2020    Meningococcal ACWY (Menactra ) 08/15/2011    Polio, Unspecified  05/16/1995    RSV Vaccine (Abrysvo) 10/18/2023    TDAP (Adacel,Boostrix) 05/26/2021, 09/19/2023    TDAP Vaccine (Adacel) 08/15/2011    Td (Adult), Adsorbed 11/13/2003            Allergies:     Allergies   Allergen Reactions    Vancomycin Hives and Itching             Medications:     No medications prior to admission.             Review of Systems & Physical Exam:     The Review of Systems is negative other than noted in the HPI      LMP 03/06/2023   Gen: NAD  CV: RRR, nl S1/S2, no murmurs/clicks/gallops  Lungs: CTAB, non-labored breathing  Abd: Gravid, non-tender, non-distended  Ext: mild peripheral extremity edema;     Estimated Fetal Weight: 57%    FHT:  Monitoring External  FHT: Baseline 150 bpm; mod variability;  accels present; no decelerations  TOCO 1 contractions in 10 minutes         Data:     Lab Results   Component Value Date    WBC 9.8 12/08/2023     Lab Results   Component Value Date    RBC 4.42 12/08/2023     Lab Results   Component Value Date    HGB 12.6 12/08/2023    HGB 11.8 06/23/2021     Lab Results   Component Value Date    HCT 38.0  "2023     No components found for: \"MCT\"  Lab Results   Component Value Date    MCV 86 2023     Lab Results   Component Value Date    MCH 28.5 2023     Lab Results   Component Value Date    MCHC 33.2 2023     Lab Results   Component Value Date    RDW 12.7 2023     Lab Results   Component Value Date     2023     2021         Dylon Crowder, MS3  [employer]     Resident/Fellow Attestation   Sundar OLIVAREZ was present with the medical student who participated in the service and in the documentation of the note.  I have verified the history and personally performed the physical exam and medical decision making.  I agree with the assessment and plan of care as documented in the note.  I have reviewed the note and made changes as necessary.    Sundar Hernández DO, MA  [employer] [facility]- PGY2  9:22 AM 2023             Physician Attestation   I personally examined and evaluated this patient.  I discussed the patient with the resident/fellow and care team, and agree with the assessment and plan of care as documented in the note.     Key findings: Category 1 tracing.   Desires permanent sterilization.  Consent held.   Proceed with  with bilateral salpingectomy.     Please see A&P for additional details of medical decision making.          Jamila Cherry MD  Date of Service (when I saw the patient): 23    "

## 2023-12-11 NOTE — ANESTHESIA PROCEDURE NOTES
TAP Procedure Note    Pre-Procedure   Staff -        Anesthesiologist:  Jia Navas MD       Resident/Fellow: Pollo Street MD       Performed By: resident       Location: OB       Procedure Start/Stop Times: 2023 11:36 AM and 2023 11:58 AM       Pre-Anesthestic Checklist: patient identified, IV checked, site marked, risks and benefits discussed, informed consent, monitors and equipment checked, pre-op evaluation, at physician/surgeon's request and post-op pain management  Timeout:       Correct Patient: Yes        Correct Procedure: Yes        Correct Site: Yes        Correct Position: Yes        Correct Laterality: Yes        Site Marked: Yes  Procedure Documentation  Procedure: TAP       Diagnosis: POST  SECTION PAIN MANAGEMENT       Laterality: bilateral       Patient Position: supine       Skin prep: Chloraprep       Insertion Site: T9-10.       Needle Gauge: 21.        Needle Length (millimeters): 110        Ultrasound guided       1. Ultrasound was used to identify targeted nerve, plexus, vascular marker, or fascial plane and place a needle adjacent to it in real-time.       2. Ultrasound was used to visualize the spread of anesthetic in close proximity to the above referenced structure.       3. A permanent image is entered into the patient's record.       4. The visualized anatomic structures appeared normal.       5. There were no apparent abnormal pathologic findings.    Assessment/Narrative         The placement was negative for: blood aspirated, painful injection and site bleeding       Paresthesias: No.       Bolus given via needle. no blood aspirated via catheter.        Secured via.        Insertion/Infusion Method: Single Shot       Complications: none    Medication(s) Administered   Bupivacaine 0.25% PF (Infiltration) - Infiltration   20 mL - 2023 11:48:00 AM  Bupivacaine liposome (Exparel) 1.3% LA inj susp (Infiltration) - Infiltration   20 mL -  "12/11/2023 11:48:00 AM  Medication Administration Time: 12/11/2023 11:36 AM      FOR [facility] ONLY:   Pain Team Contact information: please page the Pain Team Via [employer]. Search \"Pain\". During daytime hours, please page the attending first. At night please page the resident first.      "

## 2023-12-11 NOTE — PLAN OF CARE
Patient arrived to [facility] unit via zoom cart at 1330 ,with belongings, accompanied by spouse/ significant other, with infant in arms. Received report from  LAVELL Fuentes  and checked bands. Unit and room orientation started. Call light given; no concerns present at this time. Continue with plan of care.

## 2023-12-11 NOTE — ANESTHESIA PROCEDURE NOTES
"Intrathecal injection Procedure Note    Pre-Procedure   Staff -        Anesthesiologist:  Jia Navas MD       Resident/Fellow: Pollo Street MD       Performed By: resident       Location: OB       Procedure Start/Stop Times: 2023 10:24 AM and 2023 10:32 AM       Pre-Anesthestic Checklist: patient identified, IV checked, risks and benefits discussed, informed consent, monitors and equipment checked, pre-op evaluation, at physician/surgeon's request and post-op pain management  Timeout:       Correct Patient: Yes        Correct Procedure: Yes        Correct Site: Yes        Correct Position: Yes   Procedure Documentation  Procedure: intrathecal injection       Diagnosis:  section pain management       Patient Position: sitting       Skin prep: Chloraprep       Insertion Site: L3-4. (midline approach).       Needle Gauge: 25.        Needle Length (Inches): 3.5        Spinal Needle Type: Pencan       Introducer used       # of attempts: 2 and  # of redirects:  1    Assessment/Narrative         Paresthesias: No.       Sensory Level: T4       CSF fluid: clear.       Opening pressure was cmH2O while  Sitting.      Medication(s) Administered   0.75% Hyperbaric Bupivacaine (Intrathecal) - Intrathecal   1.6 mL - 2023 10:32:00 AM  Fentanyl PF (Intrathecal) - Intrathecal   15 mcg - 2023 10:32:00 AM  Morphine PF 1 mg/mL (Intrathecal) - Intrathecal   0.15 mg - 2023 10:32:00 AM  Medication Administration Time: 2023 10:24 AM      FOR [facility] ([facility]) ONLY:   Pain Team Contact information: please page the Pain Team Via [employer]. Search \"Pain\". During daytime hours, please page the attending first. At night please page the resident first.      "

## 2023-12-11 NOTE — DISCHARGE SUMMARY
[facility]   Discharge Summary    Philip Pfeiffer MRN# [id number]   Age: 33 year old YOB: 1990     Date of Admission:  2023  Date of Discharge::  2023  Admitting Physician:  Jamila Cherry MD  Discharge Physician:  Leticia Quiros MD          Admission Diagnoses:   - Intrauterine pregnancy at 40w0d  - Hx of stat CS for placental abruption  - Satisfied parity          Discharge Diagnosis:     Same, delivered           Procedures:     Procedure(s):  Repeat low segment transverse  section via pfannenstiel skin incision with 1 layer uterine closure, bilateral salpingectomy   Spinal anesthesia            Medications Prior to Admission:     Medications Prior to Admission   Medication Sig Dispense Refill Last Dose    doxylamine (UNISOM) 25 MG TABS tablet Take 25 mg by mouth At Bedtime   12/10/2023    famotidine (PEPCID) 20 MG tablet Take 1 tablet (20 mg) by mouth 2 times daily 90 tablet 1 Past Week    Prenatal Vit-Fe Fumarate-FA (PRENATAL VITAMIN PO)    12/10/2023    acyclovir (ZOVIRAX) 800 MG tablet Take 1 tablet (800 mg) by mouth 5 times daily for 7 days (Patient not taking: Reported on 2023) 35 tablet 0           Discharge Medications:        Review of your medicines        START taking        Dose / Directions   acetaminophen 325 MG tablet  Commonly known as: TYLENOL      Dose: 650 mg  Take 2 tablets (650 mg) by mouth every 6 hours as needed for mild pain Start after Delivery.  Quantity: 100 tablet  Refills: 0     ibuprofen 600 MG tablet  Commonly known as: ADVIL/MOTRIN      Dose: 600 mg  Take 1 tablet (600 mg) by mouth every 6 hours as needed for moderate pain Start after delivery  Quantity: 60 tablet  Refills: 0     senna-docusate 8.6-50 MG tablet  Commonly known as: SENOKOT-S/PERICOLACE      Dose: 1 tablet  Take 1 tablet by mouth daily Start after delivery.  Quantity: 100 tablet  Refills: 0            CONTINUE these medicines which have NOT  CHANGED        Dose / Directions   acyclovir 800 MG tablet  Commonly known as: ZOVIRAX  Used for: Herpes zoster without complication      Dose: 800 mg  Take 1 tablet (800 mg) by mouth 5 times daily for 7 days  Quantity: 35 tablet  Refills: 0     doxylamine 25 MG Tabs tablet  Commonly known as: UNISOM      Dose: 25 mg  Take 25 mg by mouth At Bedtime  Refills: 0     famotidine 20 MG tablet  Commonly known as: PEPCID  Used for: Heartburn      Dose: 20 mg  Take 1 tablet (20 mg) by mouth 2 times daily  Quantity: 90 tablet  Refills: 1     PRENATAL VITAMIN PO      Refills: 0               Where to get your medicines        These medications were sent to [facility] [place], [place] - [address]  [address] [address]      Phone: [phone]   acetaminophen 325 MG tablet  ibuprofen 600 MG tablet  senna-docusate 8.6-50 MG tablet            Consultations:   Anesthesia  Lactation          Brief Admission History:   Ms. Philip Pfeiffer is a 33 year old now  who initially presented at 40w0d for scheduled Repeat  and bilateral salpingectomy.       Intraoperative course   The procedure was uncomplicated.   mL.  See operative report for details.     Operative findings: - Single, liveborn on 2023. Apgars pending at one and five minutes.  Birth weight: pending .  Fetal presentation: SHANNA. Amniotic fluid: clear.    - Placenta intact with 3 vessel cord.     - Normal appearing uterus, fallopian tubes, ovaries.   -  No intraabdominal adhesions.  No abdominal wall adhesions  - bilateral tubes ligated and removed, hemostatic at end of case       Postpartum Course   The patient's hospital course was unremarkable.  She recovered as anticipated and experienced no post-operative complications. On discharge, her pain was well controlled. Vaginal bleeding is similar to peak menstrual flow.  Voiding without difficulty.  Ambulating well, tolerating a normal diet, and has  resumption of bowel function.  No fever or significant wound drainage.  Breastfeeding well.  Infant is stable. She was discharged on post-partum day #2.    Post-partum hemoglobin:   Hemoglobin   Date Value Ref Range Status   12/12/2023 11.1 (L) 11.7 - 15.7 g/dL Final   06/23/2021 11.8 11.7 - 15.7 g/dL Final     Contraception: s/p bilateral salpingectomy  Rh positive, no Rhogam indicated  Rubella immune, no MMR indicated          Discharge Instructions and Follow-Up:     Discharge diet: Regular   Discharge activity: No lifting greater than 20 lbs, pushing, pulling, or other strenuous activity for 6 weeks. Pelvic rest for 6 weeks including no sexual intercourse, tampons, or douching. No driving until you can slam on the breaks without pain or while on narcotic pain medications.    Discharge follow-up: Follow up with primary OB for routine postpartum visit in 6 weeks   Wound care: Keep incision clean and dry           Discharge Disposition:     Discharged to home     Brie Perdue MD  Obstetrics & Gynecology, PGY-2  12/13/2023 6:44 AM

## 2023-12-11 NOTE — ANESTHESIA POSTPROCEDURE EVALUATION
Patient: Philip Pfeiffer    Procedure: Procedure(s):   section, tubal ligation, combined       Anesthesia Type:  Spinal    Note:  Disposition: Inpatient   Postop Pain Control: Uneventful            Sign Out: Well controlled pain   PONV: No   Neuro/Psych: Uneventful            Sign Out: Acceptable/Baseline neuro status   Airway/Respiratory: Uneventful            Sign Out: Acceptable/Baseline resp. status   CV/Hemodynamics: Uneventful            Sign Out: Acceptable CV status; No obvious hypovolemia; No obvious fluid overload   Other NRE: NONE   DID A NON-ROUTINE EVENT OCCUR? No           Last vitals:  Vitals Value Taken Time   /66 23 1320   Temp 36.4  C (97.6  F) 23 1320   Pulse 77 23 1322   Resp 12 23 1322   SpO2 99 % 23 1322   Vitals shown include unfiled device data.    Electronically Signed By: Jia Navas MD  2023  2:17 PM

## 2023-12-11 NOTE — OP NOTE
[facility]   OPERATIVE NOTE:  SECTION     Surgery Date:  2023  Surgeon(s): Jamila Cherry MD  Assistants:  Sundar Hernández DO, MA     Preoperative Diagnoses:  -  at 40w0d  -  History of  section x1     Postoperative diagnoses:  -  at 40w0d, now delivered     Procedure performed:  Repeat low segment transverse  section via pfannenstiel skin incision with 1 layer uterine closure, bilateral salpingectomy     Anesthesia:  Spinal   Est Blood Loss (mL): 354 mL  Fluid replacement: 1500 mL crystalloid  Urine Output: 200 mL  Specimens: bilateral fallopian tubes  Additional intraoperative medications: none  Complications: None apparent     Operative findings:   - Single, liveborn on 2023. Apgars 9 and 9 at one and five minutes.  Birth weight: 3660g.  Fetal presentation: SHANNA. Amniotic fluid: clear.    - Placenta intact with 3 vessel cord.     - Normal appearing uterus, fallopian tubes, ovaries.   -  No intraabdominal adhesions.  No abdominal wall adhesions  - bilateral tubes ligated and removed, hemostatic at end of case     Disposition: Transferred in stable condition to the PACU    Indication: Philip Pfeiffer is a 33 year old, , who was admitted at 40w0d  for RLTCS and BS. The risks, benefits, and alternatives of  delivery were explained and the patient agreed to proceed.     Procedure details:  After obtaining informed consent, the patient was taken to the operating room. She was placed in the dorsal supine position with a leftward tilt and prepped and draped in the usual sterile fashion.     Following test of adequate spinal anesthesia, the abdomen was entered through a transverse skin incision through her previous scar. The skin incision was made sharply and carried through the subcutaneous tissue to the fascia.  Fascia was incised in the midline and extended laterally with the Mendez scissors.  The superior margin of the fascial  incision was grasped with Kocher clamps and dissected from the underlying muscle with sharp and blunt dissection, which was then repeated at the lower margin of the fascial incision.  The muscle was  in the midline.      The peritoneum was entered bluntly and the opening extended by digital dissection with care to avoid the bladder. A bladder blade was placed.The lower segment of the uterus was opened sharply in a transverse fashion and extended with digital pressure. The infant's head was noted to be in the SHANNA position. It was elevated to the level of the hysterotomy and was delivered atraumatically, shoulders delivered easily thereafter. The cord was doubly clamped after 60 seconds and cut and the infant was handed off to the waiting  staff. A segment of the cord was cut and set aside for cord gases if needed.     The placenta was expressed from the uterus.  The uterus was left in the abdomen and cleared of all clots and debris.  The uterus was massaged. Pitocin was given through the running IV.  The hysterotomy was repaired with 0-vicryl suture in a running locked fashion.     Attention was turned to the fallopian tubes. The left tube was clamped with a anuel and carried out to the fimbriae. Using the ligasure device the mesosalpinx was ligated and carried to the cornua where it was transected. The right fallopian tube was similarly ligated and handed off. All sites confirmed hemostatic     The hysterotomy was again inspected and found to be hemostatic.  The abdominal wall was examined and also found to be hemostatic.  The fascia was closed with a running suture of 0-vicryl.  Subcutaneous tissue was irrigated. Areas that were not hemostatic were controlled with cautery. The subcutaneous tissue was re-approximated with 3-0 vicryl. The skin was closed with 4-0 vicryl in a subcuticular fashion. The patient tolerated the procedure well and was taken to the recovery room in stable condition. All  sponge, needle and instrument counts were correct x2.     Dr. Cherry was present for the entire procedure.     Sundar Hernández DO, MA  [employer] [facility]- PGY2  1:07 PM December 11, 2023       Physician Attestation   I was present for the entire procedure between opening and closing.    Jamila Cherry MD  Date of Service (when I saw the patient): 12/11/23

## 2023-12-11 NOTE — PLAN OF CARE
Goal Outcome Evaluation:    Pt stable for PACU recovry.  VSS.  Denies pain other than tenderness with fundal checks.  Postpartum checks wdl.  Dermatomes at PACU completion T10, pt able to move legs without resistance.  Tolerating water.  Reveles cares done, reveles patent, 100ml clear yellow urine emptied.  Mom breastfeeding with minimal assistance.  Plan to continue with routine postop postpartum cares.

## 2023-12-11 NOTE — PLAN OF CARE
Goal Outcome Evaluation: VSS. Postpartum checks WDL. Incisional drsg CDI. Reveles in place, adequate UO. Plan to get OOB and remove reveles this evening. Pain managed with tylenol, toradol, and oxycodone. Breastfeeding on left side, hand-expressing on right side per pt preference d/t shingles rash.

## 2023-12-11 NOTE — PROGRESS NOTES
Data: Patient presented to [facility] at 0826.   Reason for maternal/fetal assessment per patient is Scheduled  Section  And bilateral tubal ligation.  Patient is a . Prenatal record reviewed.      OB History    Para Term  AB Living   2 2 2 0 0 2   SAB IAB Ectopic Multiple Live Births   0 0 0 0 2      # Outcome Date GA Lbr Nikolas/2nd Weight Sex Delivery Anes PTL Lv   2 Term 23 40w0d   M CS-LTranv Spinal  JIMMY      Name: Male-Marilu [place] Term 21 41w0d   F -SEC   JIMMY      Complications: Abruptio Placenta      Name: HEIDYFEMALE-MARILU      Apgar1: 8  Apgar5: 8   . Medical history:   Past Medical History:   Diagnosis Date    Varicose veins of lower extremity    . Gestational Age 40w0d. VSS. Fetal movement present. Patient denies cramping, backache, vaginal discharge, pelvic pressure, UTI symptoms, GI problems, bloody show, vaginal bleeding, edema, headache, visual disturbances, epigastric or URQ pain, abdominal pain, rupture of membranes. Support persons Luke present.  Action: Verbal consent for EFM. Triage assessment completed. EFM applied. Uterine assessment per toco. Fetal assessment: Presumed adequate fetal oxygenation documented (see flow record).   Response: Dr. Hernández informed of patient arrival. Plan per provider is proceed with scheduled  and BTL, move to OR early if possible. Patient verbalized agreement with plan. Preop cares initiated and completed.

## 2023-12-12 LAB — HGB BLD-MCNC: 11.1 G/DL (ref 11.7–15.7)

## 2023-12-12 PROCEDURE — 250N000013 HC RX MED GY IP 250 OP 250 PS 637

## 2023-12-12 PROCEDURE — 85018 HEMOGLOBIN: CPT

## 2023-12-12 PROCEDURE — 120N000002 HC R&B MED SURG/OB UMMC

## 2023-12-12 PROCEDURE — 250N000011 HC RX IP 250 OP 636

## 2023-12-12 PROCEDURE — 36415 COLL VENOUS BLD VENIPUNCTURE: CPT

## 2023-12-12 RX ORDER — IBUPROFEN 600 MG/1
600 TABLET, FILM COATED ORAL EVERY 6 HOURS PRN
Qty: 60 TABLET | Refills: 0 | Status: SHIPPED | OUTPATIENT
Start: 2023-12-12 | End: 2024-01-23

## 2023-12-12 RX ORDER — ACETAMINOPHEN 325 MG/1
650 TABLET ORAL EVERY 6 HOURS PRN
Qty: 100 TABLET | Refills: 0 | Status: SHIPPED | OUTPATIENT
Start: 2023-12-12 | End: 2024-01-23

## 2023-12-12 RX ORDER — AMOXICILLIN 250 MG
1 CAPSULE ORAL DAILY
Qty: 100 TABLET | Refills: 0 | Status: SHIPPED | OUTPATIENT
Start: 2023-12-12 | End: 2024-01-23

## 2023-12-12 RX ADMIN — DOCUSATE SODIUM AND SENNOSIDES 2 TABLET: 8.6; 5 TABLET, FILM COATED ORAL at 19:51

## 2023-12-12 RX ADMIN — OXYCODONE HYDROCHLORIDE 5 MG: 5 TABLET ORAL at 19:51

## 2023-12-12 RX ADMIN — SIMETHICONE 80 MG: 80 TABLET, CHEWABLE ORAL at 12:30

## 2023-12-12 RX ADMIN — FAMOTIDINE 20 MG: 10 TABLET ORAL at 19:51

## 2023-12-12 RX ADMIN — OXYCODONE HYDROCHLORIDE 5 MG: 5 TABLET ORAL at 03:26

## 2023-12-12 RX ADMIN — ACETAMINOPHEN 975 MG: 325 TABLET, FILM COATED ORAL at 09:56

## 2023-12-12 RX ADMIN — ACETAMINOPHEN 975 MG: 325 TABLET, FILM COATED ORAL at 23:25

## 2023-12-12 RX ADMIN — KETOROLAC TROMETHAMINE 30 MG: 30 INJECTION, SOLUTION INTRAMUSCULAR; INTRAVENOUS at 07:44

## 2023-12-12 RX ADMIN — OXYCODONE HYDROCHLORIDE 5 MG: 5 TABLET ORAL at 14:18

## 2023-12-12 RX ADMIN — ACETAMINOPHEN 975 MG: 325 TABLET, FILM COATED ORAL at 16:11

## 2023-12-12 RX ADMIN — ACETAMINOPHEN 975 MG: 325 TABLET, FILM COATED ORAL at 03:25

## 2023-12-12 RX ADMIN — IBUPROFEN 800 MG: 800 TABLET, FILM COATED ORAL at 13:44

## 2023-12-12 RX ADMIN — KETOROLAC TROMETHAMINE 30 MG: 30 INJECTION, SOLUTION INTRAMUSCULAR; INTRAVENOUS at 00:20

## 2023-12-12 RX ADMIN — IBUPROFEN 800 MG: 800 TABLET, FILM COATED ORAL at 19:51

## 2023-12-12 RX ADMIN — DOCUSATE SODIUM AND SENNOSIDES 2 TABLET: 8.6; 5 TABLET, FILM COATED ORAL at 07:50

## 2023-12-12 ASSESSMENT — ACTIVITIES OF DAILY LIVING (ADL)
ADLS_ACUITY_SCORE: 18

## 2023-12-12 NOTE — PROGRESS NOTES
"[facility]   Post-partum Progress Note    Name:  Philip Pfeiffer  MRN: [id number]    S:   Patient reports feeling well.  Pain well controlled.  Tolerating regular diet without nausea or vomiting. Ambulating without dizziness.  Voiding spontaneously. Has not yet passed flatus; has not had bowel movement. Lochia light, similar to menstrual flow. Denies fever/chills, headache, vision changes, chest pain, shortness of breath, RUQ pain, increased edema. Working on breastfeeding.  Is s/p bilateral salpingectomy for contraception.  Plans discharge when appropriate.    O:   /70 (BP Location: Right arm, Patient Position: Semi-Montelongo's, Cuff Size: Adult Regular)   Pulse 75   Temp 97.5  F (36.4  C) (Oral)   Resp 16   Ht 1.676 m (5' 6\")   Wt 86.2 kg (190 lb)   LMP 2023   SpO2 100%   Breastfeeding Unknown   BMI 30.67 kg/m    Gen:  Resting comfortably, NAD  CV:  RRR  Pulm:  Non-labored breathing. No cough or audible wheezing.   Abd:  Soft, appropriately tender to palpation, non-distended.  Fundus below umbilicus, firm, and non-tender.  Inc:  Bandage in place, c/d/i with minimal overlying shadowing, no surrounding erythema or edema  Ext:  Non-tender, 1+ LE edema b/l    Hgb:   Recent Labs   Lab 23  0956   HGB 12.6     A/P:  33 year old  POD#1 s/p scheduled RLTCS w/BS. Pregnancy notable for hx of stat CS due to placental abruption. Still early POD#1, not yet meeting postop goals. Continue with routine postpartum management.     #Routine Postpartum  Pain: Well-controlled with scheduled tylenol, toradol > ibuprofen, PRN oxy  Hgb: 12.6 >  > AM Hgb pending. VSS as noted above, asymptomatic. Will discharge with PO Fe if hgb < 10  GI:  Regular diet. BID Senna/Colace. PRN Miralax, Simethicone. Encourage hydration and ambulation  : Voiding spontaneously  PPx:  Encouraged ambulation; Lovenox not indicated  Rh: Positive  Rub:  Immune  Hep B: Surface antigen negative  Vax: S/p influenza " (10/3/23), TDAP (9/19/23), COVID (11/15/23)  Baby: In room, doing well  Feed: Working on breastfeeding  BC: S/p bilateral salpingectomy  Dispo: Plan for home on POD#2-3.      Naheed Kline MD  Obstetrics & Gynecology, PGY-2  12/12/2023 6:55 AM    Patient seen and examined by me, agree with above resident note. Overall doing well and meeting early goals.  Hgb 11.1. ambulation encouraged, plans to work with LC today to help with latching. Possible discharge in am.  Cont routine cares    DONNA VALENTINE MD

## 2023-12-12 NOTE — LACTATION NOTE
This note was copied from a baby's chart.  Consult for:  Lactation     Infant Name: infant boy    Infant's Primary Care Clinic:     Delivery Information:  infant was born at Gestational Age: 40w0d via   delivery on 2023 10:51 AM     Maternal Health History:  (NOTE - see maternal data and prenatal history report to review, select from baby index report), Maternal past medical history, problem list and prior to admission medications reviewed and unremarkable., Maternal past medical history, problem list and prior to admission medications reviewed and notable for   Information for the patient's mother:  Rocio Pfeifferni EVGENY [[id number]]     Past Medical History:   Diagnosis Date    Varicose veins of lower extremity     ,   Information for the patient's mother:  Philip Pfeiffer [[id number]]     Patient Active Problem List   Diagnosis     delivery delivered    S/P  section    ,   Maternal complications: None, and had shingles 10 days ago, lesions are crusted over and covered with Tegaderm, they are flat, no blisters present. No active lesions, no lesions on left breast (some under left armpit which are crusted and flat and covered with tegaderm); some in middle of sternum slightly protruding into outer edge of right breast (on right side). Has been pumping / dumping milk on the right breast and feeding or attempting to feed on the left side. Re-assured her it was OK to feed on the right breast as her lesions are not active, covered, and crusted over, and far away, several inches away from where infant would latch.  Philip is deciding if she will continue to pumping the right side and discard any colostrum or not.  She is attempting to feed on the left breast.    Infection may be over 2 different nerve roots (under left armpit) and second area near sternum and slightly into right beginning breast tissue.      Maternal Breast Exam/Breastfeeding History:   noted breast growth and sensitivity in early  pregnancy. She denies any history of breast/chest injury or surgery. Her breasts are soft and symmetrical with bilateral intact, everted nipples. She has been able to hand express colostrum. ?    Infant information:  was AGA at birth and has age appropriate output and weight loss.      Weight Change Since Birth: 0% at 0 day old     Oral exam of baby:   has normal /mildly recessed/recessed jaw, normal/moderate/high arched palate, good length of tongue beyond lingual frenulum attachment, extension well beyond gum ridge & organized when sucking on finger.     Feeding History: pumped and bottled her older infant     Feeding Assessment: sleepy at breast, per Philip, had a very good feeding within 1 hour of birth and has been sleepy since.  Encouraged hand expression.    Education:   - Expected  feeding patterns in the first few days (pg. 38 of Your Guide to To Postpartum and  Care)/ the Second Night  - Stages of milk production  - Benefits of hand expression of colostrum  - Early feeding cues     - Benefits of feeding on cue  - Benefits of skin to skin  - Breastfeeding positions  - Pumping recommendations (based on patient need)  - [facility] breast pump part/infant feeding supplies cleaning recommendations  - Inpatient breastfeeding support  - Outpatient lactation resources    Handouts: Infant Feeding Log (Week 1, Your Guide to Postpartum &  Care Book)    Home Breast Pump: using symphony in room    Plan: Continue breastfeeding on cue with RN support as needed with a goal of 8-12 feedings per day.     Encourage frequent skin to skin and hand expression.     Encouraged follow up with outpatient lactation consultant  as needed after discharge. Family plans to follow up with lactation within one week.        Martha Quiros RN, IBCLC   Lactation Consultant  Ascom: *[phone]  Office: [phone]

## 2023-12-12 NOTE — LACTATION NOTE
Follow up Lactation:  Infant slightly more awake, alert today. Infant attempted latch in cross-cradle and then in football hold with Philip. She is comfortable now breastfeeding on both sides and talked about the Shingles with her pediatrician that rounded today.      Infant latches but has inconsistent sucking, encouraged suck training between feeding as able. Hand expressing / pumping with unsuccessful breastfeeding attempts.     Infant did latch and had a few sucks with a few swallows, but would like to see more consistent sucking / swallow patterns.    Plan going forward is attempt to latch every 2 couple hours, earlier if infant cues.  Offer both breasts, goal to see consistent feeding pattern when at breast, both sides.  If not meeting goal at breasts, go ahead and pump and hand express to feed back maternal breast milk.  Will continue to monitor and continue with suck training to help infant organize his sucking a bit more quickly when at the breast.  Hand express prior to latching to help infant latch and orient to suckling.    Martha Quiros RN, IBCLC on 12/12/2023 at 12:56 PM

## 2023-12-12 NOTE — PLAN OF CARE
Goal Outcome Evaluation:  Vital signs within normal limits. Postpartum checks within normal limits. Patient eating and drinking normally. Wilson removed, due to void. Incisional pain managed with Toradol, tylenol, and oxycodone. Patient performing self cares and is able to care for infant. Breastfeeding on cue, requires assist with deeper latch and positioning. Positive attachment behaviors observed with infant. Support person present and very supportive. Will continue to assess/assist as needed.

## 2023-12-12 NOTE — PLAN OF CARE
Goal Outcome Evaluation:      Plan of Care Reviewed With: patient    Overall Patient Progress: improvingOverall Patient Progress: improving    Outcome Evaluation: Patient is stable and her pain is well manged iwth motrin, tylenol and oxycodone. She's voding well with no issues, adequate amount, Have'nt had any bowel movement yet and no flatus yet. Encouarged to increase ambulation and fluid intake, Advised to shower this afternoon and have incisional dressing be taken off.  Will continue [facility] plan of care and assist patient as needed.

## 2023-12-12 NOTE — PLAN OF CARE
Goal Outcome Evaluation:    VSS. Postpartum assessment WDL. Pain adequately managed with tylenol, ibuprofen, and oxycodone. Ambulating independently and tolerating regular diet. Straight cathed x1 after unable to void. Had one good measured void at 0700, awaiting second. Breastfeeding with some support, hand expressing drops, and pumping for stimulation. Bonding well with . Continue with plan of care.

## 2023-12-13 VITALS
SYSTOLIC BLOOD PRESSURE: 106 MMHG | TEMPERATURE: 97.6 F | OXYGEN SATURATION: 97 % | DIASTOLIC BLOOD PRESSURE: 75 MMHG | HEIGHT: 66 IN | WEIGHT: 193.12 LBS | RESPIRATION RATE: 16 BRPM | HEART RATE: 90 BPM | BODY MASS INDEX: 31.04 KG/M2

## 2023-12-13 PROCEDURE — 250N000013 HC RX MED GY IP 250 OP 250 PS 637

## 2023-12-13 RX ORDER — OXYCODONE HYDROCHLORIDE 5 MG/1
5 TABLET ORAL EVERY 4 HOURS PRN
Qty: 10 TABLET | Refills: 0 | Status: SHIPPED | OUTPATIENT
Start: 2023-12-13 | End: 2024-01-23

## 2023-12-13 RX ADMIN — DOCUSATE SODIUM AND SENNOSIDES 2 TABLET: 8.6; 5 TABLET, FILM COATED ORAL at 08:27

## 2023-12-13 RX ADMIN — IBUPROFEN 800 MG: 800 TABLET, FILM COATED ORAL at 08:27

## 2023-12-13 RX ADMIN — IBUPROFEN 800 MG: 800 TABLET, FILM COATED ORAL at 02:00

## 2023-12-13 RX ADMIN — ACETAMINOPHEN 975 MG: 325 TABLET, FILM COATED ORAL at 12:10

## 2023-12-13 RX ADMIN — ACETAMINOPHEN 975 MG: 325 TABLET, FILM COATED ORAL at 05:21

## 2023-12-13 ASSESSMENT — ACTIVITIES OF DAILY LIVING (ADL)
ADLS_ACUITY_SCORE: 18

## 2023-12-13 NOTE — PLAN OF CARE
Goal Outcome Evaluation:  Patient is stable and discharge to home today with baby. Reviewed discharge education and instructions  and questions were addressed. Encouraged to call clinic for questions/ concerns after discharge.

## 2023-12-13 NOTE — PROGRESS NOTES
"[facility]   Post-partum Progress Note    Name:  Philip Pfeiffer  MRN: [id number]    S:   Patient reports feeling well.  Pain well controlled.  Tolerating regular diet without nausea or vomiting. Ambulating without dizziness.  Voiding spontaneously. Has not yet passed flatus; has not had bowel movement. Lochia light, similar to menstrual flow. Denies fever/chills, headache, vision changes, chest pain, shortness of breath, RUQ pain, increased edema. Working on breastfeeding.  Is s/p bilateral salpingectomy for contraception.  Plans discharge today.    O:   BP 99/76 (BP Location: Right arm, Patient Position: Semi-Montelongo's, Cuff Size: Adult Regular)   Pulse 93   Temp 97.6  F (36.4  C) (Oral)   Resp 16   Ht 1.676 m (5' 6\")   Wt 86.2 kg (190 lb)   LMP 2023   SpO2 97%   Breastfeeding Unknown   BMI 30.67 kg/m    Gen:  Resting comfortably, NAD  CV:  RRR  Pulm:  Non-labored breathing. No cough or audible wheezing.   Abd:  Soft, appropriately tender to palpation, non-distended.  Fundus below umbilicus, firm, and non-tender.  Inc:  Incision c/d/i with skin well-approximated and no surrounding erythema or edema  Ext:  Non-tender, 1+ LE edema b/l    A/P:  33 year old  POD#2 s/p scheduled RLTCS w/BS. Pregnancy notable for hx of stat CS due to placental abruption. Now POD#2, and meeting postop goals. Continue with routine postpartum management.     #Routine Postpartum  Pain: Well-controlled with scheduled tylenol, toradol > ibuprofen, PRN oxy  Hgb: 12.6 >  > 11.1. VSS as noted above, asymptomatic. Will discharge with PO Fe if hgb < 10  GI:  Regular diet. BID Senna/Colace. PRN Miralax, Simethicone. Encourage hydration and ambulation  : Voiding spontaneously  PPx:  Encouraged ambulation; Lovenox not indicated  Rh: Positive  Rub:  Immune  Hep B: Surface antigen negative  Vax: S/p influenza (10/3/23), TDAP (23), COVID (11/15/23)  Baby: In room, doing well  Feed: Working on " breastfeeding  BC: S/p bilateral salpingectomy  Dispo: Plan for home on POD#2.      Naheed Kline MD  Obstetrics & Gynecology, PGY-2  12/13/2023 7:53 AM

## 2023-12-13 NOTE — DISCHARGE INSTRUCTIONS
Warning Signs after Having a Baby    Keep this paper on your fridge or somewhere else where you can see it.    Call your provider if you have any of these symptoms up to 12 weeks after having your baby.    Thoughts of hurting yourself or your baby  Pain in your chest or trouble breathing  Severe headache not helped by pain medicine  Eyesight concerns (blurry vision, seeing spots or flashes of light, other changes to eyesight)  Fainting, shaking or other signs of a seizure    Call 9-1-1 if you feel that it is an emergency.     The symptoms below can happen to anyone after giving birth. They can be very serious. Call your provider if you have any of these warning signs.    My provider s phone number: _______________________    Losing too much blood (hemorrhage)    Call your provider if you soak through a pad in less than an hour or pass blood clots bigger than a golf ball. These may be signs that you are bleeding too much.    Blood clots in the legs or lungs    After you give birth, your body naturally clots its blood to help prevent blood loss. Sometimes this increased clotting can happen in other areas of the body, like the legs or lungs. This can block your blood flow and be very dangerous.     Call your provider if you:  Have a red, swollen spot on the back of your leg that is warm or painful when you touch it.   Are coughing up blood.     Infection    Call your provider if you have any of these symptoms:  Fever of 100.4 F (38 C) or higher.  Pain or redness around your stitches if you had an incision.   Any yellow, white, or green fluid coming from places where you had stitches or surgery.    Mood Problems (postpartum depression)    Many people feel sad or have mood changes after having a baby. But for some people, these mood swings are worse.     Call your provider right away if you feel so anxious or nervous that you can't care for yourself or your baby.    Preeclampsia (high blood pressure)    Even if you  didn't have high blood pressure when you were pregnant, you are at risk for the high blood pressure disease called preeclampsia. This risk can last up to 12 weeks after giving birth.     Call your provider if you have:   Pain on your right side under your rib cage  Sudden swelling in the hands and face    Remember: You know your body. If something doesn't feel right, get medical help.     For informational purposes only. Not to replace the advice of your health care provider. Copyright 2020 [facility]. All rights reserved. Clinically reviewed by Nicci Downing, RNC-OB, MSN. [employer] 217412 - Rev .    Postop  Birth Instructions    Activity     Do not lift more than 10 pounds for 6 weeks after surgery.  Ask family and friends for help when you need it.  No driving until you have stopped taking your pain medications (usually two weeks after surgery).  No heavy exercise or activity for 6 weeks.  Don't do anything that will put a strain on your surgery site.  Don't strain when using the toilet.  Your care team may prescribe a stool softener if you have problems with your bowel movements.     To care for your incision:     Keep the incision clean and dry.  Do not soak your incision in water. No swimming or hot tubs until it has fully healed. You may soak in the bathtub if the water level is below your incision.  Do not use peroxide, gel, cream, lotion, or ointment on your incision.  Adjust your clothes to avoid pressure on your surgery site (check the elastic in your underwear for example).     You may see a small amount of clear or pink drainage and this is normal.  Check with your health care provider:     If the drainage increases or has an odor.  If the incision reddens, you have swelling, or develop a rash.  If you have increased pain and the medicine we prescribed doesn't help.  If you have a fever above 100.4 F (38 C) with or without chills when placing thermometer under your tongue.    The area around your incision (surgery wound), will feel numb.  This is normal. The numbness should go away in less than a year.     Keep your hands clean:  Always wash your hands before touching your incision (surgery wound). This helps reduce your risk of infection. If your hands aren't dirty, you may use an alcohol hand-rub to clean your hands. Keep your nails clean and short.    Call your healthcare provider if you have any of these symptoms:     You soak a sanitary pad with blood within 1 hour, or you see blood clots larger than a golf ball.  Bleeding that lasts more than 6 weeks.  Vaginal discharge that smells bad.  Severe pain, cramping or tenderness in your lower belly area.  A need to urinate more frequently (use the toilet more often), more urgently (use the toilet very quickly), or it burns when you urinate.  Nausea and vomiting.  Redness, swelling or pain around a vein in your leg.  Problems breastfeeding or a red or painful area on your breast.  Chest pain and cough or are gasping for air.  Problems with coping with sadness, anxiety or depression. If you have concerns about hurting yourself or the baby, call your provider immediately.    You have questions or concerns after you return home.

## 2023-12-13 NOTE — LACTATION NOTE
This note was copied from a baby's chart.  Follow Up Consult: breastfeeding support on likely day of discharge     Infant Name: Tyrone    Infant's Primary Care Clinic: [facility]    Maternal Assessment: Reports breastfeeding is going well, feels that breasts are beginning to fill. Questions about pumping and breastfeeding as she exclusively pumped and bottle fed her first.       Infant Assessment:  Tyrone has age appropriate output and weight loss.      Weight Change Since Birth: -7% at 2 day old        Feeding Assessment: Breastfeeding at time of consult. Positioning appears supportive and latch looks deep. Baby is sleepy but wakes with good rhythmic suckle with gentle stimulation.     Education:   - Expected  feeding patterns in the first few days (pg. 38 of Your Guide to To Postpartum and  Care)/ the Second Night  - Stages of milk production  - Benefits of hand expression of colostrum  - Early feeding cues     - Benefits of feeding on cue  - How to tell if baby is getting enough  - Expected  output  - Infant Feeding Log  - Signs breastfeeding is going well (comfortable latch, audible swallows, age appropriate output and weight loss)    - Tips to prevent engorgement  - Signs of engorgement  - Tips to manage engorgement  - Pumping recommendations (based on patient need)  - Inpatient breastfeeding support  - Outpatient lactation resources    Handouts: Infant Feeding Log (Week 1, Your Guide to Postpartum & [place] Care Book) and [facility] Lactation Resources    Plan: Plan: Continue breastfeeding on cue with RN support as needed with a goal of 8-12 feedings per day. Encourage frequent skin to skin, breast massage and hand expression.     [facility] Outpatient Lact[facility] Resources with family. Encouraged follow up with outpatient lactation consultant as needed.          Faustina Nicholson RN, IBCLC   Lactation Consultant  Ascom: *[phone]  Office: [phone]

## 2023-12-13 NOTE — PLAN OF CARE
2200 Pt asleep, left undisturbed    Data: VSS, postpartum assessments WNL. She is voiding without difficulty, up ad shaunna, eating and drinking without nausea. Incision appears to be healing well, lochia WNL, no s/s infection. Breastfeeding infant / Pumping with no assistance. Taking tylenol, ibuprofen and oxycodone for pain and pt reports adequate pain management.   Action: Education provided on discharge goals, plan of care, pain management.  Response: Pt is agreeable with her plan of care. Pt is independent providing  cares and is attentive to infant needs. Support person, spouse, present. Anticipate discharge . Will continue with plan of care and notify provider of any changes in status.

## 2023-12-19 LAB
PATH REPORT.COMMENTS IMP SPEC: NORMAL
PATH REPORT.COMMENTS IMP SPEC: NORMAL
PATH REPORT.FINAL DX SPEC: NORMAL
PATH REPORT.GROSS SPEC: NORMAL
PATH REPORT.RELEVANT HX SPEC: NORMAL
PHOTO IMAGE: NORMAL

## 2024-01-19 ENCOUNTER — MEDICAL CORRESPONDENCE (OUTPATIENT)
Dept: HEALTH INFORMATION MANAGEMENT | Facility: CLINIC | Age: 34
End: 2024-01-19
Payer: COMMERCIAL

## 2024-01-23 ENCOUNTER — PRENATAL OFFICE VISIT (OUTPATIENT)
Dept: OBGYN | Facility: CLINIC | Age: 34
End: 2024-01-23
Payer: COMMERCIAL

## 2024-01-23 VITALS
DIASTOLIC BLOOD PRESSURE: 81 MMHG | HEART RATE: 80 BPM | OXYGEN SATURATION: 97 % | SYSTOLIC BLOOD PRESSURE: 125 MMHG | WEIGHT: 189.9 LBS | BODY MASS INDEX: 30.65 KG/M2

## 2024-01-23 DIAGNOSIS — Z12.4 SCREENING FOR MALIGNANT NEOPLASM OF CERVIX: Primary | ICD-10-CM

## 2024-01-23 PROCEDURE — G0145 SCR C/V CYTO,THINLAYER,RESCR: HCPCS | Performed by: OBSTETRICS & GYNECOLOGY

## 2024-01-23 PROCEDURE — 99207 PR POST PARTUM EXAM: CPT | Performed by: OBSTETRICS & GYNECOLOGY

## 2024-01-23 PROCEDURE — 87624 HPV HI-RISK TYP POOLED RSLT: CPT | Performed by: OBSTETRICS & GYNECOLOGY

## 2024-01-23 ASSESSMENT — PATIENT HEALTH QUESTIONNAIRE - PHQ9: SUM OF ALL RESPONSES TO PHQ QUESTIONS 1-9: 0

## 2024-01-23 NOTE — PROGRESS NOTES
Philip is here for a 6-week postpartum checkup.    She had a repeat c/s of a viable boy, weight 8 pounds 1 oz., with no complications.  Since delivery, she has been breast feeding.  She has No signs of infection, bleeding or other complications.  She is not pregnant.  We discussed contraception and she has chosen bilateral salpingectomy - done. .    Mood is good.  Today's Depression Rating was       1/23/2024    10:31 AM   PHQ-9 SCORE   PHQ-9 Total Score 0       EXAM:  Vitals:    01/23/24 1027   BP: 125/81   BP Location: Left arm   Patient Position: Sitting   Cuff Size: Adult Regular   Pulse: 80   SpO2: 97%   Weight: 86.1 kg (189 lb 14.4 oz)     HEENT: grossly normal.  NECK: no lymphadenopathy or thyroidomegaly.  LUNGS: CTA X 2, no rales or crackles.  BREASTS: symmetric, no masses or discharge  BACK: No spinal or CVA tenderness.  HEART: RRR without murmurs clicks or gallops.  ABDOMEN: soft, non tender, good bowel sounds, without masses rebound, guarding or tenderness.  Pfannenstiel incision well healed    PELVIC:    External genitalia: normal without lesion, repair well healed.                            Vagina: normal mucosa and rugae, no discharge.  Cervix: multiparous, well healed, without lesion.  Uterus: non pregnant in size, firm , mobile, no lesions.  Adnexa: non tender, without masses    EXTREMITIES: Warm to touch, good pulses, no ankle edema or calf tenderness.  NEUROLOGIC: grossly normal.    ASSESSMENT:   Normal 6-week postpartum exam after repeat c/s.    PLAN:  Pap smear Done and bilateral salpingectomy for contraception.      Case Report   Surgical Pathology Report                         Case: [id number]                                   Authorizing Provider:  Sundar Hernández MD           Collected:           12/11/2023 11:47 AM           Ordering Location:     [facility]    Received:            12/11/2023 02:33 PM                                  Birthplace                                                                    Pathologist:           Marcelle Jose MD                                                           Specimen:    Uterus, Bilateral Fallopian Tubes, Fallopian tubes, bilateral, sterilization              Final Diagnosis   A. Fallopian tubes, bilateral, sterilization:  -Bilateral fallopian tubes, complete transected

## 2024-01-26 LAB
BKR LAB AP GYN ADEQUACY: NORMAL
BKR LAB AP GYN INTERPRETATION: NORMAL
BKR LAB AP HPV REFLEX: NORMAL
BKR LAB AP PREVIOUS ABNORMAL: NORMAL
PATH REPORT.COMMENTS IMP SPEC: NORMAL
PATH REPORT.COMMENTS IMP SPEC: NORMAL
PATH REPORT.RELEVANT HX SPEC: NORMAL

## 2024-01-30 LAB
HUMAN PAPILLOMA VIRUS 16 DNA: NEGATIVE
HUMAN PAPILLOMA VIRUS 18 DNA: NEGATIVE
HUMAN PAPILLOMA VIRUS FINAL DIAGNOSIS: NORMAL
HUMAN PAPILLOMA VIRUS OTHER HR: NEGATIVE

## 2024-02-06 ASSESSMENT — EDINBURGH POSTNATAL DEPRESSION SCALE (EPDS)
THE THOUGHT OF HARMING MYSELF HAS OCCURRED TO ME: NEVER
I HAVE BEEN ABLE TO LAUGH AND SEE THE FUNNY SIDE OF THINGS: AS MUCH AS I ALWAYS COULD
I HAVE BEEN ANXIOUS OR WORRIED FOR NO GOOD REASON: HARDLY EVER
THINGS HAVE BEEN GETTING ON TOP OF ME: NO, MOST OF THE TIME I HAVE COPED QUITE WELL
I HAVE FELT SAD OR MISERABLE: NO, NOT AT ALL
I HAVE BEEN SO UNHAPPY THAT I HAVE HAD DIFFICULTY SLEEPING: NOT AT ALL
I HAVE FELT SCARED OR PANICKY FOR NO GOOD REASON: NO, NOT AT ALL
I HAVE BEEN SO UNHAPPY THAT I HAVE BEEN CRYING: NO, NEVER
I HAVE LOOKED FORWARD WITH ENJOYMENT TO THINGS: AS MUCH AS I EVER DID
I HAVE BLAMED MYSELF UNNECESSARILY WHEN THINGS WENT WRONG: NO, NEVER
TOTAL SCORE: 2

## 2024-02-06 NOTE — PROGRESS NOTES
"Assessment:   1.  8 1/2 week old infant gaining weight bottlefeeding expressed milk:  about 0.8 oz/day over the last 3 weeks    2.  Able to latch to breast today after assistance with positioning, using nipple shield on one side but latching without difficulty on other side.    3.  Good suck, but with milk transfer slightly below baby's needs during observed feeding in office today  4.  Mother with abundant milk supply    Plan:    Use good positioning for deep latch, with baby held close to body and baby's head/shoulders/hips in good alignment.  When in a seated position, use a pillow to help bring baby close to breasts, and stepstool to elevate your knees above hips.    When bringing your baby to your breast, compress your breast vertically and in line with baby's mouth--this will help them to get a larger mouthful of breast and a deeper latch.  If there is pinching or pain, try using a finger to give a little gentle pressure on his chin to help his open more widely and take in more of your breast.  If it is still painful, use a finger to break the suction, remove baby from the breast and try again until there is no pain with nursing.  There is sometimes a little pain when the baby first begins sucking, but after the first few seconds there should be no pain--only a tugging feeling.   Babies latch best to the breast by bringing their chin in first, so point your nipple towards baby's nose, tuck the chin in close, and then wait for his mouth to open.  When his mouth opens, bring his head in deeply (\"up and over the mountain\").  Baby's chin should be snugged deeply in your breast, their upper cheeks should be touching the breast, and their nose just out of the breast.   If your baby is struggling with latch and is unable to grasp your breast after several tries, consider adding the nipple shield.  Try to use this before baby gets distressed and frantic, because the feeding will go more easily--feeding should not be " "difficult and frustrating.  Position it with the cut-out where baby's nose will land, and turn it inside out a bit while applying so that your nipple is drawn deeply into the shield.  If your baby is still seeming frustrated, you can fill the shield with some milk using either hand expression or a curve-tip syringe, to provide them with an \"instant reward\" and encourage continued suckling.  Just use the shield if it is needed;  It may be needed some times and not others, or on one breast and not the other.  Once he is doing well, you can work towards weaning from it completely.      Continue to nurse baby on cue, 8-12 times each day.  You may find that Tyrone needs to nurse a bit more often than when he was bottlefeeding, but this is a normal pattern. When you nurse, feed on one side until baby finishes swallowing.  Once swallowing slows, use breast compression to encourage more swallowing, but once there is no more active swallowing, and baby is either sleeping, coming off the breast, or just \"nibbling,\" it is OK to use a finger to take baby off the breast and move to the other breast.  Do the same on the other side.  Offer both breasts at each feeding.  It is more important to watch the baby than the clock!    Tyrone needs about 25 - 30 oz of milk each day to grow well, or around 3 - 3.5 oz each feeding if he is eating about 8 times/day.   If he still seems hungry and unhappy after nursing, offer him an extra ounce or so of milk.  This will likely decrease as he becomes more effective at breastfeeding.  You can slowly decrease your pumping as Tyrone is taking more milk from your breast.  If your breasts are very full and uncomfortable after feeding, just pump a small amount (like an ounce)--enough to stay comfortable.  If you would normally be pumping between feedings, you can work on slowly eliminating these.  Choose the pumping session that you most want to drop, and instead of pumping until 5 oz come, stop " "after 4 oz.  Then drop back by another ounce, and then another, until you can comfortably drop that pumping entirely.  You can drop back on each session this way, until you are just pumping once or twice, or for the amount of milk that you need for bottles.  If you choose to drink alcohol, the best way to do that when breastfeeding is just to take moderate amounts (about one drink--not enough to feel \"tipsy\"), and wait about two hours after a drink to feed your baby.  Alcohol is present in the breastmilk in the same amount that is present in the bloodstream, but also is metabolized out of the breastmilk in about 2 hours, just as it is from the blood.  You should wait about 2 hours before feeding for every drink that you have--for example, if you have one drink, you can nurse two hours later.  If you have two, you should wait four hours.  If there is no more alcohol in the bloodstream, there is no alcohol in the milk, so there is no need to \"pump and dump.\"  The best time to have alcohol is around the time of feeding, because there will not yet be any alcohol in your milk;  then wait a few hours before feeding baby again--this will likely be near to the time they are ready to feed again anyway.     Follow up with lactation as  needed, and pediatric provider as planned.  Shared[employer]s can be used for brief questions, but it's important to know that messages are not seen Friday through Sunday. If urgent help is needed, Monday through Friday you can call [phone] and one of our lactation consultants will get the message and respond; if you need a rapid response over a weekend or holiday, it is best to call your on-call maternity or pediatric provider.  Please feel free to schedule a return visit if the concern is more detailed;  telephone visits are also an option if you don't feel you need to be seen in person.     Subjective: Philip is here today because she would like to return to direct breastfeeding from exclusively " pumping.  She shares that Tyrone had excessive weight loss after birth, and her breasts also became very full and engorged causing Tyrone to have difficulties with latch, so moved to exclusive pumping when he was about a week old.  She would like to try to return to direct breastfeeding as she feels it would be easier. She did try offering the breast last week and felt he latched fairly well, but was unsure if he was taking in milk.  Also has nipple shield, but is unsure how to use it. Baby has been taking 3-4 oz by bottle, and Philip is pumping about 6 times/day, producing 4-6 oz each session.  Using Medela pump and Freemie hands-free pump.  Philip also has a very large milk oversupply with significant milk stored, and would like to learn about milk donation.      Philip is fully vaccinated for Covid-19.    Previous Course: Planned repeat  without complication.  Seen by hospital IBCLC--initially had been pumping and discarding milk from right breast due to healing shingles lesions at breast border. Infant latching but with inconsistent suck.  Exclusively pumping at early pediatric visits.    Mother's Relevant Med/Surg History: Placental abruption first birth;  shingles 10 days before birth    Breastfeeding Goals: to return to direct breastfeeding from exclusive pumping    Previous Breastfeeding Experience:  First baby had 9 day NICU hospitalization, so exclusively pumped--pumped for 8 months.    Infant's name: Tyrone  Infant's bday: 23  Gestational age: 40 wk  Infant's birth weight: 8 # 1.1 oz   Discharge weight: 7 # 8.2 oz     Pediatric Provider: [facility], Dr. Queenie Zurita  Recent weights:  23:  7 # 6 oz  23:  8 # 10.5 oz  1/3/24:  8 # 10.5 oz  24:  8 # 15 oz      Peq Lactation Visit Questionnaire    2024  3:49 PM CST - Filed by Patient   What is your main concern today? Reintroducing breastfeeding   Your baby's first name: Tyrone   Your baby's last name: Kentrell   Type of  "Birth    Your doctor/midwife: Dr. Jamila Cherry   Baby's doctor or nurse practitioner: Dr. Queenie Ramirez   Baby's birthday: 2024   Birth weight: 8 lb 1 oz   Baby's weight just before leaving the hospital:    Baby's most recent weight: 8 lbs 15 oz   Date: 24   How often does your baby eat? 3.5 to 5 hours - bottle feeding only   How long does each feeding last?  3.5 to 4 oz   How much of the time does your baby take both breasts when nursing? 0   Can you hear the baby swallowing during nursing? No   How many times does your baby feed in 24 hours? 7   How many times does your baby urinate (pee) in 24 hours? 8   How many stools (poops) does your baby have in 24 hours? 4   Describe the color and consistency of the poop: Yellowish green - runny   Do you give your baby extra milk in addition to or instead of breastfeeding? Breastmilk   How much extra do you usually give?    How do you give extra milk? Bottle   Are you pumping your breasts? Yes   How often? 6 times a day for 20 mins - currently trying to get rid of 1am pump   How much is pumped? With both breast, 4 to 6 oz each pumping session   When you were pregnant did your breasts grow larger? Yes   Did your areola (the dark area around your nipple) grow larger or darker? Yes   Did you notice your breasts fill when your baby was 3-5 days old? Yes   Have you had any breast surgeries? No   Please select any of the following medical conditions you have been previously diagnosed with or are currently being treated for:    What else would you like the lactation consultant to know? My goal is to switch to breastfeeding with one to two pump sessions a day        Objective/Physical exam:   Her nipples are everted, the areola is compressible, the breast is soft and full.     Sore nipples: no   EPDS: 2, with \"never\" marked for question on thoughts of self-harm     Assessment of infant: 11.02% Weight for age percentile   Age today: 8 1/2 weeks  Today's weight: 10 " # 5 oz  Amount of milk transferred from LEFT side: 1.4 oz  Amount of milk transferred from RIGHT side: 0.6 oz    Baby has full flexion of arms and legs, normal tone, behavior is alert and active, respirations are normal, skin is normal, hydration is normal, jaw is normal size and alignment, palate is normal, frenulum is normal, baby can lateralize tongue, has adequate tongue lift, and tongue can protrude past bottom gum line. Upper labial frenulum is normal.    Suck exam:  Baby has strong, coordinated suck with good tongue cupping, but did easily lose suction on examining finger with application of gentle chin pressure    Baby thrush: none    Jaundice: none      Feeding assessment:  Baby was able to grasp the breast, but did not sustain latch.  After several attempts, Philip asked about possible nipple shield use--nipple shield applied and baby was able to sustain latch and transfer milk.  On second side baby was more restless and did not initially sustain latch--nipple shield filled with milk using curve-tip syringe and then did continue productive suckling.    Alignment: The baby was flex relaxed. Baby's head was aligned with its trunk. Baby did face mother. Baby was in cross cradle position today.   Areolar Grasp: Baby was able to open mouth widely. Baby's lips were not pursed. Baby's lips did flange outward. Tongue was visible over bottom gum. Baby had complete seal.     Areolar Compression: Baby made rhythmic motion. There were no clicking or smacking sounds. There was no severe nipple discomfort. Nipples appeared rounded after feeding.  Audible swallowing: Baby made quiet sounds of swallowing: There was an increase in frequency after milk ejection reflex. The milk ejection reflex is normal and milk supply is abundant.    /78 (BP Location: Left arm, Patient Position: Sitting, Cuff Size: Adult Regular)   Pulse 85   Resp 16   Wt 86.6 kg (191 lb)   LMP  (LMP Unknown)   Breastfeeding Yes   BMI 30.83 kg/m     OB History    Para Term  AB Living   2 2 2 0 0 2   SAB IAB Ectopic Multiple Live Births   0 0 0 0 2      # Outcome Date GA Lbr Nikolas/2nd Weight Sex Delivery Anes PTL Lv   2 Term 23 40w0d  3.66 kg (8 lb 1.1 oz) M CS-LTranv Spinal  JIMMY      Name: Tyrone Pfeiffer      Apgar1: 9  Apgar5: 9   1 Term 21 41w0d   F -SEC   JIMMY      Complications: Abruptio Placenta      Name: HEIDY,FEMALE-MARILU      Apgar1: 8  Apgar5: 8       Current Outpatient Medications:     Prenatal Vit-Fe Fumarate-FA (PRENATAL VITAMIN PO), , Disp: , Rfl:   Past Medical History:   Diagnosis Date    Varicose veins of lower extremity      Past Surgical History:   Procedure Laterality Date     SECTION N/A 2021    Procedure:  SECTION;  Surgeon: Jamila Cherry MD;  Location: [facility]     SECTION, TUBAL LIGATION, COMBINED Bilateral 2023    Procedure:  section, tubal ligation, combined;  Surgeon: Jamila Cherry MD;  Location: [facility] L[facility]    ORTHOPEDIC SURGERY Right     Right 2nd and 3rd metatarsal ORIF age 14, with subsequent osteomyelitis    TONSILLECTOMY & ADENOIDECTOMY      wisdom teeth       Family History   Problem Relation Age of Onset    Diabetes Type 2  Mother     Obesity Mother     Sjogren's Mother     Sjogren's Maternal Grandmother        Time spent:  Chart review/precharting: 10 min prior to day of service  Face-to-face visit:   63 min   Documentation:  15 min   Total time spent on day of service: 78 min    AGUSTIN Villagomez, CNM, IBCLC

## 2024-02-07 ENCOUNTER — OFFICE VISIT (OUTPATIENT)
Dept: OBGYN | Facility: CLINIC | Age: 34
End: 2024-02-07
Payer: COMMERCIAL

## 2024-02-07 VITALS
HEART RATE: 85 BPM | RESPIRATION RATE: 16 BRPM | DIASTOLIC BLOOD PRESSURE: 78 MMHG | WEIGHT: 191 LBS | SYSTOLIC BLOOD PRESSURE: 125 MMHG | BODY MASS INDEX: 30.83 KG/M2

## 2024-02-07 DIAGNOSIS — O92.79 OTHER DISORDERS OF LACTATION: Primary | ICD-10-CM

## 2024-02-07 PROCEDURE — 99417 PROLNG OP E/M EACH 15 MIN: CPT | Performed by: ADVANCED PRACTICE MIDWIFE

## 2024-02-07 PROCEDURE — 99215 OFFICE O/P EST HI 40 MIN: CPT | Performed by: ADVANCED PRACTICE MIDWIFE

## 2024-02-07 NOTE — PATIENT INSTRUCTIONS
"   Use good positioning for deep latch, with baby held close to body and baby's head/shoulders/hips in good alignment.  When in a seated position, use a pillow to help bring baby close to breasts, and stepstool to elevate your knees above hips.    When bringing your baby to your breast, compress your breast vertically and in line with baby's mouth--this will help them to get a larger mouthful of breast and a deeper latch.  If there is pinching or pain, try using a finger to give a little gentle pressure on his chin to help his open more widely and take in more of your breast.  If it is still painful, use a finger to break the suction, remove baby from the breast and try again until there is no pain with nursing.  There is sometimes a little pain when the baby first begins sucking, but after the first few seconds there should be no pain--only a tugging feeling.   Babies latch best to the breast by bringing their chin in first, so point your nipple towards baby's nose, tuck the chin in close, and then wait for his mouth to open.  When his mouth opens, bring his head in deeply (\"up and over the mountain\").  Baby's chin should be snugged deeply in your breast, their upper cheeks should be touching the breast, and their nose just out of the breast.   If your baby is struggling with latch and is unable to grasp your breast after several tries, consider adding the nipple shield.  Try to use this before baby gets distressed and frantic, because the feeding will go more easily--feeding should not be difficult and frustrating.  Position it with the cut-out where baby's nose will land, and turn it inside out a bit while applying so that your nipple is drawn deeply into the shield.  If your baby is still seeming frustrated, you can fill the shield with some milk using either hand expression or a curve-tip syringe, to provide them with an \"instant reward\" and encourage continued suckling.  Just use the shield if it is needed;  " "It may be needed some times and not others, or on one breast and not the other.  Once he is doing well, you can work towards weaning from it completely.      Continue to nurse baby on cue, 8-12 times each day.  You may find that Tyrone needs to nurse a bit more often than when he was bottlefeeding, but this is a normal pattern. When you nurse, feed on one side until baby finishes swallowing.  Once swallowing slows, use breast compression to encourage more swallowing, but once there is no more active swallowing, and baby is either sleeping, coming off the breast, or just \"nibbling,\" it is OK to use a finger to take baby off the breast and move to the other breast.  Do the same on the other side.  Offer both breasts at each feeding.  It is more important to watch the baby than the clock!    Tyrone needs about 25 - 30 oz of milk each day to grow well, or around 3 - 3.5 oz each feeding if he is eating about 8 times/day.   If he still seems hungry and unhappy after nursing, offer him an extra ounce or so of milk.  This will likely decrease as he becomes more effective at breastfeeding.  You can slowly decrease your pumping as Tyrone is taking more milk from your breast.  If your breasts are very full and uncomfortable after feeding, just pump a small amount (like an ounce)--enough to stay comfortable.  If you would normally be pumping between feedings, you can work on slowly eliminating these.  Choose the pumping session that you most want to drop, and instead of pumping until 5 oz come, stop after 4 oz.  Then drop back by another ounce, and then another, until you can comfortably drop that pumping entirely.  You can drop back on each session this way, until you are just pumping once or twice, or for the amount of milk that you need for bottles.  If you choose to drink alcohol, the best way to do that when breastfeeding is just to take moderate amounts (about one drink--not enough to feel \"tipsy\"), and wait about two " "hours after a drink to feed your baby.  Alcohol is present in the breastmilk in the same amount that is present in the bloodstream, but also is metabolized out of the breastmilk in about 2 hours, just as it is from the blood.  You should wait about 2 hours before feeding for every drink that you have--for example, if you have one drink, you can nurse two hours later.  If you have two, you should wait four hours.  If there is no more alcohol in the bloodstream, there is no alcohol in the milk, so there is no need to \"pump and dump.\"  The best time to have alcohol is around the time of feeding, because there will not yet be any alcohol in your milk;  then wait a few hours before feeding baby again--this will likely be near to the time they are ready to feed again anyway.     Follow up with lactation as  needed, and pediatric provider as planned.  [facility] can be used for brief questions, but it's important to know that messages are not seen Friday through Sunday. If urgent help is needed, Monday through Friday you can call [phone] and one of our lactation consultants will get the message and respond; if you need a rapid response over a weekend or holiday, it is best to call your on-call maternity or pediatric provider.  Please feel free to schedule a return visit if the concern is more detailed;  telephone visits are also an option if you don't feel you need to be seen in person.   ___________      Donating Milk to the [place] [facility]  https://[email or web address]/donate-[email or web address]     ____________    Good breastfeeding resources:    Websites:  www.[email or web address]  [email or web address]/blog/  www.llli.org/breastfeeding-info/    Instagram:    [email or web address]  [email or web address]    Books:   The Womanly Art of Breastfeeding by [employer]  Breastfeeding Doesn't Need to Suck, by Julieth Diaz      For help with using baby carriers:  https://babywearingtwincities.org/   "

## 2024-02-22 ENCOUNTER — MEDICAL CORRESPONDENCE (OUTPATIENT)
Dept: HEALTH INFORMATION MANAGEMENT | Facility: CLINIC | Age: 34
End: 2024-02-22
Payer: COMMERCIAL

## 2024-02-24 ENCOUNTER — HEALTH MAINTENANCE LETTER (OUTPATIENT)
Age: 34
End: 2024-02-24

## 2024-03-20 ENCOUNTER — OFFICE VISIT (OUTPATIENT)
Dept: FAMILY MEDICINE | Facility: CLINIC | Age: 34
End: 2024-03-20
Payer: COMMERCIAL

## 2024-03-20 VITALS
SYSTOLIC BLOOD PRESSURE: 127 MMHG | DIASTOLIC BLOOD PRESSURE: 88 MMHG | HEART RATE: 80 BPM | RESPIRATION RATE: 18 BRPM | TEMPERATURE: 97.4 F | OXYGEN SATURATION: 98 %

## 2024-03-20 DIAGNOSIS — S86.819A STRAIN OF CALF MUSCLE, INITIAL ENCOUNTER: Primary | ICD-10-CM

## 2024-03-20 PROCEDURE — 99213 OFFICE O/P EST LOW 20 MIN: CPT | Performed by: PHYSICIAN ASSISTANT

## 2024-03-20 ASSESSMENT — PAIN SCALES - GENERAL: PAINLEVEL: SEVERE PAIN (6)

## 2024-03-20 NOTE — PATIENT INSTRUCTIONS
Ice topically to the area 20 minutes on each hour while awake.  Take precautions to avoid damage to the skin.  After 2 days, transition to ice topically 20 minutes 3 times per day.  After 2 days may add heat and alternate ice and heat.  Ibuprofen 600 mg (3 tablets) 3 times a day with food for analgesia and anti-inflammatory effect.  Do not use the ibuprofen if you have contraindications to using NSAIDs.  Injuries to the extremities may be elevated above level of the heart continuously for the first 2 days as much as possible.  Use of over-the-counter ace wrap or calf sleve that is well fitted but not too tight to cut off circulation.  Return to see primary care provider or return to clinic for reexamination and loyda-ray in 2 weeks if anything less than 100% resolution or return to pain-free weightbearing and full activities.

## 2024-03-20 NOTE — PROGRESS NOTES
Patient presents with:  pulled left calf: Pt was going up the stairs at her home and tripped- pulling calf  Pain score sitting is 3-4   Walking pain score 6-7  Pt took tylenol when accident happened around 1230pm      Clinical Decision Making:  Patient is treated for musculotendinous junction strain of the gastrocnemius.  Use of activity modification with protected weightbearing status with crutch ambulation, compression, topical ice and use of anti-inflammatory ibuprofen 3 tablets 3 times per day. Expected course of resolution and indication for return was gone over and questions were answered to patient/parent's satisfaction before discharge.        ICD-10-CM    1. Strain of calf muscle, initial encounter  S86.819A CANCELED: Crutches Order for DME - ONLY FOR DME          Patient Instructions   Ice topically to the area 20 minutes on each hour while awake.  Take precautions to avoid damage to the skin.  After 2 days, transition to ice topically 20 minutes 3 times per day.  After 2 days may add heat and alternate ice and heat.  Ibuprofen 600 mg (3 tablets) 3 times a day with food for analgesia and anti-inflammatory effect.  Do not use the ibuprofen if you have contraindications to using NSAIDs.  Injuries to the extremities may be elevated above level of the heart continuously for the first 2 days as much as possible.  Use of over-the-counter ace wrap or calf sleve that is well fitted but not too tight to cut off circulation.  Return to see primary care provider or return to clinic for reexamination and loyda-ray in 2 weeks if anything less than 100% resolution or return to pain-free weightbearing and full activities.            HPI:  Philip Pfeiffer is a 33 year old female who presents today for evaluation of left posterior calf pain.  Patient recounts past medical history for walking up the stairs and tripping.  Subsequently she felt a pull in the posterior calf.  She had immediate pain and difficulty with full  weightbearing and going through range of motion on the left lower extremity.  Did not have a break in the skin bleeding or ecchymoses reported.  Pain is made worse with going up or down stairs and dorsiflexing the left ankle and foot.  No reported pain on the ankle or the foot knee or hip of the ipsilateral side no contralateral right lower extremity injury to report.    History obtained from chart review and the patient.    Problem List:  2023: S/P  section  2021:  delivery delivered      Past Medical History:   Diagnosis Date    Varicose veins of lower extremity        Social History     Tobacco Use    Smoking status: Never     Passive exposure: Never    Smokeless tobacco: Never   Substance Use Topics    Alcohol use: Not Currently     Comment: about 4 glasses wine per week       Review of Systems  As above in HPI otherwise negative.    Vitals:    24 1515   BP: 127/88   BP Location: Right arm   Patient Position: Sitting   Cuff Size: Adult Regular   Pulse: 80   Resp: 18   Temp: 97.4  F (36.3  C)   TempSrc: Oral   SpO2: 98%       General: Patient is resting comfortably no acute distress is afebrile  HEENT: Head is normocephalic atraumatic   eyes are PERRL EOMI sclera anicteric   Musculoskeletal:  Inspection of the left knee shows that the patella is not ballotable and there is no effusion noted.  The lateral collateral ligaments are intact to valgus and varus stressing respectively.  Tear drawer sign is negative.  At endpoint on the ACL.  My attention is turned to the posterior calf.  There is no swelling or ecchymoses.  There is tenderness at the musculotendinous junction on the medial aspect of the calf.  No pain along the posterior aspect of the calcaneus or to the insertion on the calcaneus.  Catalan's test is negative.    At the end of the encounter, I discussed results, diagnosis, medications. Discussed red flags for immediate return to clinic/ER, as well as indications for follow  up if no improvement. Patient understood and agreed to plan. Patient was stable for discharge.

## 2024-04-10 ENCOUNTER — OFFICE VISIT (OUTPATIENT)
Dept: FAMILY MEDICINE | Facility: CLINIC | Age: 34
End: 2024-04-10
Payer: COMMERCIAL

## 2024-04-10 VITALS
DIASTOLIC BLOOD PRESSURE: 82 MMHG | BODY MASS INDEX: 30.99 KG/M2 | TEMPERATURE: 98.1 F | SYSTOLIC BLOOD PRESSURE: 121 MMHG | HEART RATE: 85 BPM | WEIGHT: 192 LBS | OXYGEN SATURATION: 97 % | RESPIRATION RATE: 16 BRPM

## 2024-04-10 DIAGNOSIS — R07.0 THROAT PAIN: Primary | ICD-10-CM

## 2024-04-10 DIAGNOSIS — Z20.818 STREP THROAT EXPOSURE: ICD-10-CM

## 2024-04-10 LAB
DEPRECATED S PYO AG THROAT QL EIA: NEGATIVE
GROUP A STREP BY PCR: NOT DETECTED

## 2024-04-10 PROCEDURE — 87651 STREP A DNA AMP PROBE: CPT | Performed by: PHYSICIAN ASSISTANT

## 2024-04-10 PROCEDURE — 99213 OFFICE O/P EST LOW 20 MIN: CPT | Performed by: PHYSICIAN ASSISTANT

## 2024-04-10 NOTE — PROGRESS NOTES
Patient presents with:  Throat Pain: X 2 days, coughing, congestion and runny nose, headaches, felt warm yesterday (has not taken temp)      Clinical Decision Making:  Strep test was obtained and was negative.  Culture is to follow.  COVID-19 screening test is negative at home.  Symptomatic care was gone over. Expected course of resolution and indication for return was gone over and questions were answered to patient/parent's satisfaction before discharge.        ICD-10-CM    1. Throat pain  R07.0 Streptococcus A Rapid Screen w/Reflex to PCR - Clinic Collect     Group A Streptococcus PCR Throat Swab      2. Strep throat exposure  Z20.818           Patient Instructions   Suggested increased rest increased fluids and bedside humidification  Over-the-counter Tylenol for comfort.  Follow packaging directions  Over-the-counter throat lozenges with benzocaine, such as Cepacol, may be used if indicated and is not a choking hazard based on age.    Follow packaging directions for throat lozenges.    Do not overuse the benzocaine as it will dry the throat and make it uncomfortable.  Use one lozenge per hour as directed on package and may use hard candies lemon drops etc. keep the saliva flowing until the next dosing interval after 1 hour.    Follow up with primary care provider if you do not get resolution with the course of treatment.  Return to walk-in care if complication or new symptoms arise in the interim.         HPI:  Philip Pfeiffer is a 33 year old female who presents today one day acute onset of sore throat and odynophagia.  Patient denies fever, chills, night sweats, fatigue, vomiting, diarrhea, skin rash, abdominal pain or urinary symptoms.  Covid testing is negative at home.    Known sick contacts for strep throat with daughter at .    Has not tried treatment for this over-the-counter.    History obtained from chart review and the patient.    Problem List:  2023: S/P  section  2021:   delivery delivered      Past Medical History:   Diagnosis Date    Varicose veins of lower extremity        Social History     Tobacco Use    Smoking status: Never     Passive exposure: Never    Smokeless tobacco: Never   Substance Use Topics    Alcohol use: Not Currently     Comment: about 4 glasses wine per week       Review of Systems  As above in HPI otherwise negative.    Vitals:    04/10/24 1035   BP: 121/82   Pulse: 85   Resp: 16   Temp: 98.1  F (36.7  C)   TempSrc: Oral   SpO2: 97%   Weight: 87.1 kg (192 lb)       General: Patient is resting comfortably no acute distress is afebrile  HEENT: Head is normocephalic atraumatic   eyes are PERRL EOMI sclera anicteric   TMs are clear bilaterally  Throat is with mild pharyngeal wall erythema and no exudate  No cervical lymphadenopathy present  LUNGS: Clear to auscultation bilaterally  HEART: Regular rate and rhythm  Skin: Without rash non-diaphoretic    Physical Exam      Labs:  Results for orders placed or performed in visit on 04/10/24   Streptococcus A Rapid Screen w/Reflex to PCR - Clinic Collect     Status: Normal    Specimen: Throat; Swab   Result Value Ref Range    Group A Strep antigen Negative Negative     Covid testing at home is negative.     At the end of the encounter, I discussed results, diagnosis, medications. Discussed red flags for immediate return to clinic/ER, as well as indications for follow up if no improvement. Patient understood and agreed to plan. Patient was stable for discharge.

## 2024-04-10 NOTE — PATIENT INSTRUCTIONS
Suggested increased rest increased fluids and bedside humidification  Over-the-counter Tylenol for comfort.  Follow packaging directions  Over-the-counter throat lozenges with benzocaine, such as Cepacol, may be used if indicated and is not a choking hazard based on age.    Follow packaging directions for throat lozenges.    Do not overuse the benzocaine as it will dry the throat and make it uncomfortable.  Use one lozenge per hour as directed on package and may use hard candies lemon drops etc. keep the saliva flowing until the next dosing interval after 1 hour.    Follow up with primary care provider if you do not get resolution with the course of treatment.  Return to walk-in care if complication or new symptoms arise in the interim.

## 2024-04-29 ENCOUNTER — MEDICAL CORRESPONDENCE (OUTPATIENT)
Dept: HEALTH INFORMATION MANAGEMENT | Facility: CLINIC | Age: 34
End: 2024-04-29
Payer: COMMERCIAL

## 2024-05-28 ENCOUNTER — VIRTUAL VISIT (OUTPATIENT)
Dept: OBGYN | Facility: CLINIC | Age: 34
End: 2024-05-28
Payer: COMMERCIAL

## 2024-05-28 ENCOUNTER — TELEPHONE (OUTPATIENT)
Dept: BEHAVIORAL HEALTH | Facility: CLINIC | Age: 34
End: 2024-05-28

## 2024-05-28 PROCEDURE — 99213 OFFICE O/P EST LOW 20 MIN: CPT | Mod: 95 | Performed by: OBSTETRICS & GYNECOLOGY

## 2024-05-28 PROCEDURE — G2211 COMPLEX E/M VISIT ADD ON: HCPCS | Mod: 95 | Performed by: OBSTETRICS & GYNECOLOGY

## 2024-05-28 RX ORDER — SERTRALINE HYDROCHLORIDE 25 MG/1
25 TABLET, FILM COATED ORAL DAILY
Qty: 7 TABLET | Refills: 0 | Status: SHIPPED | OUTPATIENT
Start: 2024-05-28 | End: 2024-06-25

## 2024-05-28 ASSESSMENT — PATIENT HEALTH QUESTIONNAIRE - PHQ9
SUM OF ALL RESPONSES TO PHQ QUESTIONS 1-9: 9
SUM OF ALL RESPONSES TO PHQ QUESTIONS 1-9: 9
10. IF YOU CHECKED OFF ANY PROBLEMS, HOW DIFFICULT HAVE THESE PROBLEMS MADE IT FOR YOU TO DO YOUR WORK, TAKE CARE OF THINGS AT HOME, OR GET ALONG WITH OTHER PEOPLE: SOMEWHAT DIFFICULT

## 2024-05-28 NOTE — TELEPHONE ENCOUNTER
Patient had appointment with her OBGYN care team. [facility] services were requested. No immediate safety/risk issues were reported or identified. Explained the role of the [facility] and provided informational handout and contact information for the [facility].     Pt shared over the past month her mental health has been declining. She is having feelings of sadness and more intrusive thoughts. She shared that she experienced anxiety following for 1st child but this is different. Appt made for 6/17.    Any Parker [facility], Behavioral Health Clinician

## 2024-06-14 ASSESSMENT — PATIENT HEALTH QUESTIONNAIRE - PHQ9
SUM OF ALL RESPONSES TO PHQ QUESTIONS 1-9: 4
10. IF YOU CHECKED OFF ANY PROBLEMS, HOW DIFFICULT HAVE THESE PROBLEMS MADE IT FOR YOU TO DO YOUR WORK, TAKE CARE OF THINGS AT HOME, OR GET ALONG WITH OTHER PEOPLE: SOMEWHAT DIFFICULT
SUM OF ALL RESPONSES TO PHQ QUESTIONS 1-9: 4

## 2024-06-17 ENCOUNTER — VIRTUAL VISIT (OUTPATIENT)
Dept: BEHAVIORAL HEALTH | Facility: CLINIC | Age: 34
End: 2024-06-17
Payer: COMMERCIAL

## 2024-06-17 DIAGNOSIS — F43.21 ADJUSTMENT DISORDER WITH DEPRESSED MOOD: Primary | ICD-10-CM

## 2024-06-17 PROCEDURE — 90791 PSYCH DIAGNOSTIC EVALUATION: CPT | Mod: 95 | Performed by: COUNSELOR

## 2024-06-17 ASSESSMENT — COLUMBIA-SUICIDE SEVERITY RATING SCALE - C-SSRS
2. HAVE YOU ACTUALLY HAD ANY THOUGHTS OF KILLING YOURSELF?: NO
TOTAL  NUMBER OF INTERRUPTED ATTEMPTS LIFETIME: NO
6. HAVE YOU EVER DONE ANYTHING, STARTED TO DO ANYTHING, OR PREPARED TO DO ANYTHING TO END YOUR LIFE?: NO
TOTAL  NUMBER OF ABORTED OR SELF INTERRUPTED ATTEMPTS LIFETIME: NO
1. HAVE YOU WISHED YOU WERE DEAD OR WISHED YOU COULD GO TO SLEEP AND NOT WAKE UP?: NO
ATTEMPT LIFETIME: NO

## 2024-06-17 NOTE — PROGRESS NOTES
"[facility] - Integrated Behavioral Health    Provider Name:  Darling Parker     Credentials:  MSW, LICSW    PATIENT'S NAME: Philip Pfeiffer  PREFERRED NAME:   PRONOUNS: She/her  MRN: [id number]  : 1990  ADDRESS: [address] [place] [address] [place] [address]  ACCT. NUMBER:  [id number]  DATE OF SERVICE: 24  START TIME: 10am  END TIME: 11am  PREFERRED PHONE: [phone]  May we leave a program related message: Yes  SERVICE MODALITY:  In-person    UNIVERSAL ADULT Mental Health DIAGNOSTIC ASSESSMENT    Identifying Information:  Patient is a 33 year old, . The pronoun use throughout this assessment reflects the patient's chosen pronoun. Patient was referred for an assessment by self. Patient attended the session alone.    Chief Complaint:   The reason for seeking services at this time is: \"Postpartum Depression and anxiety\".  The problem(s) began 24.    Patient has not attempted to resolve these concerns in the past.    Social/Family History:  Patient reported they grew up in [place], [place]. They were raised by biological parents.  Parents were always together. Pt has 3 siblings. Patient reported that their childhood was fun, playing a lot of sports was a big part of her life. Patient described their current relationships with family of origin as good, she is really close with her sisters and [place]. Her youngest brother and parents are still in [place].    The patient describes their cultural background as . Cultural influences and impact on patient's life structure, values, norms, and healthcare: White, small town [employer], [place] family. Pt shared she is not Pentecostal now. Contextual influences on patient's health include: none identified. These factors will be addressed in the Preliminary Treatment plan. Patient identified their preferred language to be English. Patient reported they does not need the assistance of an  or other " support involved in therapy.     Patient reported had no significant delays in developmental tasks. Patient's highest education level was college graduate in wellness and sports management. Patient identified the following learning problems: none reported.  Modifications will not be used to assist communication in therapy. Patient reports they are able to understand written materials.    Patient's current relationship status is  for 6 years, together for 10 years.   Patient identified their sexual orientation as heterosexual. Patient reported having 2 child(cata). Patient identified parents; siblings; spouse as part of their support system.  Patient identified the quality of these relationships as stable and meaningful.      Patient's current living/housing situation involves staying in own home/apartment.  The immediate members of family and household include Luke, 34, and 2 children (2.5 and baby) and they report that housing is stable.    Patient is currently unemployed, stay at home mother.  Patient reports their finances are obtained through spouse. Patient does identify finances as a current stressor.      Patient reported that they have not been involved with the legal system. Patient does not report being under probation/ parole/ jurisdiction.    Patient's Strengths and Limitations:  Patient identified the following strengths or resources that will help them succeed in treatment: commitment to health and well being, friends / good social support, family support, motivation, strong social skills, and work ethic. Things that may interfere with the patient's success in treatment include: none identified.     Assessments:  The following assessments were completed by patient for this visit:  PHQ9:       4/28/2022     2:28 PM 5/3/2023     9:33 AM 8/23/2023    10:59 AM 1/23/2024    10:31 AM 5/23/2024    12:31 PM 5/28/2024     2:22 PM 6/14/2024    10:04 AM   PHQ-9 SCORE   PHQ-9 Total Score MyChart 1  (Minimal depression)    10 (Moderate depression) 9 (Mild depression) 4 (Minimal depression)   PHQ-9 Total Score 1 2 1 0 10 9 4     GAD7:       4/28/2022     2:29 PM 5/3/2023     9:33 AM 5/23/2024    12:28 PM   ISAEL-7 SCORE   Total Score 3 (minimal anxiety)  12 (moderate anxiety)   Total Score 3 0 12     CAGE-AID:       4/25/2022     2:08 PM   CAGE-AID Total Score   Total Score 0   Total Score MyChart 0 (A total score of 2 or greater is considered clinically significant)     PROMIS 10-Global Health (only subscores and total score):       4/28/2022     2:30 PM 6/14/2024    10:17 AM   PROMIS-10 Scores Only   Global Mental Health Score 14 14   Global Physical Health Score 17 15   PROMIS TOTAL - SUBSCORES 31 29     Archer Suicide Severity Rating Scale (Lifetime/Recent)      8/10/2021     2:00 AM 4/27/2022     1:13 PM 6/17/2024    10:00 AM   Archer Suicide Severity Rating (Lifetime/Recent)   Q1 Wished to be Dead (Past Month) no     Q2 Suicidal Thoughts (Past Month) no     Q1 Wish to be Dead (Lifetime)  N N   Q2 Non-Specific Active Suicidal Thoughts (Lifetime)  N N   Actual Attempt (Lifetime)  N N   Has subject engaged in non-suicidal self-injurious behavior? (Lifetime)  N N   Interrupted Attempts (Lifetime)  N N   Aborted or Self-Interrupted Attempt (Lifetime)  N N   Preparatory Acts or Behavior (Lifetime)  N N   Calculated C-SSRS Risk Score (Lifetime/Recent)  No Risk Indicated No Risk Indicated       Personal and Family Medical History:  Patient does not report a family history of mental health concerns.  Patient reports family history includes Diabetes Type 2  in her mother; Obesity in her mother; Sjogren's in her maternal grandmother and mother. Brother with depression. 2 sisters with postpartum mood concerns. Father past struggles with alcohol, sober now.    Patient does report Mental Health Diagnosis and/or Treatment.  Patient Patient reported the following previous diagnoses which include(s): none reported.   Patient reported symptoms began following the birth of daughter. Patient has received mental health services in the past:  therapy for a couple months .  Psychiatric Hospitalizations: None.  Patient denies a history of civil commitment.  Patient is not receiving other mental health services.     Patient has had a physical exam to rule out medical causes for current symptoms.  Date of last physical exam was within the past year. Client was encouraged to follow up with PCP if symptoms were to develop. The patient has a [facility] Primary Care Provider, who is named Queenie Zurita..  Patient reports no current medical and/or dental concerns. Patient denies any issues with pain.  There are not significant appetite / nutritional concerns / weight changes.   Patient does report a history of head injury / trauma / cognitive impairment.  3 concussions in college due to basketball    Patient reports current meds as:   Current Outpatient Medications   Medication Sig Dispense Refill    Prenatal Vit-Fe Fumarate-FA (PRENATAL VITAMIN PO)       sertraline (ZOLOFT) 25 MG tablet Take 1 tablet (25 mg) by mouth daily 7 tablet 0    sertraline (ZOLOFT) 50 MG tablet Take 1 tablet (50 mg) by mouth daily 90 tablet 3     No current facility-administered medications for this visit.       Medication Adherence:  Patient reports taking.      Patient Allergies:    Allergies   Allergen Reactions    Vancomycin Hives and Itching       Medical History:    Past Medical History:   Diagnosis Date    Varicose veins of lower extremity          Current Mental Status Exam:   Appearance:  Appropriate    Eye Contact:  Good   Psychomotor:  Normal       Gait / station:  N/A  Attitude / Demeanor: Cooperative   Speech      Rate / Production: Normal/ Responsive      Volume:  Normal  volume      Language:  intact  Mood:   Depressed   Affect:   Appropriate    Thought Content: Clear   Thought Process: Coherent  Logical       Associations: No loosening of  associations  Insight:   Good   Judgment:  Intact   Orientation:  All  Attention/concentration: Good    Substance Use:  Patient did report a family history of substance use concerns; see medical history section for details.  Patient has not received chemical dependency treatment in the past.  Patient has not ever been to detox.      Patient is not currently receiving any chemical dependency treatment.           Substance History of use Age of first use Date of last use     Pattern and duration of use (include amounts and frequency)   Alcohol currently use   18 05/11/24 REPORTS SUBSTANCE USE: reports using substance 1 times per week and has 1 -2 at a time.   Patient reports heaviest use was in college.   Cannabis   never used     REPORTS SUBSTANCE USE: N/A     Amphetamines   never used     REPORTS SUBSTANCE USE: N/A   Cocaine/crack    never used       REPORTS SUBSTANCE USE: N/A   Hallucinogens never used         REPORTS SUBSTANCE USE: N/A   Inhalants never used         REPORTS SUBSTANCE USE: N/A   Heroin never used         REPORTS SUBSTANCE USE: N/A   Other Opiates never used     REPORTS SUBSTANCE USE: N/A   Benzodiazepine   never used     REPORTS SUBSTANCE USE: N/A   Barbiturates never used     REPORTS SUBSTANCE USE: N/A   Over the counter meds never used     REPORTS SUBSTANCE USE: N/A   Caffeine currently use 15   REPORTS SUBSTANCE USE: reports using substance 4 times per day and has 1   at a time.   Patient reports heaviest use is current use.   Nicotine  never used     REPORTS SUBSTANCE USE: N/A   Other substances not listed above:  Identify:  never used     REPORTS SUBSTANCE USE: N/A     Patient reported the following problems as a result of their substance use: no problems, not applicable.    Substance Use: No symptoms    Based on the negative CAGE score and clinical interview there  are not indications of drug or alcohol abuse.    Significant Losses / Trauma / Abuse / Neglect Issues:   Patient did not serve in  the .  There are not indications or report of significant loss, trauma, abuse or neglect.  Concerns for possible neglect are not present.     Safety Assessment:   Patient denies current homicidal ideation and behaviors.  Patient denies current self-injurious ideation and behaviors.    Patient denied risk behaviors associated with substance use.  Patient denies any high risk behaviors associated with mental health symptoms.  Patient reports the following current concerns for their personal safety: None.  Patient reports there are not firearms in the house.     History of Safety Concerns:  Patient denied a history of homicidal ideation.     Patient denied a history of personal safety concerns.    Patient denied a history of assaultive behaviors.    Patient denied a history of sexual assault behaviors.     Patient denied a history of risk behaviors associated with substance use.  Patient denies any history of high risk behaviors associated with mental health symptoms.  Patient reports the following protective factors: dedication to family or friends; safe and stable environment; purpose; secure attachment; adherence with prescribed medication; living with other people; daily obligations; structured day; effective problem solving skills; commitment to well being; sense of meaning; sense of personal control or determination    Risk Plan: See Recommendations for Safety and Risk Management Plan    Review of Symptoms per patient report:  Depression: Excessive or inappropriate guilt, Difficulties concentrating, Ruminations, Irritability, Feeling sad, down, or depressed, Frequent crying, and Anger outbursts  Britt:  No Symptoms  Psychosis: No Symptoms  Anxiety: Physical complaints, such as headaches, stomachaches, muscle tension  Panic:  Palpitations, Shortness of breath, and Sense of impending doom a few in college  Post Traumatic Stress Disorder:  No Symptoms   Eating Disorder: No Symptoms  ADD / ADHD:  No  symptoms  Conduct Disorder: No symptoms  Autism Spectrum Disorder: No symptoms  Obsessive Compulsive Disorder: No Symptoms    Patient reports the following compulsive behaviors and treatment history:  none .      Diagnostic Criteria:   Adjustment Disorder  A. The development of emotional or behavioral symptoms in response to an identifiable stressor(s) occurring within 3 months of the onset of the stressor(s)  B. These symptoms or behaviors are clinically significant, as evidenced by one or both of the following:       - Marked distress that is out of proportion to the severity/intensity of the stressor (with consideration for external context & culture)  C. The stress-related disturbance does not meet criteria for another disorder & is not not an exacerbation of another mental disorder  D. The symptoms do not represent normal bereavement  E. Once the stressor or its consequences have terminated, the symptoms do not persist for more than an additional 6 months       * Adjustment Disorder with Depressed Mood: The predominant manifestations are symptoms such as low mood, tearfulness, or feelings of hopelessness    Functional Status:  Patient reports the following functional impairments:  childcare / parenting, home life with family, and relationship(s).     Nonprogrammatic care:  Patient is requesting basic services to address current mental health concerns.    Clinical Summary:  1. Psychosocial, Cultural and Contextual Factors: Pt is currently  with 2 children. 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  2. Principal DSM5 Diagnoses  (Sustained by DSM5 Criteria Listed Above):   Adjustment Disorders  309.0 (F43.21) With depressed mood.  3. Other Diagnoses that is relevant to services:   none.  4. Provisional Diagnosis:  none.  5. Prognosis: Return to Baseline Functioning, Expect Improvement, and Relieve Acute Symptoms.  6. Likely consequences of symptoms if not treated: worsening of symptoms.  7. Client  strengths include:  caring, educated, empathetic, goal-focused, good listener, insightful, intelligent, motivated, open to learning, open to suggestions / feedback, responsible parent, support of family, friends and providers, supportive, wants to learn, willing to ask questions, willing to relate to others, and work history .     Recommendations:     1. Plan for Safety and Risk Management:   Safety and Risk: Recommended that patient call 911 or go to the local ED should there be a change in any of these risk factors..          Report to child / adult protection services was NA.     2. Patient's identified no specific concerns for identity needing to be discussed in treatment at this time.     3. Initial Treatment will focus on:    Depressed Mood - build coping skills .     4. Resources/Service Plan:    services are not indicated.   Modifications to assist communication are not indicated.   Additional disability accommodations are not indicated.      5. Collaboration:   Collaboration / coordination of treatment will be initiated with the following  support professionals: primary care physician.      6.  Referrals:   The following referral(s) will be initiated: none.       A Release of Information has been obtained for the following: none.     Clinical Substantiation/medical necessity for the above recommendations:   Pt will benefit from therapeutic intervention to improve low mood.    7. JUNIOR:    JUNIOR:  Discussed the general effects of drugs and alcohol on health and well-being. Provider gave patient printed information about the  effects of chemical use on their health and well being. Recommendations:  none .     8. Records:   These were reviewed at time of assessment.   Information in this assessment was obtained from the medical record and  provided by patient who is a good historian. Patient will have open access to their mental health medical record.    9.   Interactive Complexity: No    10. Safety  Plan: Patient denied any current/recent/lifetime history of suicidal ideation and/or behaviors. Recommended that patient call 911 or go to the local ED should there be a change in any of these risk factors.       Provider Name/ Credentials:  ZENON Villeda, LICSW  June 17, 2024

## 2024-06-25 ENCOUNTER — VIRTUAL VISIT (OUTPATIENT)
Dept: OBGYN | Facility: CLINIC | Age: 34
End: 2024-06-25
Payer: COMMERCIAL

## 2024-06-25 PROCEDURE — G2211 COMPLEX E/M VISIT ADD ON: HCPCS | Mod: 95 | Performed by: OBSTETRICS & GYNECOLOGY

## 2024-06-25 PROCEDURE — 99213 OFFICE O/P EST LOW 20 MIN: CPT | Mod: 95 | Performed by: OBSTETRICS & GYNECOLOGY

## 2024-06-25 NOTE — PROGRESS NOTES
"Philip is a 33 year old who is being evaluated via a billable video visit.    How would you like to obtain your AVS? MyChart  If the video visit is dropped, the invitation should be resent by: Send to e-mail at: [email or web address]  Will anyone else be joining your video visit? No      Assessment & Plan     Postpartum mood disturbance  Doing ok - some improvement.   Discussed options - increase zoloft or maintain current dose. Would like to maintain current dose for now. Will send e-visit if she would like to go up on dose. Discussed 75 mg next step, mostly likely.   Precautions reviewed.   Has [facility] follow-up scheduled.           BMI  Estimated body mass index is 30.99 kg/m  as calculated from the following:    Height as of 23: 1.676 m (5' 6\").    Weight as of 4/10/24: 87.1 kg (192 lb).             No follow-ups on file.    Subjective   Philip is a 33 year old, presenting for the following health issues:  Follow Up    HPI   Presents for follow-up  mood disorder.   Last seen 24  Started zoloft at that time.   Feels some improvement - unsure if it's fully kicked in yet. Felt initial burst of improvement from relief that she was getting help but now feels like she slid back a little. Has life stuff going on too - DANILO with stage 4 cancer is in the hospital.   Saw [facility] for the intake - has follow-up scheduled.         2024     2:22 PM 2024    10:04 AM 2024     2:01 PM   PHQ   PHQ-9 Total Score 9 4 2   Q9: Thoughts of better off dead/self-harm past 2 weeks More than half the days Several days Several days   F/U: Thoughts of suicide or self-harm Yes Yes Yes   F/U: Self harm-plan No No No   F/U: Self-harm action No No No   F/U: Safety concerns No No No         5/3/2023     9:33 AM 2024    12:28 PM 2024     2:02 PM   ISAEL-7 SCORE   Total Score  12 (moderate anxiety) 7 (mild anxiety)   Total Score 0 12 7         Past Medical History:   Diagnosis Date    Varicose veins of lower extremity  "       Past Surgical History:   Procedure Laterality Date     SECTION N/A 2021    Procedure:  SECTION;  Surgeon: Jamila Cherry MD;  Location: [facility] L[facility]     SECTION, TUBAL LIGATION, COMBINED Bilateral 2023    Procedure:  section, tubal ligation, combined;  Surgeon: Jamila Cherry MD;  Location: [facility] L[facility]    ORTHOPEDIC SURGERY Right     Right 2nd and 3rd metatarsal ORIF age 14, with subsequent osteomyelitis    TONSILLECTOMY & ADENOIDECTOMY      wisdom teeth         Family History   Problem Relation Age of Onset    Diabetes Type 2  Mother     Obesity Mother     Sjogren's Mother     Sjogren's Maternal Grandmother        Social History     Socioeconomic History    Marital status:      Spouse name: Not on file    Number of children: Not on file    Years of education: Not on file    Highest education level: Not on file   Occupational History    Not on file   Tobacco Use    Smoking status: Never     Passive exposure: Never    Smokeless tobacco: Never   Vaping Use    Vaping status: Never Used   Substance and Sexual Activity    Alcohol use: Not Currently     Comment: about 4 glasses wine per week    Drug use: No    Sexual activity: Yes     Partners: Male   Other Topics Concern    Not on file   Social History Narrative    Not on file     Social Determinants of Health     Financial Resource Strain: Not on file   Food Insecurity: Not on file   Transportation Needs: Not on file   Physical Activity: Not on file   Stress: Not on file   Social Connections: Not on file   Interpersonal Safety: Not on file   Housing Stability: Not on file       Current Outpatient Medications   Medication Sig Dispense Refill    Prenatal Vit-Fe Fumarate-FA (PRENATAL VITAMIN PO)       sertraline (ZOLOFT) 50 MG tablet Take 1 tablet (50 mg) by mouth daily 90 tablet 3    sertraline (ZOLOFT) 25 MG tablet Take 1 tablet (25 mg) by mouth daily 7 tablet 0     No current facility-administered  medications for this visit.          Allergies   Allergen Reactions    Vancomycin Hives and Itching           Review of Systems  Constitutional, HEENT, cardiovascular, pulmonary, gi and gu systems are negative, except as otherwise noted.      Objective           Vitals:  No vitals were obtained today due to virtual visit.    Physical Exam   GENERAL: alert and no distress  EYES: Eyes grossly normal to inspection.  No discharge or erythema, or obvious scleral/conjunctival abnormalities.  RESP: No audible wheeze, cough, or visible cyanosis.    SKIN: Visible skin clear. No significant rash, abnormal pigmentation or lesions.  NEURO: Cranial nerves grossly intact.  Mentation and speech appropriate for age.  PSYCH: Appropriate affect, tone, and pace of words          Video-Visit Details    Type of service:  Video Visit   Originating Location (pt. Location): Home    Distant Location (provider location):  On-site  Platform used for Video Visit: Randall  Signed Electronically by: Jamila Cherry MD

## 2024-07-08 ENCOUNTER — VIRTUAL VISIT (OUTPATIENT)
Dept: BEHAVIORAL HEALTH | Facility: CLINIC | Age: 34
End: 2024-07-08
Payer: COMMERCIAL

## 2024-07-08 DIAGNOSIS — F43.21 ADJUSTMENT DISORDER WITH DEPRESSED MOOD: Primary | ICD-10-CM

## 2024-07-08 PROCEDURE — 90834 PSYTX W PT 45 MINUTES: CPT | Mod: 95 | Performed by: COUNSELOR

## 2024-07-08 NOTE — PROGRESS NOTES
[facility] - Integrated Behavioral Health  July 8, 2024      Behavioral Health Clinician Progress Note    Patient Name: Philip Pfeiffer           Service Type:  Individual      Service Location:   MyChart / Email (patient reached)     Session Start Time: 2:05pm Session End Time: 2:55pm      Session Length: 38 - 52      Attendees: Patient     Service Modality:  Video Visit:      Provider verified identity through the following two step process.  Patient provided:  Patient is known previously to provider    Telemedicine Visit: The patient's condition can be safely assessed and treated via synchronous audio and visual telemedicine encounter.      Reason for Telemedicine Visit: Patient has requested telehealth visit    Originating Site (Patient Location): Patient's home    Distant Site (Provider Location): [facility]    Consent:  The patient/guardian has verbally consented to: the potential risks and benefits of telemedicine (video visit) versus in person care; bill my insurance or make self-payment for services provided; and responsibility for payment of non-covered services.     Patient would like the video invitation sent by:  My Chart    Mode of Communication:  Video Conference via [facility]    Distant Location (Provider):  On-site    As the provider I attest to compliance with applicable laws and regulations related to telemedicine.    Visit Activities (Refresh list every visit): [facility] Only    Diagnostic Assessment Date: 6/17/2024  Treatment Plan Date: July 8, 2024    Assessments:  The following assessments were completed by patient for this visit:    Previous PHQ-9:       5/28/2024     2:22 PM 6/14/2024    10:04 AM 6/25/2024     2:01 PM   PHQ-9 SCORE   PHQ-9 Total Score Maynor 9 (Mild depression) 4 (Minimal depression) 2 (Minimal depression)   PHQ-9 Total Score 9 4 2     Previous ISAEL-7:       5/3/2023     9:33 AM 5/23/2024    12:28 PM 6/25/2024     2:02 PM    ISAEL-7 SCORE   Total Score  12 (moderate anxiety) 7 (mild anxiety)   Total Score 0 12 7       DATA  Extended Session (60+ minutes): No  Interactive Complexity: No  Crisis: No  H Patient: No    Treatment Objective(s) Addressed in This Session:  Target Behavior(s):  symptom management    Adjustment Difficulties: will develop coping/problem-solving skills to facilitate more adaptive adjustment    Current Stressors / Issues:  Her father-in-law just had surgery for his liver cancer (which had also spread to his lymphoids and lungs). Everything went well but with some concerns they they were able to fix. He started chemo today.     Her sister had a hysterectomy due to major issues. Pt is supporting her and her children with her healing. Pt shared she had her tubes ties after the birth of her son and she was able to connect with her sister about this experience. Her nieces are having surgeries to place tubes in their ears this week and she is helping to babysit through this.    Her  got hurt this weekend while playing with his nephew, he has a slight fracture in his collar bone. He has a follow up with ortho about treatment plan.     Pt shared she feels like she is handling everything going on fairly well. Pt shared that she felt proud of herself for being able to get the kids out of the house today, problem solve the situation, and remain calm throughout it.    Pt shared she worked with her  to find time to care for herself. She was getting 5 minutes to herself when her  got home from work. One night a week she went out of her own for an evening when her  put the kids to bed. Reflected on what was helpful for herself and what increased her guilt. She feels like having the 5 minutes in the evening helps her to have a refresh for her patience at the end of the day. In the daytime with her kid's naps, she is able to get a workout in and that is helpful for her mental health through out the day.      Progress on Treatment Objective(s) / Homework:  New Objective established this session - PREPARATION (Decided to change - considering how); Intervened by negotiating a change plan and determining options / strategies for behavior change, identifying triggers, exploring social supports, and working towards setting a date to begin behavior change    Motivational Interviewing    MI Intervention: Co-Developed Goal: build coping skills, Expressed Empathy/Understanding, Supported Autonomy, Collaboration, Evocation, Permission to raise concern or advise, Open-ended questions, and Reflections: simple and complex     Change Talk Expressed by the Patient: Ability to change Reasons to change Need to change    Provider Response to Change Talk: E - Evoked more info from patient about behavior change, A - Affirmed patient's thoughts, decisions, or attempts at behavior change, R - Reflected patient's change talk, and S - Summarized patient's change talk statements  CBT:  Discussed common cognitive distortions identified them in patient's life, Explored ways to challenge, replace, and act against these cognitions  SKILLS TRAINING: Explored skills useful to client in current situation Skills include assertiveness, communication, conflict management, problem-solving, relaxation, etc.  SOLUTION FOCUSED: Explored patterns in patient's relationships and discussed options for new behaviors, Explored patterns in patient's actions and choices and discussed options for new behaviors  BEHAVIORAL ACTIVATION:  Discussed steps patient can take to become more involved in meaningful activity, Identified barriers to these activities and explored possible solutions    Assessments completed prior to visit:  The following assessments were completed by patient for this visit:  PROMIS 10-Global Health (only subscores and total score):       4/28/2022     2:30 PM 6/14/2024    10:17 AM   PROMIS-10 Scores Only   Global Mental Health Score 14 14    Global Physical Health Score 17 15   PROMIS TOTAL - SUBSCORES 31 29       Care Plan review completed: Yes    Medication Review:  No changes to current psychiatric medication(s)    Medication Compliance:  Yes    Changes in Health Issues:   None reported    Chemical Use Review:   Substance Use: Chemical use reviewed, no active concerns identified      Tobacco Use: No current tobacco use.      Assessment: Current Emotional / Mental Status (status of significant symptoms):  Risk status (Self / Other harm or suicidal ideation)  Patient denies a history of suicidal ideation, suicide attempts, self-injurious behavior, homicidal ideation, homicidal behavior, and and other safety concerns  Patient denies current fears or concerns for personal safety.  Patient denies current or recent suicidal ideation or behaviors.  Patient denies current or recent homicidal ideation or behaviors.  Patient denies current or recent self injurious behavior or ideation.  Patient denies other safety concerns.  A safety and risk management plan has not been developed at this time, however patient was encouraged to call [place] [phone] should there be a change in any of these risk factors.  [place] Suicide Severity Rating Scale (Lifetime/Recent)      8/10/2021     2:00 AM 4/27/2022     1:13 PM 6/17/2024    10:00 AM   [place] Suicide Severity Rating (Lifetime/Recent)   Q1 Wished to be Dead (Past Month) no     Q2 Suicidal Thoughts (Past Month) no     Q1 Wish to be Dead (Lifetime)  N N   Q2 Non-Specific Active Suicidal Thoughts (Lifetime)  N N   Actual Attempt (Lifetime)  N N   Has subject engaged in non-suicidal self-injurious behavior? (Lifetime)  N N   Interrupted Attempts (Lifetime)  N N   Aborted or Self-Interrupted Attempt (Lifetime)  N N   Preparatory Acts or Behavior (Lifetime)  N N   Calculated C-SSRS Risk Score (Lifetime/Recent)  No Risk Indicated No Risk Indicated         Appearance:   Appropriate   Eye Contact:   Good    Psychomotor Behavior: Normal   Attitude:   Cooperative   Orientation:   All  Speech   Rate / Production: Normal    Volume:  Normal   Mood:    Normal  Affect:    Appropriate   Thought Content:  Clear   Thought Form:  Coherent  Logical   Insight:    Good     Diagnoses:  1. Adjustment disorder with depressed mood        Collateral Reports Completed:  Not Applicable    Plan: (Homework, other):  Patient was given information about behavioral services and encouraged to schedule a follow up appointment with the clinic [facility] in 3 weeks. [facility] provided information about mental health symptoms and treatment options , deep Breathing Strategies to practice when experiencing depression, and information on mindfulness and exercises to practice.  She was also given CD Recommendations: No indications of CD issues.      ZENON Villeda, [facility]  Behavioral Health Clinician  [facility]      ______________________________________________________________________    Integrated Primary Care Behavioral Health Treatment Plan    Patient's Name: Philip Pfeiffer  YOB: 1990    Date of Creation: July 8, 2024  Date Treatment Plan Last Reviewed/Revised: N/A    DSM5 Diagnoses: Adjustment Disorders  309.0 (F43.21) With depressed mood  Psychosocial / Contextual Factors: Pt is currently  with 2 children. 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   PROMIS (reviewed every 90 days):   PROMIS 10-Global Health (only subscores and total score):       4/28/2022     2:30 PM 6/14/2024    10:17 AM   PROMIS-10 Scores Only   Global Mental Health Score 14 14   Global Physical Health Score 17 15   PROMIS TOTAL - SUBSCORES 31 29         Referral / Collaboration:  Referral to another professional/service is not indicated at this time..    Anticipated number of session for this episode of care: 3-6  Anticipation frequency of session: Every other week  Anticipated Duration of each session: 38-52 minutes  Treatment plan will be  reviewed in 90 days or when goals have been changed.       MeasurableTreatment Goal(s) related to diagnosis / functional impairment(s)  Goal:  Philip will reduce symptoms of depression and suicidal thinking and increase life functioning; effectively reduce depressive symptoms as evidenced by a reduced PHQ9 score of 5 or less with occurrence of several days or less.    Objective #A:  Philip will experience a reduction in depressed mood, will develop more effective coping skills to manage depressive symptoms and will develop healthy cognitive patterns and beliefs   Client will increase interest, engagement, and pleasure in doing things  Decrease frequency and intensity of feeling down, depressed, hopeless  Identify negative self-talk and behaviors: challenge core beliefs, myths, and actions  Status: New - Date:    July 8, 2024    Objective #B: Philip will increase ability to function adaptively and will continue to take medications as prescribed / participate in supportive activities and services   Client will Increase interest, engagement, and pleasure in doing things  Improve quantity and quality of night time sleep / decrease daytime naps  Feel less tired and more energy during the day    Improve diet, appetite, mindful eating, and / or meal planning  Identify negative self-talk and behaviors: challenge core beliefs, myths, and actions  Improve concentration, focus, and mindfulness in daily activities .  Status: New - Date:    July 8, 2024      Intervention(s)  Psycho-education regarding mental health diagnoses and treatment options    Skills training  Explore skills useful to client in current situation  Skills include assertiveness, communication, conflict management, problem-solving, relaxation, etc.    Solution-Focused Therapy  Explore patterns in patient's relationships and discussed options for new behaviors  Explore patterns in patient's actions and choices and discussed options for new  behaviors    Cognitive-behavioral Therapy  Discuss common cognitive distortions, identified them in patient's life  Explore ways to challenge, replace, and act against these cognitions    Acceptance and Commitment Therapy  Explore and identified important values in patient's life  Discuss ways to commit to behavioral activation around these values    Psychodynamic psychotherapy  Discuss patient's emotional dynamics and issues and how they impact behaviors  Explore patient's history of relationships and how they impact present behaviors  Explore how to work with and make changes in these schemas and patterns    Behavioral Activation  Discuss steps patient can take to become more involved in meaningful activity  Identify barriers to these activities and explored possible solutions    Mindfulness-Based Strategies  Discuss skills based on development and application of mindfulness  Skills drawn from dialectical behavior therapy, mindfulness-based stress reduction, mindfulness-based cognitive therapy, etc.        Patient has reviewed and agreed to the above plan.      Any Parker, [facility]ES[facility]  July 8, 2024

## 2024-07-19 ENCOUNTER — OFFICE VISIT (OUTPATIENT)
Dept: FAMILY MEDICINE | Facility: CLINIC | Age: 34
End: 2024-07-19
Payer: COMMERCIAL

## 2024-07-19 VITALS
SYSTOLIC BLOOD PRESSURE: 130 MMHG | HEART RATE: 76 BPM | TEMPERATURE: 97.6 F | DIASTOLIC BLOOD PRESSURE: 86 MMHG | RESPIRATION RATE: 20 BRPM | OXYGEN SATURATION: 97 %

## 2024-07-19 DIAGNOSIS — H92.01 RIGHT EAR PAIN: Primary | ICD-10-CM

## 2024-07-19 PROCEDURE — 99213 OFFICE O/P EST LOW 20 MIN: CPT | Performed by: FAMILY MEDICINE

## 2024-07-19 RX ORDER — AMOXICILLIN 500 MG/1
500 CAPSULE ORAL 2 TIMES DAILY
Qty: 20 CAPSULE | Refills: 0 | Status: SHIPPED | OUTPATIENT
Start: 2024-07-19 | End: 2024-07-29

## 2024-07-19 NOTE — PROGRESS NOTES
Assessment & Plan     Right ear pain  BID x 10 days. Ok to use when breastfeeding.   - amoxicillin (AMOXIL) 500 MG capsule  Dispense: 20 capsule; Refill: 0     No LOS data to display   Time spent by me doing chart review, history and exam, documentation and further activities per the note        No follow-ups on file.    Jaqueline Thrasher MD  [facility] ALAYNA Palacios is a 33 year old female who presents to clinic today for the following health issues:  Chief Complaint   Patient presents with    Ear Problem     Aching Rt ear x Tuesday. No drainage. No change of hearing, pt states painful like jaw pain.           No data to display              HPI      URI Adult    Onset of symptoms was 4 day(s) ago.  Course of illness is worsening.    Severity moderate  Current and Associated symptoms: ear pain right  Treatment measures tried include Tylenol/Ibuprofen.  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   She is lactating.     Review of Systems  Constitutional, HEENT, cardiovascular, pulmonary, gi and gu systems are negative, except as otherwise noted.      Objective    /86   Pulse 76   Temp 97.6  F (36.4  C) (Tympanic)   Resp 20   LMP 07/03/2024 (Within Days)   SpO2 97%   Breastfeeding Yes   Physical Exam   GENERAL: alert and no distress  HENT: normal cephalic/atraumatic, right ear: erythematous and bulging membrane, nose and mouth without ulcers or lesions, oropharynx clear, and oral mucous membranes moist  RESP: lungs clear to auscultation - no rales, rhonchi or wheezes  CV: regular rate and rhythm, normal S1 S2, no S3 or S4, no murmur, click or rub, no peripheral edema  ABDOMEN: soft, nontender, no hepatosplenomegaly, no masses and bowel sounds normal  MS: no gross musculoskeletal defects noted, no edema    No results found for this or any previous visit (from the past 24 hour(s)).

## 2024-07-31 ENCOUNTER — VIRTUAL VISIT (OUTPATIENT)
Dept: BEHAVIORAL HEALTH | Facility: CLINIC | Age: 34
End: 2024-07-31
Payer: COMMERCIAL

## 2024-07-31 DIAGNOSIS — F43.21 ADJUSTMENT DISORDER WITH DEPRESSED MOOD: Primary | ICD-10-CM

## 2024-07-31 PROCEDURE — 90834 PSYTX W PT 45 MINUTES: CPT | Mod: 95 | Performed by: COUNSELOR

## 2024-07-31 NOTE — PROGRESS NOTES
[facility] [place] [facility] Behavioral Health  July 31, 2024      Behavioral Health Clinician Progress Note    Patient Name: Philip Pfeiffer           Service Type:  Individual      Service Location:   MyChart / Email (patient reached)     Session Start Time: 2:01pm Session End Time: 2:50pm      Session Length: 38 - 52      Attendees: Patient     Service Modality:  Video Visit:      Provider verified identity through the following two step process.  Patient provided:  Patient is known previously to provider    Telemedicine Visit: The patient's condition can be safely assessed and treated via synchronous audio and visual telemedicine encounter.      Reason for Telemedicine Visit: Patient has requested telehealth visit    Originating Site (Patient Location): Patient's home    Distant Site (Provider Location): [facility]    Consent:  The patient/guardian has verbally consented to: the potential risks and benefits of telemedicine (video visit) versus in person care; bill my insurance or make self-payment for services provided; and responsibility for payment of non-covered services.     Patient would like the video invitation sent by:  My Chart    Mode of Communication:  Video Conference via [facility]    Distant Location (Provider):  On-site    As the provider I attest to compliance with applicable laws and regulations related to telemedicine.    Visit Activities (Refresh list every visit): [facility] Only    Diagnostic Assessment Date: 6/17/2024  Treatment Plan Date: July 8, 2024    Assessments:  The following assessments were completed by patient for this visit:    Previous PHQ-9:       5/28/2024     2:22 PM 6/14/2024    10:04 AM 6/25/2024     2:01 PM   PHQ-9 SCORE   PHQ-9 Total Score [place]nor 9 (Mild depression) 4 (Minimal depression) 2 (Minimal depression)   PHQ-9 Total Score 9 4 2     Previous ISAEL-7:       5/3/2023     9:33 AM 5/23/2024    12:28 PM 6/25/2024     2:02 PM    ISAEL-7 SCORE   Total Score  12 (moderate anxiety) 7 (mild anxiety)   Total Score 0 12 7       DATA  Extended Session (60+ minutes): No  Interactive Complexity: No  Crisis: No  [facility] Patient: No    Treatment Objective(s) Addressed in This Session:  Target Behavior(s):  symptom management    Adjustment Difficulties: will develop coping/problem-solving skills to facilitate more adaptive adjustment    Current Stressors / Issues:  Pt shared that her family just got back from vacation in [place], [place] with the entire extended family. It was fun to get everyone together and the cousin all enjoyed playing together.     Before vacation she and her  were having more difficulty connecting and emotions were high. Pt reflected on their relationship and how it's changed over the years. Processed communication in her relationship and strategies to share her perspective more with her  to assist in his understanding. Also discussed strategies to communicate her needs to relieve her executive functioning after long days of having an abundance of decision making.    Informed of [facility]'s departure from clinic. Details shared of how to connect with another therapist within [facility] or how to use [facility] for support in connecting with therapist agency outside of [facility]. Pt stated understanding.     Progress on Treatment Objective(s) / Homework:  Satisfactory progress - ACTION (Actively working towards change); Intervened by reinforcing change plan / affirming steps taken    Motivational Interviewing    MI Intervention: Expressed Empathy/Understanding, Supported Autonomy, Collaboration, Evocation, Permission to raise concern or advise, Open-ended questions, and Reflections: simple and complex     Change Talk Expressed by the Patient: Ability to change Reasons to change Need to change    Provider Response to Change Talk: E - Evoked more info from patient about behavior change, A - Affirmed patient's thoughts, decisions, or  attempts at behavior change, R - Reflected patient's change talk, and S - Summarized patient's change talk statements  CBT:  Discussed common cognitive distortions identified them in patient's life, Explored ways to challenge, replace, and act against these cognitions  SKILLS TRAINING: Explored skills useful to client in current situation Skills include assertiveness, communication, conflict management, problem-solving, relaxation, etc.  SOLUTION FOCUSED: Explored patterns in patient's relationships and discussed options for new behaviors, Explored patterns in patient's actions and choices and discussed options for new behaviors  BEHAVIORAL ACTIVATION:  Discussed steps patient can take to become more involved in meaningful activity, Identified barriers to these activities and explored possible solutions    Assessments completed prior to visit:  The following assessments were completed by patient for this visit:  PROMIS 10-Global Health (only subscores and total score):       4/28/2022     2:30 PM 6/14/2024    10:17 AM   PROMIS-10 Scores Only   Global Mental Health Score 14 14   Global Physical Health Score 17 15   PROMIS TOTAL - SUBSCORES 31 29       Care Plan review completed: Yes    Medication Review:  No changes to current psychiatric medication(s)    Medication Compliance:  Yes    Changes in Health Issues:   None reported    Chemical Use Review:   Substance Use: Chemical use reviewed, no active concerns identified      Tobacco Use: No current tobacco use.      Assessment: Current Emotional / Mental Status (status of significant symptoms):  Risk status (Self / Other harm or suicidal ideation)  Patient denies a history of suicidal ideation, suicide attempts, self-injurious behavior, homicidal ideation, homicidal behavior, and and other safety concerns  Patient denies current fears or concerns for personal safety.  Patient denies current or recent suicidal ideation or behaviors.  Patient denies current or recent  homicidal ideation or behaviors.  Patient denies current or recent self injurious behavior or ideation.  Patient denies other safety concerns.  A safety and risk management plan has not been developed at this time, however patient was encouraged to call [place] [phone] should there be a change in any of these risk factors.      Appearance:   Appropriate   Eye Contact:   Good   Psychomotor Behavior: Normal   Attitude:   Cooperative   Orientation:   All  Speech   Rate / Production: Normal    Volume:  Normal   Mood:    Normal  Affect:    Appropriate   Thought Content:  Clear   Thought Form:  Coherent  Logical   Insight:    Good     Diagnoses:  1. Adjustment disorder with depressed mood        Collateral Reports Completed:  Not Applicable    Plan: (Homework, other):  Patient was given information about behavioral services and encouraged to schedule a follow up appointment with [facility]. [facility] provided information about mental health symptoms and treatment options , deep Breathing Strategies to practice when experiencing depression, and information on mindfulness and exercises to practice.  She was also given CD Recommendations: No indications of CD issues.      ZENON Villeda, [facility]  Behavioral Health Clinician  [facility]      ______________________________________________________________________    Integrated Primary Care Behavioral Health Treatment Plan    Patient's Name: Philip Pfeiffer  YOB: 1990    Date of Creation: July 8, 2024  Date Treatment Plan Last Reviewed/Revised: July 31, 2024     DSM5 Diagnoses: Adjustment Disorders  309.0 (F43.21) With depressed mood  Psychosocial / Contextual Factors: Pt is currently  with 2 children. 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   PROMIS (reviewed every 90 days):   PROMIS 10-Global Health (only subscores and total score):       4/28/2022     2:30 PM 6/14/2024    10:17 AM   PROMIS-10 Scores Only   Global Mental  Health Score 14 14   Global Physical Health Score 17 15   PROMIS TOTAL - SUBSCORES 31 29         Referral / Collaboration:  Referral to another professional/service is not indicated at this time..    Anticipated number of session for this episode of care: 3-6  Anticipation frequency of session: Every other week  Anticipated Duration of each session: 38-52 minutes  Treatment plan will be reviewed in 90 days or when goals have been changed.       MeasurableTreatment Goal(s) related to diagnosis / functional impairment(s)  Goal:  Philip will reduce symptoms of depression and suicidal thinking and increase life functioning; effectively reduce depressive symptoms as evidenced by a reduced PHQ9 score of 5 or less with occurrence of several days or less.    Objective #A:  Philip will experience a reduction in depressed mood, will develop more effective coping skills to manage depressive symptoms and will develop healthy cognitive patterns and beliefs   Client will increase interest, engagement, and pleasure in doing things  Decrease frequency and intensity of feeling down, depressed, hopeless  Identify negative self-talk and behaviors: challenge core beliefs, myths, and actions  Status: Continued - Date(s): July 8, 2024    Objective #B: Philip will increase ability to function adaptively and will continue to take medications as prescribed / participate in supportive activities and services   Client will Increase interest, engagement, and pleasure in doing things  Improve quantity and quality of night time sleep / decrease daytime naps  Feel less tired and more energy during the day    Improve diet, appetite, mindful eating, and / or meal planning  Identify negative self-talk and behaviors: challenge core beliefs, myths, and actions  Improve concentration, focus, and mindfulness in daily activities .  Status: Continued - Date(s): July 8, 2024      Intervention(s)  Psycho-education regarding mental health diagnoses and treatment  options    Skills training  Explore skills useful to client in current situation  Skills include assertiveness, communication, conflict management, problem-solving, relaxation, etc.    Solution-Focused Therapy  Explore patterns in patient's relationships and discussed options for new behaviors  Explore patterns in patient's actions and choices and discussed options for new behaviors    Cognitive-behavioral Therapy  Discuss common cognitive distortions, identified them in patient's life  Explore ways to challenge, replace, and act against these cognitions    Acceptance and Commitment Therapy  Explore and identified important values in patient's life  Discuss ways to commit to behavioral activation around these values    Psychodynamic psychotherapy  Discuss patient's emotional dynamics and issues and how they impact behaviors  Explore patient's history of relationships and how they impact present behaviors  Explore how to work with and make changes in these schemas and patterns    Behavioral Activation  Discuss steps patient can take to become more involved in meaningful activity  Identify barriers to these activities and explored possible solutions    Mindfulness-Based Strategies  Discuss skills based on development and application of mindfulness  Skills drawn from dialectical behavior therapy, mindfulness-based stress reduction, mindfulness-based cognitive therapy, etc.        Patient has reviewed and agreed to the above plan.      Any Parker, PIETER  July 31, 2024

## 2024-08-27 ASSESSMENT — ANXIETY QUESTIONNAIRES
7. FEELING AFRAID AS IF SOMETHING AWFUL MIGHT HAPPEN: NEARLY EVERY DAY
GAD7 TOTAL SCORE: 14
GAD7 TOTAL SCORE: 14
8. IF YOU CHECKED OFF ANY PROBLEMS, HOW DIFFICULT HAVE THESE MADE IT FOR YOU TO DO YOUR WORK, TAKE CARE OF THINGS AT HOME, OR GET ALONG WITH OTHER PEOPLE?: NOT DIFFICULT AT ALL
GAD7 TOTAL SCORE: 14

## 2024-08-27 ASSESSMENT — PATIENT HEALTH QUESTIONNAIRE - PHQ9
SUM OF ALL RESPONSES TO PHQ QUESTIONS 1-9: 7
10. IF YOU CHECKED OFF ANY PROBLEMS, HOW DIFFICULT HAVE THESE PROBLEMS MADE IT FOR YOU TO DO YOUR WORK, TAKE CARE OF THINGS AT HOME, OR GET ALONG WITH OTHER PEOPLE: NOT DIFFICULT AT ALL
SUM OF ALL RESPONSES TO PHQ QUESTIONS 1-9: 7

## 2024-08-28 ENCOUNTER — VIRTUAL VISIT (OUTPATIENT)
Dept: PSYCHOLOGY | Facility: CLINIC | Age: 34
End: 2024-08-28
Payer: COMMERCIAL

## 2024-08-28 DIAGNOSIS — F41.1 GENERALIZED ANXIETY DISORDER: ICD-10-CM

## 2024-08-28 DIAGNOSIS — F33.1 MAJOR DEPRESSIVE DISORDER, RECURRENT, MODERATE (H): Primary | ICD-10-CM

## 2024-08-28 PROCEDURE — 90791 PSYCH DIAGNOSTIC EVALUATION: CPT | Mod: 95 | Performed by: SOCIAL WORKER

## 2024-08-28 NOTE — PROGRESS NOTES
"[facility] Counseling       PATIENT'S NAME: Philip Pfeiffer  PREFERRED NAME: Philip  PRONOUNS:   MRN: [id number]  : 1990  ADDRESS: [address] [place] [id number]  [facility]  [id number]  DATE OF SERVICE: 24  START TIME: 2:01PM  END TIME: 2:48pm  PREFERRED PHONE: [phone]  May we leave a program related message: Yes  EMERGENCY CONTACT: was obtained .  SERVICE MODALITY:  Video Visit:      Provider verified identity through the following two step process.  Patient provided:  Patient  and Patient was verified at admission/transfer    Telemedicine Visit: The patient's condition can be safely assessed and treated via synchronous audio and visual telemedicine encounter.      Reason for Telemedicine Visit: Patient has requested telehealth visit    Originating Site (Patient Location): Patient's home    Distant Site (Provider Location): Provider Remote Setting- Home Office    Consent:  The patient/guardian has verbally consented to: the potential risks and benefits of telemedicine (video visit) versus in person care; bill my insurance or make self-payment for services provided; and responsibility for payment of non-covered services.     Patient would like the video invitation sent by:  Send to e-mail at: [email or web address]    Mode of Communication:  Video Conference via [employer]well    Distant Location (Provider):  Off-site    As the provider I attest to compliance with applicable laws and regulations related to telemedicine.    UNIVERSAL ADULT Mental Health DIAGNOSTIC ASSESSMENT    Identifying Information:  Patient is a 33 year old,   individual. Patient was referred for an assessment by self.  Patient attended the session alone.    Chief Complaint:   The reason for seeking services at this time is: \"Postpartum Depression and anxiety\".  The problem(s) began 24.    Patient has attempted to resolve these concerns in the past through previous therapy and medication management " of services .    Social/Family History:  Patient reported that she grew up in [place], [place].  She was raised by her biological parents. Parents were always together.  Patient reported that her childhood was good overall.  Patient described their current relationships with family of origin as positive and supportive.    The patient describes her cultural background as .  Cultural influences and impact on patient's life structure, values, norms, and healthcare: White, small town Oriental orthodox, [place] family.  Contextual influences on patient's health include: Contextual Factors: Individual Factors Patient reports experiencing self-critical thought patterns, as well as challenges with motivation related to parenting and relationships with others, noting that these symptoms had an onset following the birth of her youngest child.  These factors will be addressed in the Preliminary Treatment plan. Patient identified her preferred language to be English. Patient reported that she does not need the assistance of an  or other support involved in therapy.     Patient reported that she had no significant delays in developmental tasks.  Patient's highest education level was college graduate. Patient identified the following learning problems: none reported.  Modifications will not be used to assist communication in therapy.  Patient reports that she is able to understand written materials.    Patient's current relationship status is  for 7 years. Patient identified her sexual orientation as heterosexual.  Patient reported having 2 child(cata). Patient identified parents; siblings; spouse as part of her support system. Patient identified the quality of these relationships as stable and meaningful.      Patient's current living/housing situation involves staying in own home/apartment.  The immediate members of family and household include Zak Butler,  and patient reports that housing is  stable.    Patient is currently unemployed. Patient reports that her  finances are obtained through spouse. Patient does identify finances as a current stressor.      Patient reported that she has not been involved with the legal system. Patient does not report being under probation/ parole/ jurisdiction.    Patient's Strengths and Limitations:  Patient identified the following strengths or resources that will help them succeed in treatment: commitment to health and well being, friends / good social support, family support, insight, intelligence, and motivation. Things that may interfere with the patient's success in treatment include: none identified.     Assessments:  The following assessments were completed by patient for this visit:  PHQ9:       8/23/2023    10:59 AM 1/23/2024    10:31 AM 5/23/2024    12:31 PM 5/28/2024     2:22 PM 6/14/2024    10:04 AM 6/25/2024     2:01 PM 8/27/2024     4:13 PM   PHQ-9 SCORE   PHQ-9 Total Score MyChart   10 (Moderate depression) 9 (Mild depression) 4 (Minimal depression) 2 (Minimal depression) 7 (Mild depression)   PHQ-9 Total Score 1 0 10 9 4 2 7     GAD7:       4/28/2022     2:29 PM 5/3/2023     9:33 AM 5/23/2024    12:28 PM 6/25/2024     2:02 PM 8/27/2024     4:13 PM   ISAEL-7 SCORE   Total Score 3 (minimal anxiety)  12 (moderate anxiety) 7 (mild anxiety) 14 (moderate anxiety)   Total Score 3 0 12 7 14     CAGE-AID:       4/25/2022     2:08 PM   CAGE-AID Total Score   Total Score 0   Total Score MyChart 0 (A total score of 2 or greater is considered clinically significant)     PROMIS 10-Global Health (all questions and answers displayed):       4/28/2022     2:30 PM 6/14/2024    10:17 AM 8/27/2024     4:14 PM   PROMIS 10   In general, would you say your health is: Good Good Good   In general, would you say your quality of life is: Very good Very good Good   In general, how would you rate your physical health? Good Fair Fair   In general, how would you rate your mental health,  including your mood and your ability to think? Good Good Good   In general, how would you rate your satisfaction with your social activities and relationships? Good Very good Fair   In general, please rate how well you carry out your usual social activities and roles Good Good Very good   To what extent are you able to carry out your everyday physical activities such as walking, climbing stairs, carrying groceries, or moving a chair? Completely Completely Completely   In the past 7 days, how often have you been bothered by emotional problems such as feeling anxious, depressed, or irritable? Rarely Sometimes Sometimes   In the past 7 days, how would you rate your fatigue on average? Mild Moderate Mild   In the past 7 days, how would you rate your pain on average, where 0 means no pain, and 10 means worst imaginable pain? 0 0 0   In general, would you say your health is: 3 3 3   In general, would you say your quality of life is: 4 4 3   In general, how would you rate your physical health? 3 2 2   In general, how would you rate your mental health, including your mood and your ability to think? 3 3 3   In general, how would you rate your satisfaction with your social activities and relationships? 3 4 2   In general, please rate how well you carry out your usual social activities and roles. (This includes activities at home, at work and in your community, and responsibilities as a parent, child, spouse, employee, friend, etc.) 3 3 4   To what extent are you able to carry out your everyday physical activities such as walking, climbing stairs, carrying groceries, or moving a chair? 5 5 5   In the past 7 days, how often have you been bothered by emotional problems such as feeling anxious, depressed, or irritable? 2 3 3   In the past 7 days, how would you rate your fatigue on average? 2 3 2   In the past 7 days, how would you rate your pain on average, where 0 means no pain, and 10 means worst imaginable pain? 0 0 0   Global  Mental Health Score 14 14 11   Global Physical Health Score 17 15 16   PROMIS TOTAL - SUBSCORES 31 29 27     PROMIS 10-Global Health (only subscores and total score):       4/28/2022     2:30 PM 6/14/2024    10:17 AM 8/27/2024     4:14 PM   PROMIS-10 Scores Only   Global Mental Health Score 14 14 11   Global Physical Health Score 17 15 16   PROMIS TOTAL - SUBSCORES 31 29 27     [place] Suicide Severity Rating Scale (Lifetime/Recent)      8/10/2021     2:00 AM 4/27/2022     1:13 PM 6/17/2024    10:00 AM   [place] Suicide Severity Rating (Lifetime/Recent)   Q1 Wished to be Dead (Past Month) no     Q2 Suicidal Thoughts (Past Month) no     Q1 Wish to be Dead (Lifetime)  N N   Q2 Non-Specific Active Suicidal Thoughts (Lifetime)  N N   Actual Attempt (Lifetime)  N N   Has subject engaged in non-suicidal self-injurious behavior? (Lifetime)  N N   Interrupted Attempts (Lifetime)  N N   Aborted or Self-Interrupted Attempt (Lifetime)  N N   Preparatory Acts or Behavior (Lifetime)  N N   Calculated C-SSRS Risk Score (Lifetime/Recent)  No Risk Indicated No Risk Indicated     [place] Suicide Severity Rating Scale (Short Version)      8/9/2021     7:00 PM 8/10/2021     2:00 AM 12/11/2023     9:36 AM   [place] Suicide Severity Rating (Short Version)   Over the past 2 weeks have you felt down, depressed, or hopeless? no no no   Over the past 2 weeks have you had thoughts of killing yourself? no no no   Have you ever attempted to kill yourself? no no no   Q1 Wished to be Dead (Past Month)  no    Q2 Suicidal Thoughts (Past Month)  no        Personal and Family Medical History:  Patient does not report a family history of mental health concerns. Patient reports family history includes Diabetes Type 2  in her mother; Obesity in her mother; Sjogren's in her maternal grandmother and mother.    Patient does report Mental Health Diagnosis and/or Treatment.  Patient reported the following previous diagnoses which include(s): depression  and anxiety.  Patient reported that her symptoms began within the past couple of years. Patient has received mental health services in the past: previous therapy.  Psychiatric Hospitalizations: none.  Patient denies a history of civil commitment.  Currently, patient is not receiving other mental health services.       Patient has had a physical exam to rule out medical causes for current symptoms.  Date of last physical exam was within the past year. Client was encouraged to follow up with PCP if symptoms were to develop. The patient has a [facility] Provider, who is named Queenie Zurita. Patient reports no current medical concerns.  Patient denies any issues with pain. There are not significant appetite / nutritional concerns / weight changes. Patient does not report a history of head injury / trauma / cognitive impairment.        Current Outpatient Medications   Medication Sig Dispense Refill    Prenatal Vit-Fe Fumarate-FA (PRENATAL VITAMIN PO)       sertraline (ZOLOFT) 50 MG tablet Take 1 tablet (50 mg) by mouth daily 90 tablet 3     No current facility-administered medications for this visit.        Medication Adherence:  Patient reports   taking prescribed medications as prescribed.    Patient Allergies:    Allergies   Allergen Reactions    Vancomycin Hives and Itching       Medical History:    Past Medical History:   Diagnosis Date    Varicose veins of lower extremity          Current Mental Status Exam:   Appearance:  Appropriate    Eye Contact:  Good   Psychomotor:  Normal       Gait / station:  no problem  Attitude / Demeanor: Cooperative  Interested Attentive  Speech      Rate / Production: Normal/ Responsive      Volume:  Normal  volume      Language:  intact, no problems, and good  Mood:   Anxious  Depressed   Affect:   Appropriate    Thought Content: Clear   Thought Process: Coherent  Logical       Associations: No loosening of associations  Insight:   Good   Judgment:  Intact    Orientation:  All  Attention/concentration: Good    Substance Use:   Patient did not report a family history of substance use concerns; see medical history section for details.  Patient has not received chemical dependency treatment in the past.  Patient has not ever been to detox.      Patient is not currently receiving any chemical dependency treatment.           Substance History of use Age of first use Date of last use     Pattern and duration of use (include amounts and frequency)   Alcohol currently use   18 05/11/24 Reports occasional use   Cannabis   never used   REPORTS SUBSTANCE USE: N/A     Amphetamines   never used   REPORTS SUBSTANCE USE: N/A   Cocaine/crack    never used   REPORTS SUBSTANCE USE: N/A   Hallucinogens never used     REPORTS SUBSTANCE USE: N/A   Inhalants never used     REPORTS SUBSTANCE USE: N/A   Heroin never used     REPORTS SUBSTANCE USE: N/A   Other Opiates never used   REPORTS SUBSTANCE USE: N/A   Benzodiazepine   never used   REPORTS SUBSTANCE USE: N/A   Barbiturates never used   REPORTS SUBSTANCE USE: N/A   Over the counter meds never used   REPORTS SUBSTANCE USE: N/A   Caffeine currently use 15  Reports occasional use   Nicotine  never used   REPORTS SUBSTANCE USE: N/A   Other substances not listed above:  Identify:  never used   REPORTS SUBSTANCE USE: N/A     Patient reported the following problems as a result of their substance use: no problems, not applicable.    Substance Use: No symptoms    Based on the negative CAGE score and clinical interview there  are not indications of drug or alcohol abuse.    Significant Losses / Trauma / Abuse / Neglect Issues:   Patient did not  serve in the .  There are no indications or report of significant loss, trauma, abuse or neglect issues.  Concerns for possible neglect are not present.     Safety Assessment:   Patient denies current homicidal ideation and behaviors.  Patient reports current self-injurious ideation.  Onset: within  past couple of years, frequency: varies/currently not frequent, duration: fleeting, intensity: mild to moderate.  Client reports they are not currently engaging in self-injurious behaivor..She reports most recently having experienced fleeting thoughts of not wanting to be in her current circumstances, but not wanting to actually die.   Patient denied risk behaviors associated with substance use.   Patient denies any high risk behaviors associated with mental health symptoms.  Patient reports the following current concerns for their personal safety: None.  Patient reports there are not firearms in the house.      History of Safety Concerns:  Patient denied a history of homicidal ideation.     Patient denied a history of personal safety concerns.    Patient denied a history of assaultive behaviors.    Patient denied a history of sexual assault behaviors.     Patient denied a history of risk behaviors associated with substance use.  Patient denies any history of high risk behaviors associated with mental health symptoms.  Patient reports the following protective factors: dedication to family or friends; safe and stable environment; purpose; secure attachment; adherence with prescribed medication; living with other people; daily obligations; structured day; effective problem solving skills; commitment to well being; sense of meaning; sense of personal control or determination    Risk Plan:  See Recommendations for Safety and Risk Management Plan    Review of Symptoms per patient report:   Depression: Change in sleep, Lack of interest, Excessive or inappropriate guilt, Change in energy level, Difficulties concentrating, Change in appetite, Psychomotor slowing or agitation, Suicidal ideation, Feelings of hopelessness, Feelings of helplessness, Low self-worth, Ruminations, Irritability, Feeling sad, down, or depressed, and Withdrawn  Britt:  No Symptoms  Psychosis: No Symptoms  Anxiety: Excessive worry, Nervousness,  Physical complaints, such as headaches, stomachaches, muscle tension, Social anxiety, Sleep disturbance, Ruminations, Poor concentration, and Irritability  Panic:  No symptoms  Post Traumatic Stress Disorder:  No Symptoms   Eating Disorder: No Symptoms  ADD / ADHD:  No symptoms  Conduct Disorder: No symptoms  Autism Spectrum Disorder: No symptoms  Obsessive Compulsive Disorder: No Symptoms    Patient reports the following compulsive behaviors and treatment history:  none reported .      Diagnostic Criteria:   Generalized Anxiety Disorder  A. Excessive anxiety and worry about a number of events or activities (such as work or school performance).   B. The person finds it difficult to control the worry.  C. Select 3 or more symptoms (required for diagnosis). Only one item is required in children.   - Restlessness or feeling keyed up or on edge.    - Being easily fatigued.    - Difficulty concentrating or mind going blank.    - Irritability.    - Muscle tension.    - Sleep disturbance (difficulty falling or staying asleep, or restless unsatisfying sleep).   D. The focus of the anxiety and worry is not confined to features of an Axis I disorder.  E. The anxiety, worry, or physical symptoms cause clinically significant distress or impairment in social, occupational, or other important areas of functioning.   F. The disturbance is not due to the direct physiological effects of a substance (e.g., a drug of abuse, a medication) or a general medical condition (e.g., hyperthyroidism) and does not occur exclusively during a Mood Disorder, a Psychotic Disorder, or a Pervasive Developmental Disorder.    - The aformentioned symptoms began within the past couple of years, occurs most days per week, and is experienced as moderate to severe.    Major Depressive Disorder  A) Recurrent episode(s) - symptoms have been present during the same 2-week period and represent a change from previous functioning 5 or more symptoms (required for  diagnosis)   - Depressed mood. Note: In children and adolescents, can be irritable mood.     - Diminished interest or pleasure in all, or almost all, activities.    - Significant appetite changes    - Decreased sleep.    - Psychomotor activity    - Fatigue or loss of energy.    - Feelings of worthlessness or inappropriate and excessive guilt.    - Diminished ability to think or concentrate, or indecisiveness.    - Recurrent thoughts of death (not just fear of dying), recurrent suicidal ideation without a specific plan, or a suicide attempt or a specific plan for committing suicide.   B) The symptoms cause clinically significant distress or impairment in social, occupational, or other important areas of functioning  C) The episode is not attributable to the physiological effects of a substance or to another medical condition  D) The occurence of major depressive episode is not better explained by other thought / psychotic disorders  E) There has never been a manic episode or hypomanic episode    Functional Status:  Patient reports the following functional impairments:  childcare / parenting, management of the household and or completion of tasks, organization, relationship(s), self-care, social interactions, and work / vocational responsibilities.     Nonprogrammatic care:  Patient is requesting basic services to address current mental health concerns.    Clinical Summary:  1. Psychosocial, Cultural and Contextual Factors: Patient reports experiencing self-critical thought patterns, as well as challenges with motivation related to parenting and relationships with others, noting that these symptoms had an onset following the birth of her youngest child.  T  2. Principal DSM5 Diagnoses  (Sustained by DSM5 Criteria Listed Above):   296.32 (F33.1) Major Depressive Disorder, Recurrent Episode, Moderate _  300.02 (F41.1) Generalized Anxiety Disorder.  3. Other Diagnoses that is relevant to services:  N/A  4. Provisional  Diagnosis:  N/A  5. Prognosis: Return to Baseline Functioning, Expect Improvement, Relieve Acute Symptoms, and Maintain Current Status / Prevent Deterioration.  6. Likely consequences of symptoms if not treated: If the reported symptoms and concerns are not treated, it would be likely that they would persist, potentially increasing in frequency and severity, and further impacting patient's mental health, functioning, and general sense of well-being.   7. Client strengths include:  caring, educated, empathetic, goal-focused, has a previous history of therapy, insightful, intelligent, motivated, open to learning, open to suggestions / feedback, responsible parent, and support of family, friends and providers .     Recommendations:     1. Plan for Safety and Risk Management:   Safety and Risk: A safety and risk management plan has been developed including: Patient consented to co-developed safety plan on 8/28/2024.  Safety and risk management plan was reviewed.   Patient agreed to use safety plan should any safety concerns arise.  A copy was made available to the patient..          Report to child / adult protection services was NA.     2. Patient's identified mental health concerns with a cultural influence will be addressed by having a conversation with patient at the start of services in regard to how to best address any potential concerns that might come up in therapy, in a manner that feels both respectful and culturally appropriate for patient .     3. Initial Treatment will focus on:    Depressed Mood - decreasing frequency and severity of symptoms  Anxiety - decreasing frequency and severity of symptoms .     4. Resources/Service Plan:    services are not indicated.   Modifications to assist communication are not indicated.   Additional disability accommodations are not indicated.      5. Collaboration:   Collaboration / coordination of treatment will be initiated with the following  support  professionals:  none indicated at the time of this assessment, based upon the information provided/reported .      6.  Referrals:   The following referral(s) will be initiated:  none at this time, based upon the information reported at the time of this assessment .       A Release of Information has been obtained for the following:  N/A .     Clinical Substantiation/medical necessity for the above recommendations:  It would be recommended that patient access individual therapy services, as well as continued psychiatry/medication management of symptoms, and these symptoms are deemed medically necessary at this time, to assist patient in effectively addressing the reported symptoms and presenting concerns, as well as helping to improve her mental health, functioning and general sense of well-being.     7. JUNIOR:    JUNIOR:  Discussed the general effects of drugs and alcohol on health and well-being. Provider gave patient printed information about the  effects of chemical use on their health and well being. Recommendations:  none, based upon the information reported at the time of this assessment.     8. Records:   These were reviewed at time of assessment.   Information in this assessment was obtained from the medical record and  provided by patient who is a good historian.  Patient will have open access to their mental health medical record.    9.   Interactive Complexity: No    10. Safety Plan:   [facility] Safety Plan      Creation Date: 8/28/24       Step 1: Warning signs:    Warning Signs    feeling overwhelmed    feeling depressed    thoughts of not wanting to be in my current situation, but also not wanting to actually die      Step 2: Internal coping strategies - Things I can do to take my mind off my problems without contacting another person:    Strategies    going for a walk    taking a moment for myself    reminding myself that I am doing the best that I am able      Step 3: People and social settings that  provide distraction:    Name Contact Information    my  contact information in phone and reside together    my parents contact information in phone    good friends contact information in phone       Places    home with my  and children      Step 4: People whom I can ask for help during a crisis:    Name Contact Information    my  contact information in phoen and reside together    my parents contact information in phone    good friends contact information in phone      Step 5: Professionals or agencies I can contact during a crisis:    Clinician/Agency Name Phone Emergency Contact    [facility] [phone]     [place] Warm Line [phone]       [facility] Emergency Department Emergency Department Address Emergency Department Phone    [facility] Emergency Department [address] [facility] [phone]      Suicide Prevention Lifeline Phone: Call or Text 783  Crisis Text Line: Text HOME to 456421     Step 6: Making the environment safer (plan for lethal means safety):   Did not identify any lethal methods     Optional: What is most important to me and worth living for?:   My , my children, my parents and siblings, good friends     [place] Safety Plan. Pauline Pickens and Sabas Gomez. Used with permission of the authors.        Provider Name/ Credentials:  ZENON Wyatt, [facility]  August 28, 2024

## 2024-09-13 ENCOUNTER — E-VISIT (OUTPATIENT)
Dept: OBGYN | Facility: CLINIC | Age: 34
End: 2024-09-13
Payer: COMMERCIAL

## 2024-09-13 DIAGNOSIS — F32.1 CURRENT MODERATE EPISODE OF MAJOR DEPRESSIVE DISORDER, UNSPECIFIED WHETHER RECURRENT (H): Primary | ICD-10-CM

## 2024-09-13 PROCEDURE — 99421 OL DIG E/M SVC 5-10 MIN: CPT | Performed by: OBSTETRICS & GYNECOLOGY

## 2024-09-13 ASSESSMENT — ANXIETY QUESTIONNAIRES
7. FEELING AFRAID AS IF SOMETHING AWFUL MIGHT HAPPEN: NEARLY EVERY DAY
GAD7 TOTAL SCORE: 17
8. IF YOU CHECKED OFF ANY PROBLEMS, HOW DIFFICULT HAVE THESE MADE IT FOR YOU TO DO YOUR WORK, TAKE CARE OF THINGS AT HOME, OR GET ALONG WITH OTHER PEOPLE?: SOMEWHAT DIFFICULT

## 2024-09-13 ASSESSMENT — PATIENT HEALTH QUESTIONNAIRE - PHQ9
10. IF YOU CHECKED OFF ANY PROBLEMS, HOW DIFFICULT HAVE THESE PROBLEMS MADE IT FOR YOU TO DO YOUR WORK, TAKE CARE OF THINGS AT HOME, OR GET ALONG WITH OTHER PEOPLE: NOT DIFFICULT AT ALL
SUM OF ALL RESPONSES TO PHQ QUESTIONS 1-9: 8
SUM OF ALL RESPONSES TO PHQ QUESTIONS 1-9: 8

## 2024-09-14 RX ORDER — SERTRALINE HYDROCHLORIDE 100 MG/1
100 TABLET, FILM COATED ORAL DAILY
Qty: 90 TABLET | Refills: 1 | Status: SHIPPED | OUTPATIENT
Start: 2024-09-14 | End: 2025-03-13

## 2024-09-14 ASSESSMENT — PATIENT HEALTH QUESTIONNAIRE - PHQ9: SUM OF ALL RESPONSES TO PHQ QUESTIONS 1-9: 8

## 2024-10-07 ENCOUNTER — ALLIED HEALTH/NURSE VISIT (OUTPATIENT)
Dept: FAMILY MEDICINE | Facility: CLINIC | Age: 34
End: 2024-10-07
Payer: COMMERCIAL

## 2024-10-07 VITALS — TEMPERATURE: 97.5 F

## 2024-10-07 DIAGNOSIS — Z23 ENCOUNTER FOR IMMUNIZATION: Primary | ICD-10-CM

## 2024-10-07 PROCEDURE — 91320 SARSCV2 VAC 30MCG TRS-SUC IM: CPT

## 2024-10-07 PROCEDURE — 99207 PR NO CHARGE NURSE ONLY: CPT

## 2024-10-07 PROCEDURE — 90471 IMMUNIZATION ADMIN: CPT

## 2024-10-07 PROCEDURE — 90656 IIV3 VACC NO PRSV 0.5 ML IM: CPT

## 2024-10-07 PROCEDURE — 90480 ADMN SARSCOV2 VAC 1/ONLY CMP: CPT

## 2024-10-07 NOTE — PROGRESS NOTES
Prior to immunization administration, verified patients identity using patient s name and date of birth. Please see Immunization Activity for additional information.     Is the patient's temperature normal (100.5 or less)? Yes     Patient MEETS CRITERIA. PROCEED with vaccine administration.      Patient instructed to remain in clinic for 15 minutes afterwards, and to report any adverse reactions.      Link to Ancillary Visit Immunization Standing Orders SmartSet     Screening performed by Ronit Salvador CMA on 10/7/2024 at 9:32 AM.

## 2025-03-09 ENCOUNTER — HEALTH MAINTENANCE LETTER (OUTPATIENT)
Age: 35
End: 2025-03-09

## 2025-04-08 ENCOUNTER — MYC REFILL (OUTPATIENT)
Dept: OBGYN | Facility: CLINIC | Age: 35
End: 2025-04-08
Payer: COMMERCIAL

## 2025-04-08 DIAGNOSIS — F32.1 CURRENT MODERATE EPISODE OF MAJOR DEPRESSIVE DISORDER, UNSPECIFIED WHETHER RECURRENT (H): ICD-10-CM

## 2025-04-09 RX ORDER — SERTRALINE HYDROCHLORIDE 100 MG/1
100 TABLET, FILM COATED ORAL DAILY
Qty: 60 TABLET | Refills: 0 | Status: SHIPPED | OUTPATIENT
Start: 2025-04-09

## 2025-04-09 ASSESSMENT — ANXIETY QUESTIONNAIRES
7. FEELING AFRAID AS IF SOMETHING AWFUL MIGHT HAPPEN: NOT AT ALL
7. FEELING AFRAID AS IF SOMETHING AWFUL MIGHT HAPPEN: NOT AT ALL
8. IF YOU CHECKED OFF ANY PROBLEMS, HOW DIFFICULT HAVE THESE MADE IT FOR YOU TO DO YOUR WORK, TAKE CARE OF THINGS AT HOME, OR GET ALONG WITH OTHER PEOPLE?: SOMEWHAT DIFFICULT
6. BECOMING EASILY ANNOYED OR IRRITABLE: SEVERAL DAYS
GAD7 TOTAL SCORE: 7
2. NOT BEING ABLE TO STOP OR CONTROL WORRYING: SEVERAL DAYS
4. TROUBLE RELAXING: SEVERAL DAYS
1. FEELING NERVOUS, ANXIOUS, OR ON EDGE: SEVERAL DAYS
3. WORRYING TOO MUCH ABOUT DIFFERENT THINGS: SEVERAL DAYS
5. BEING SO RESTLESS THAT IT IS HARD TO SIT STILL: MORE THAN HALF THE DAYS
IF YOU CHECKED OFF ANY PROBLEMS ON THIS QUESTIONNAIRE, HOW DIFFICULT HAVE THESE PROBLEMS MADE IT FOR YOU TO DO YOUR WORK, TAKE CARE OF THINGS AT HOME, OR GET ALONG WITH OTHER PEOPLE: SOMEWHAT DIFFICULT

## 2025-04-09 ASSESSMENT — PATIENT HEALTH QUESTIONNAIRE - PHQ9
SUM OF ALL RESPONSES TO PHQ QUESTIONS 1-9: 5
SUM OF ALL RESPONSES TO PHQ QUESTIONS 1-9: 5
10. IF YOU CHECKED OFF ANY PROBLEMS, HOW DIFFICULT HAVE THESE PROBLEMS MADE IT FOR YOU TO DO YOUR WORK, TAKE CARE OF THINGS AT HOME, OR GET ALONG WITH OTHER PEOPLE: SOMEWHAT DIFFICULT

## 2025-04-09 NOTE — TELEPHONE ENCOUNTER
Requested Prescriptions   Pending Prescriptions Disp Refills    sertraline (ZOLOFT) 100 MG tablet 90 tablet 1     Sig: Take 1 tablet (100 mg) by mouth daily.       SSRIs Protocol Failed - 4/9/2025  1:06 PM        Failed - PHQ-9 score less than 5 in past 6 months     Please review last PHQ-9 score.       8/27/2024     4:13 PM 9/13/2024    12:08 PM 9/13/2024    12:12 PM   PHQ-9 SCORE   PHQ-9 Total Score MyChart 7 (Mild depression) 13 (Moderate depression) 8 (Mild depression)   PHQ-9 Total Score 7 13 8             Passed - Medication is active on med list and the sig matches. RN to manually verify dose and sig if red X/fail.     If the protocol passes (green check), you do not need to verify med dose and sig.    A prescription matches if they are the same clinical intention.    For Example: once daily and every morning are the same.    The protocol can not identify upper and lower case letters as matching and will fail.     For Example: Take 1 tablet (50 mg) by mouth daily     TAKE 1 TABLET (50 MG) BY MOUTH DAILY    For all fails (red x), verify dose and sig.    If the refill does match what is on file, the RN can still proceed to approve the refill request.       If they do not match, route to the appropriate provider.             Passed - Recent (12 mo) or future (90 days) visit within the authorizing provider's specialty     The patient must have completed an in-person or virtual visit within the past 12 months or has a future visit scheduled within the next 90 days with the authorizing provider s specialty.  Urgent care and e-visits do not qualify as an office visit for this protocol.          Passed - Medication indicated for associated diagnosis     Medication is associated with one or more of the following diagnoses:              Anxiety             Bipolar  Depression  Obsessive-compulsive disorder             Panic disorder  Postmenopausal flushing             Premenstrual dysphoric disorder             Social  phobia   Adjustment disorder with depressed mood   Mood disorder   Adjustment disorder with anxious mood          Passed - Patient is age 18 or older        Passed - No active pregnancy on record        Passed - No positive pregnancy test in last 12 months           Last Written Prescription Date:  9/14/24  Last Fill Quantity: 90,  # refills: 1   Last office visit: 2/7/2024 ; last virtual visit: 6/25/2024 with prescribing provider:  Dr. Myrick   Future Office Visit:    None

## 2025-04-09 NOTE — TELEPHONE ENCOUNTER
I would start with therapy with [facility] and see FP.   She may consider getting on their waiting list to see if sooner opening comes up.   I don't think psych will be able to see her sooner and still seems within realm of FP to manage.  Agree with all precautions/instructions

## 2025-04-09 NOTE — TELEPHONE ENCOUNTER
Pt has read [employer] with Dr. Cherry's recommendations  April has reached out via [employer] as well.   RN placed her on waitlist for her upcoming FP appt but told her to reach out to their clinic to be put on a wait list for any provider in their clinic.     Caprice Newman RN on 4/9/2025 at 4:23 PM

## 2025-04-09 NOTE — TELEPHONE ENCOUNTER
Pt had requested zoloft refill--increased from 50 to 100mg in September  Had patient fill out ISAEL/PHQ today  Pt flagged for thoughts of self harm on her PHQ/PHQ alert    RN called pt to touch base on mood/next steps  Discussed if she noticed improvement with increase in September, pt said she has days she feels like it's good and some days she feels really bad  Hasn't seen therapist in quite awhile  Was seeing one person she liked, then transferred to another after they left and didn't love that one--said she was more trauma based instead of being more PP/motherhood issue related  Pt knows Dr. Cherry wanted her to veer to FP management or other provider, pt has annual to establish with [facility] FP provider in June  Understands why Dr. Cherry wants her to find someone to help manage, said she won't be having more kids so doesn't need OBGYN       Discussed options of next steps  1) option to have short term bridging therapy with [facility] Infante  2) pt open to doing psychiatry vs therapy referral  3) pt unsure if she would benefit from dose increase or what she wants to do with meds    Plan:   -Will route chart to [facility], pt aware [facility]leine will reach out to patient to help schedule some initial counseling sessions, then [facility]leine can help direct referral for other therapists that would be best fit for her in the community  -Will route to Dr. Cherry to be aware and get her feedback on what referral would benefit best/if MD thinks we should increase dose or change medication in the mean time. (Pt feels both anxiety/depression vs one or the other)  -pt doesn't feel on call MD needs to address med changes, will wait for Dr. Cherry's feedback tomorrow.   -Discussed concerns with PHQ showing flags of self harm  -pt on last day of zoloft so will send in temp refill so pt doesn't have lapse in zoloft  Wouldn't let me order her normal 90 pills so 60 pills sent  -Pended psychiatry referral if best option     Pt confirmed that she has no  "actions, plans or contemplating further actions  Pt said sometimes if she has a hard day and \"doesn't want to be here\" but has no plan or thoughts to act\"    Let pt know that if she were to have or develop any thoughts about harming yourself or others, we would want you to proceed to the either the [facility] emergency room, the [facility] at [facility], or call the mental health crisis hotline at 8.     Pt verbalized understanding, in agreement with plan, and voiced no further questions.  Caprice Newman RN on 4/9/2025 at 2:26 PM      "

## 2025-04-28 ENCOUNTER — VIRTUAL VISIT (OUTPATIENT)
Dept: BEHAVIORAL HEALTH | Facility: CLINIC | Age: 35
End: 2025-04-28
Payer: COMMERCIAL

## 2025-04-28 DIAGNOSIS — F43.23 ADJUSTMENT DISORDER WITH MIXED ANXIETY AND DEPRESSED MOOD: Primary | ICD-10-CM

## 2025-04-28 PROCEDURE — 90791 PSYCH DIAGNOSTIC EVALUATION: CPT | Mod: 52 | Performed by: SOCIAL WORKER

## 2025-04-28 NOTE — PROGRESS NOTES
[facility]: Integrated Behavioral Health     Integrated Behavioral Health Services   Mental Health and Addiction Services     Brief Diagnostic Assessment        PATIENT'S NAME: Philip Pfeiffer  MRN: [id number]     : 1990     DATE OF SERVICE: 2025  SERVICE LOCATION: [facility] / Email (patient reached)   SERVICE MODALITY: Video Visit:      Provider verified identity through the following two step process.  Patient provided:  Patient  and Patient address    Telemedicine Visit: The patient's condition can be safely assessed and treated via synchronous audio and visual telemedicine encounter.      Reason for Telemedicine Visit: Patient has requested telehealth visit    Originating Site (Patient Location): Patient's home    Distant Site (Provider Location): [facility] [place] [facility]    Consent:  The patient/guardian has verbally consented to: the potential risks and benefits of telemedicine (video visit) versus in person care; bill my insurance or make self-payment for services provided; and responsibility for payment of non-covered services.     Patient would like the video invitation sent by:  My Chart    Mode of Communication:  Video Conference via [employer]    Distant Location (Provider):  On-site    As the provider I attest to compliance with applicable laws and regulations related to telemedicine.  Visit Start Time: 1:50 PM  Visit End Time:  2:34 PM   VISIT NUMBER: 2  [facility] Visit Activities: [facility] Only     Assessments completed:    The following assessments were completed by patient for this visit:  PHQ9:       2024    10:04 AM 2024     2:01 PM 2024     4:13 PM 2024    12:08 PM 2024    12:12 PM 2025     1:35 PM 2025     1:46 PM   PHQ-9 SCORE   PHQ-9 Total Score [facility] 4 (Minimal depression) 2 (Minimal depression) 7 (Mild depression) 13 (Moderate depression) 8 (Mild depression) 5 (Mild depression) 7 (Mild depression)   PHQ-9 Total  Score 4 2 7 13 8 5  7        Patient-reported     GAD7:       5/3/2023     9:33 AM 5/23/2024    12:28 PM 6/25/2024     2:02 PM 8/27/2024     4:13 PM 9/13/2024    12:07 PM 9/13/2024    12:12 PM 4/9/2025     1:34 PM   ISAEL-7 SCORE   Total Score  12 (moderate anxiety) 7 (mild anxiety) 14 (moderate anxiety) 14 (moderate anxiety) 17 (severe anxiety) 7 (mild anxiety)   Total Score 0 12 7 14 14 17 7        Patient-reported     CAGE-AID:       4/25/2022     2:08 PM 4/13/2025     1:46 PM   CAGE-AID Total Score   Total Score 0 0    Total Score MyChart 0 (A total score of 2 or greater is considered clinically significant) 0 (A total score of 2 or greater is considered clinically significant)       Patient-reported     PROMIS 10-Global Health (all questions and answers displayed):       4/28/2022     2:30 PM 6/14/2024    10:17 AM 8/27/2024     4:14 PM 4/28/2025    12:33 PM   PROMIS 10   In general, would you say your health is: Good Good Good Good   In general, would you say your quality of life is: Very good Very good Good Very good   In general, how would you rate your physical health? Good Fair Fair Fair   In general, how would you rate your mental health, including your mood and your ability to think? Good Good Good Fair   In general, how would you rate your satisfaction with your social activities and relationships? Good Very good Fair Good   In general, please rate how well you carry out your usual social activities and roles Good Good Very good Good   To what extent are you able to carry out your everyday physical activities such as walking, climbing stairs, carrying groceries, or moving a chair? Completely Completely Completely A little   In the past 7 days, how often have you been bothered by emotional problems such as feeling anxious, depressed, or irritable? Rarely Sometimes Sometimes Often   In the past 7 days, how would you rate your fatigue on average? Mild Moderate Mild Mild   In the past 7 days, how would you  rate your pain on average, where 0 means no pain, and 10 means worst imaginable pain? 0 0 0 0   In general, would you say your health is: 3 3 3 3   In general, would you say your quality of life is: 4 4 3 4   In general, how would you rate your physical health? 3 2 2 2   In general, how would you rate your mental health, including your mood and your ability to think? 3 3 3 2   In general, how would you rate your satisfaction with your social activities and relationships? 3 4 2 3   In general, please rate how well you carry out your usual social activities and roles. (This includes activities at home, at work and in your community, and responsibilities as a parent, child, spouse, employee, friend, etc.) 3 3 4 3   To what extent are you able to carry out your everyday physical activities such as walking, climbing stairs, carrying groceries, or moving a chair? 5 5 5 2   In the past 7 days, how often have you been bothered by emotional problems such as feeling anxious, depressed, or irritable? 2 3 3 4   In the past 7 days, how would you rate your fatigue on average? 2 3 2 2   In the past 7 days, how would you rate your pain on average, where 0 means no pain, and 10 means worst imaginable pain? 0 0 0 0   Global Mental Health Score 14 14 11 11    Global Physical Health Score 17 15 16 13    PROMIS TOTAL - SUBSCORES 31 29 27 24        Patient-reported     [place] Suicide Severity Rating Scale (Lifetime/Recent)      8/10/2021     2:00 AM 4/27/2022     1:13 PM 6/17/2024    10:00 AM 4/14/2025     3:05 PM   [place] Suicide Severity Rating (Lifetime/Recent)   Q1 Wished to be Dead (Past Month) no      Q2 Suicidal Thoughts (Past Month) no      1. Wish to be Dead (Lifetime)  N N Y   1. Wish to be Dead (Past 1 Month)    Y   2. Non-Specific Active Suicidal Thoughts (Lifetime)  N N N   2. Non-Specific Active Suicidal Thoughts (Past 1 Month)    N   3. Active Suicidal Ideation with any Methods (Not Plan) Without Intent to Act  (Lifetime)    N   3. Active Suicidal Ideation with any Methods (Not Plan) Without Intent to Act (Past 1 Month)    N   4. Active Suicidal Ideation with Some Intent to Act, Without Specific Plan (Lifetime)    N   4. Active Suicidal Ideation with Some Intent to Act, Without Specific Plan (Past 1 Month)    N   5. Active Suicidal Ideation with Specific Plan and Intent (Lifetime)    N   5. Active Suicidal Ideation with Specific Plan and Intent (Past 1 Month)    N   Actual Attempt (Lifetime)  N N N   Has subject engaged in non-suicidal self-injurious behavior? (Lifetime)  N N N   Interrupted Attempts (Lifetime)  N N N   Aborted or Self-Interrupted Attempt (Lifetime)  N N N   Preparatory Acts or Behavior (Lifetime)  N N N   Calculated C-SSRS Risk Score (Lifetime/Recent)  No Risk Indicated No Risk Indicated Low Risk        Identifying Information:     Patient is a 34 year old female,     ,   adult.  Patient attended the session alone.         Referral:   Who referred you to care: self,  .    Reason for referral: determine behavioral health treatment options.        Patient's Statement of Presenting Concern:     Patient reports the following reason(s) for seeking an assessment at this time: Depression anxiety .  Patient stated that symptoms have resulted in the following functional impairments: childcare / parenting and self-care     History of Presenting Concern:     Patient reports that these problem(s) began 04/29/24  . Patient has not attempted to resolve these concerns in the past. Patient reports that other professional(s) are not involved in providing support / services.      Social/Family History:     The patient describes their cultural background as ,      Cultural influences and impact on patient's life structure, values, norms, and healthcare: [place] town [place] up. 3rd child out of 4. .      Contextual influences on patient's health include: Individual Factors pt needed to stay home with  kids due to financial costs of  .      Cultural, Contextual, and socioeconomic factors do not affect the patient's access to services.  These factors will be addressed in the Preliminary Treatment plan.    Patient identified their preferred language to be English,  . Patient reported they do not  need the assistance of an  or other support involved in therapy.      Current living situation: staying in own home/apartment, , , , ,yes,Ann Marie,3,Daughter,yes with  and two kids       Socioeconomic status and needs: does have financial concerns, but they do not wish to have them addressed.     Patient's current significant relationships include: parents; siblings; friends; spouse; other,Mother in law, father in law and sister in law     Patient's sexual orientation is heterosexual,     Patient reported having 2  children.      Patient identified some stable and meaningful social connections.      Patient identified the following strengths or resources that will help her: family, help seeking    Highest education level was college graduate,  .      Patient is currently unemployed .     Patient reported that they have not  been involved with the legal system. Do you have a probation office? Patient does not        Medical History:    Family History of Mental Health: did not disclose    Current Medical Health Concerns: None reported    Current Medication:    Patient reports current meds as:   Current Outpatient Medications   Medication Sig Dispense Refill    Prenatal Vit-Fe Fumarate-FA (PRENATAL VITAMIN PO)       sertraline (ZOLOFT) 100 MG tablet Take 1 tablet (100 mg) by mouth daily. 60 tablet 0    sertraline (ZOLOFT) 50 MG tablet Take 1 tablet (50 mg) by mouth daily 90 tablet 3     No current facility-administered medications for this visit.        Medication Adherence:     Patient reports taking/not taking prescription medications as prescribed: taking .     Substance Use:      Patient denies using  alcohol.   Patient denies using tobacco  Patient denies using cannabis.     Patient reports using/abusing the following substance(s). Patient denies any history of substance use.      Substance Use: No symptoms     Based on the negative CAGE score and clinical interview there  are not indications of drug or alcohol abuse.        Significant Losses / Trauma / Abuse / Neglect Issues:     There are no indications or report of: significant losses, trauma, abuse or neglect.   Issues of possible neglect are not present.           Mental Status Assessment:     Appearance:   Appropriate    Eye Contact:   Good    Psychomotor Behavior: Normal    Attitude:   Interested Pleasant Guarded    Orientation:   All   Speech Rate / Production: Normal/ Responsive Normal    Volume:   Normal    Mood:    Anxious  Sad    Affect:    Appropriate    Thought Content:  Clear    Thought Form:  Coherent  Logical    Insight:    Good          Safety Assessment:     Patient has had a history of suicidal ideation: passive SI, thoughts of just wanting to disappear, not to die, no plans or intent and denies a history of suicide attempts, self-injurious behavior, homicidal ideation, homicidal behavior, and and other safety concerns   Patient denies current fears or concerns for personal safety.   Patient denies current or recent suicidal ideation or behaviors.   Patient denies current or recent homicidal ideation or behaviors.   Patient denies current or recent self injurious behavior or ideation.   Patient denies other safety concerns.   Patient reports there are/are not firearms in the house  are not;     Protective Factors forward or future oriented thinking; dedication to family or friends; safe and stable environment; sense of belonging; purpose; secure attachment; living with other people; daily obligations; structured day; commitment to well being; sense of meaning; positive social skills; strong sense of self worth or esteem; sense of personal  control or determination;       Risk Factors Current high stress    Plan for Safety and Risk Management:     Safety and Risk: Recommended that patient call 911 or go to the local ED should there be a change in any of these risk factors.          Report to child / adult protection services was NA.        Diagnostic Criteria:     Adjustment Disorder  A. The development of emotional or behavioral symptoms in response to an identifiable stressor(s) occurring within 3 months of the onset of the stressor(s)  B. These symptoms or behaviors are clinically significant, as evidenced by one or both of the following:  C. The stress-related disturbance does not meet criteria for another disorder & is not not an exacerbation of another mental disorder  D. The symptoms do not represent normal bereavement  E. Once the stressor or its consequences have terminated, the symptoms do not persist for more than an additional 6 months       * Adjustment Disorder with Mixed Anxiety and Depressed Mood: The predominant manifestation is a combination of depression and anxiety     DSM5 Diagnoses:      Diagnoses: Adjustment Disorders  309.28 (F43.23) With mixed anxiety and depressed mood   Psychosocial / Contextual Factors: Financial Strain and Parent/child dynamics       Preliminary Treatment Plan:     It is expected that patient will need less than 10 psychotherapy sessions in the next 12 months, as they have received less than 10 in the previous year.     Chemical dependency recommendations: No indications of CD issues     As a preliminary treatment goal, patient will develop coping/problem-solving skills to facilitate more adaptive adjustment.     Collaboration with other professionals is not indicated at this time.     Referral to another professional/service is not indicated at this time..     A Release of Information is not needed at this time.             Madeleine Infante, [facility]   April 28, 2025

## 2025-05-21 SDOH — HEALTH STABILITY: PHYSICAL HEALTH: ON AVERAGE, HOW MANY DAYS PER WEEK DO YOU ENGAGE IN MODERATE TO STRENUOUS EXERCISE (LIKE A BRISK WALK)?: 5 DAYS

## 2025-05-21 SDOH — HEALTH STABILITY: PHYSICAL HEALTH: ON AVERAGE, HOW MANY MINUTES DO YOU ENGAGE IN EXERCISE AT THIS LEVEL?: 30 MIN

## 2025-05-21 ASSESSMENT — PATIENT HEALTH QUESTIONNAIRE - PHQ9
10. IF YOU CHECKED OFF ANY PROBLEMS, HOW DIFFICULT HAVE THESE PROBLEMS MADE IT FOR YOU TO DO YOUR WORK, TAKE CARE OF THINGS AT HOME, OR GET ALONG WITH OTHER PEOPLE: NOT DIFFICULT AT ALL
SUM OF ALL RESPONSES TO PHQ QUESTIONS 1-9: 6
SUM OF ALL RESPONSES TO PHQ QUESTIONS 1-9: 6

## 2025-05-21 ASSESSMENT — ANXIETY QUESTIONNAIRES
6. BECOMING EASILY ANNOYED OR IRRITABLE: MORE THAN HALF THE DAYS
GAD7 TOTAL SCORE: 16
8. IF YOU CHECKED OFF ANY PROBLEMS, HOW DIFFICULT HAVE THESE MADE IT FOR YOU TO DO YOUR WORK, TAKE CARE OF THINGS AT HOME, OR GET ALONG WITH OTHER PEOPLE?: SOMEWHAT DIFFICULT
IF YOU CHECKED OFF ANY PROBLEMS ON THIS QUESTIONNAIRE, HOW DIFFICULT HAVE THESE PROBLEMS MADE IT FOR YOU TO DO YOUR WORK, TAKE CARE OF THINGS AT HOME, OR GET ALONG WITH OTHER PEOPLE: SOMEWHAT DIFFICULT
GAD7 TOTAL SCORE: 16
2. NOT BEING ABLE TO STOP OR CONTROL WORRYING: MORE THAN HALF THE DAYS
1. FEELING NERVOUS, ANXIOUS, OR ON EDGE: SEVERAL DAYS
7. FEELING AFRAID AS IF SOMETHING AWFUL MIGHT HAPPEN: NEARLY EVERY DAY
GAD7 TOTAL SCORE: 16
4. TROUBLE RELAXING: NEARLY EVERY DAY
3. WORRYING TOO MUCH ABOUT DIFFERENT THINGS: NEARLY EVERY DAY
5. BEING SO RESTLESS THAT IT IS HARD TO SIT STILL: MORE THAN HALF THE DAYS
7. FEELING AFRAID AS IF SOMETHING AWFUL MIGHT HAPPEN: NEARLY EVERY DAY

## 2025-05-21 ASSESSMENT — SOCIAL DETERMINANTS OF HEALTH (SDOH): HOW OFTEN DO YOU GET TOGETHER WITH FRIENDS OR RELATIVES?: TWICE A WEEK

## 2025-05-22 ENCOUNTER — OFFICE VISIT (OUTPATIENT)
Dept: FAMILY MEDICINE | Facility: CLINIC | Age: 35
End: 2025-05-22
Payer: COMMERCIAL

## 2025-05-22 VITALS
WEIGHT: 184.2 LBS | OXYGEN SATURATION: 99 % | DIASTOLIC BLOOD PRESSURE: 68 MMHG | HEART RATE: 98 BPM | HEIGHT: 67 IN | SYSTOLIC BLOOD PRESSURE: 104 MMHG | BODY MASS INDEX: 28.91 KG/M2 | RESPIRATION RATE: 16 BRPM | TEMPERATURE: 98.8 F

## 2025-05-22 DIAGNOSIS — Z00.00 ROUTINE GENERAL MEDICAL EXAMINATION AT A HEALTH CARE FACILITY: Primary | ICD-10-CM

## 2025-05-22 DIAGNOSIS — F32.1 CURRENT MODERATE EPISODE OF MAJOR DEPRESSIVE DISORDER, UNSPECIFIED WHETHER RECURRENT (H): ICD-10-CM

## 2025-05-22 DIAGNOSIS — Z13.220 LIPID SCREENING: ICD-10-CM

## 2025-05-22 DIAGNOSIS — Z11.59 SCREENING FOR VIRAL DISEASE: ICD-10-CM

## 2025-05-22 DIAGNOSIS — Z13.1 SCREENING FOR DIABETES MELLITUS: ICD-10-CM

## 2025-05-22 LAB
ANION GAP SERPL CALCULATED.3IONS-SCNC: 11 MMOL/L (ref 7–15)
BUN SERPL-MCNC: 11.7 MG/DL (ref 6–20)
CALCIUM SERPL-MCNC: 9.3 MG/DL (ref 8.8–10.4)
CHLORIDE SERPL-SCNC: 103 MMOL/L (ref 98–107)
CHOLEST SERPL-MCNC: 205 MG/DL
CREAT SERPL-MCNC: 0.64 MG/DL (ref 0.51–0.95)
EGFRCR SERPLBLD CKD-EPI 2021: >90 ML/MIN/1.73M2
FASTING STATUS PATIENT QL REPORTED: YES
FASTING STATUS PATIENT QL REPORTED: YES
GLUCOSE SERPL-MCNC: 95 MG/DL (ref 70–99)
HBV SURFACE AB SERPL IA-ACNC: >1000 M[IU]/ML
HBV SURFACE AB SERPL IA-ACNC: REACTIVE M[IU]/ML
HCO3 SERPL-SCNC: 23 MMOL/L (ref 22–29)
HDLC SERPL-MCNC: 49 MG/DL
LDLC SERPL CALC-MCNC: 121 MG/DL
MEV IGG SER IA-ACNC: 27.5 AU/ML
MEV IGG SER IA-ACNC: POSITIVE
NONHDLC SERPL-MCNC: 156 MG/DL
POTASSIUM SERPL-SCNC: 4.2 MMOL/L (ref 3.4–5.3)
SODIUM SERPL-SCNC: 137 MMOL/L (ref 135–145)
TRIGL SERPL-MCNC: 176 MG/DL

## 2025-05-22 RX ORDER — SERTRALINE HYDROCHLORIDE 100 MG/1
150 TABLET, FILM COATED ORAL DAILY
Qty: 135 TABLET | Refills: 1 | Status: SHIPPED | OUTPATIENT
Start: 2025-05-22

## 2025-05-22 ASSESSMENT — PAIN SCALES - GENERAL: PAINLEVEL_OUTOF10: NO PAIN (0)

## 2025-05-22 NOTE — PROGRESS NOTES
"Preventive Care Visit  [facility]  Jennifer DO [place], [facility]  May 22, 2025      Assessment & Plan     Routine general medical examination at a health care facility      Current moderate episode of major depressive disorder, unspecified whether recurrent (H)    Increase zoloft to 150 mg daily and recheck 6 weeks    - sertraline (ZOLOFT) 100 MG tablet; Take 1.5 tablets (150 mg) by mouth daily.    Screening for diabetes mellitus    - Basic metabolic panel  (Ca, Cl, CO2, Creat, Gluc, K, Na, BUN); Future  - Basic metabolic panel  (Ca, Cl, CO2, Creat, Gluc, K, Na, BUN)    Lipid screening    - Lipid panel reflex to direct LDL Fasting; Future  - Lipid panel reflex to direct LDL Fasting    Screening for viral disease    - Hepatitis B Surface Antibody; Future  - Rubeola Antibody IgG; Future  - Hepatitis B Surface Antibody  - Rubeola Antibody IgG            BMI  Estimated body mass index is 28.85 kg/m  as calculated from the following:    Height as of this encounter: 1.702 m (5' 7\").    Weight as of this encounter: 83.6 kg (184 lb 3.2 oz).       Counseling  Appropriate preventive services were addressed with this patient via screening, questionnaire, or discussion as appropriate for fall prevention, nutrition, physical activity, Tobacco-use cessation, social engagement, weight loss and cognition.  Checklist reviewing preventive services available has been given to the patient.  Reviewed patient's diet, addressing concerns and/or questions.   She is at risk for psychosocial distress and has been provided with information to reduce risk.   The patient's PHQ-9 score is consistent with mild depression. She was provided with information regarding depression.       Follow-up   Return in about 6 weeks (around 7/3/2025) for follow-up per plan moods- virtual visit okay .     Follow-up Visit   Expected date:  May 22, 2026 (Approximate)      Follow Up Appointment Details:     Follow-up with whom?: PCP    " Follow-Up for what?: Adult Preventive    How?: In Person                 Yobani Palacios is a 34 year old, presenting for the following:  Physical        5/22/2025     7:09 AM   Additional Questions   Roomed by Ronit FIGUEROA   Accompanied by self         5/22/2025     7:09 AM   Patient Reported Additional Medications   Patient reports taking the following new medications none          HPI     Depression and Anxiety   How are you doing with your depression since your last visit?  Highs and Lows  How are you doing with your anxiety since your last visit?   Highs and lows  Are you having other symptoms that might be associated with depression or anxiety? Yes:  constant worry, cannot turn off brain, depression  Have you had a significant life event? OTHER: 2 young kids 3 year old and 15 month old   Do you have any concerns with your use of alcohol or other drugs? No  Zoloft 100 mg daily had been managed by OB/GYN    Social History     Tobacco Use    Smoking status: Never     Passive exposure: Never    Smokeless tobacco: Never   Vaping Use    Vaping status: Never Used   Substance Use Topics    Alcohol use: Not Currently     Comment: about 4 glasses wine per week    Drug use: No         4/9/2025     1:35 PM 4/13/2025     1:46 PM 5/21/2025     8:19 AM   PHQ   PHQ-9 Total Score 5  7  6    Q9: Thoughts of better off dead/self-harm past 2 weeks Several days Several days Not at all   F/U: Thoughts of suicide or self-harm Yes Yes    F/U: Self harm-plan No No    F/U: Self-harm action No No    F/U: Safety concerns No No        Patient-reported         9/13/2024    12:12 PM 4/9/2025     1:34 PM 5/21/2025     8:19 AM   ISAEL-7 SCORE   Total Score 17 (severe anxiety) 7 (mild anxiety) 16 (severe anxiety)   Total Score 17 7  16        Patient-reported         5/21/2025     8:19 AM   Last PHQ-9   1.  Little interest or pleasure in doing things 1   2.  Feeling down, depressed, or hopeless 1   3.  Trouble falling or staying asleep, or sleeping  too much 1   4.  Feeling tired or having little energy 2   5.  Poor appetite or overeating 0   6.  Feeling bad about yourself 1   7.  Trouble concentrating 0   8.  Moving slowly or restless 0   Q9: Thoughts of better off dead/self-harm past 2 weeks 0   PHQ-9 Total Score 6        Patient-reported         5/21/2025     8:19 AM   ISAEL-7    1. Feeling nervous, anxious, or on edge 1   2. Not being able to stop or control worrying 2   3. Worrying too much about different things 3   4. Trouble relaxing 3   5. Being so restless that it is hard to sit still 2   6. Becoming easily annoyed or irritable 2   7. Feeling afraid, as if something awful might happen 3   ISAEL-7 Total Score 16    If you checked any problems, how difficult have they made it for you to do your work, take care of things at home, or get along with other people? Somewhat difficult       Patient-reported       Suicide Assessment Five-step Evaluation and Treatment (SAFE-T)          Advance Care Planning    Discussed advance care planning with patient; informed AVS has link to [facility].        5/21/2025   General Health   How would you rate your overall physical health? (!) FAIR   Feel stress (tense, anxious, or unable to sleep) Rather much   (!) STRESS CONCERN      5/21/2025   Nutrition   Three or more servings of calcium each day? Yes   Diet: Regular (no restrictions)   How many servings of fruit and vegetables per day? (!) 2-3   How many sweetened beverages each day? 0-1         5/21/2025   Exercise   Days per week of moderate/strenous exercise 5 days   Average minutes spent exercising at this level 30 min         5/21/2025   Social Factors   Frequency of gathering with friends or relatives Twice a week   Worry food won't last until get money to buy more No   Food not last or not have enough money for food? No   Do you have housing? (Housing is defined as stable permanent housing and does not include staying outside in a car, in a tent, in an  "abandoned building, in an overnight shelter, or couch-surfing.) Yes   Are you worried about losing your housing? No   Lack of transportation? No   Unable to get utilities (heat,electricity)? No         5/21/2025   Dental   Dentist two times every year? Yes       Today's PHQ-9 Score:       5/21/2025     8:19 AM   PHQ-9 SCORE   PHQ-9 Total Score MyChart 6 (Mild depression)   PHQ-9 Total Score 6        Patient-reported         5/21/2025   Substance Use   Alcohol more than 3/day or more than 7/wk No   Do you use any other substances recreationally? No     Social History     Tobacco Use    Smoking status: Never     Passive exposure: Never    Smokeless tobacco: Never   Vaping Use    Vaping status: Never Used   Substance Use Topics    Alcohol use: Not Currently     Comment: about 4 glasses wine per week    Drug use: No          Mammogram Screening - Patient under 40 years of age: Routine Mammogram Screening not recommended.         5/21/2025   STI Screening   New sexual partner(s) since last STI/HIV test? No     History of abnormal Pap smear: No - age 30- 64 PAP with HPV every 5 years recommended        Latest Ref Rng & Units 1/23/2024    11:00 AM 11/6/2020    12:00 AM 2/15/2019     1:17 PM   PAP / HPV   PAP  Negative for Intraepithelial Lesion or Malignancy (NILM)      PAP (Historical)   NIL  NIL    HPV 16 DNA Negative Negative      HPV 18 DNA Negative Negative      Other HR HPV Negative Negative      PAP-ABSTRACT   See Scanned Document            Reviewed and updated as needed this visit by Provider                          Review of Systems  Constitutional, HEENT, cardiovascular, pulmonary, gi and gu systems are negative, except as otherwise noted.     Objective    Exam  /68   Pulse 98   Temp 98.8  F (37.1  C) (Oral)   Resp 16   Ht 1.702 m (5' 7\")   Wt 83.6 kg (184 lb 3.2 oz)   LMP 05/21/2025   SpO2 99%   Breastfeeding No   BMI 28.85 kg/m     Estimated body mass index is 28.85 kg/m  as calculated from the " "following:    Height as of this encounter: 1.702 m (5' 7\").    Weight as of this encounter: 83.6 kg (184 lb 3.2 oz).    Physical Exam  GENERAL: alert and no distress  EYES: Eyes grossly normal to inspection, PERRL and conjunctivae and sclerae normal  HENT: ear canals and TM's normal, nose and mouth without ulcers or lesions  NECK: no adenopathy, no asymmetry, masses, or scars  RESP: lungs clear to auscultation - no rales, rhonchi or wheezes  BREAST: normal without masses, tenderness or nipple discharge and no palpable axillary masses or adenopathy  CV: regular rate and rhythm, normal S1 S2, no S3 or S4, no murmur, click or rub, no peripheral edema  ABDOMEN: soft, nontender, no hepatosplenomegaly, no masses and bowel sounds normal  MS: no gross musculoskeletal defects noted, no edema  SKIN: no suspicious lesions or rashes  NEURO: Normal strength and tone, mentation intact and speech normal  PSYCH: mentation appears normal, affect normal/bright        Signed Electronically by: Jennifer Sabillon DO    "

## 2025-05-22 NOTE — PATIENT INSTRUCTIONS
Patient Education   Preventive Care Advice    Happiest toddler on the block    Playful parenting       This is general advice given by our system to help you stay healthy. However, your care team may have specific advice just for you. Please talk to your care team about your preventive care needs.  Nutrition  Eat 5 or more servings of fruits and vegetables each day.  Try wheat bread, brown rice and whole grain pasta (instead of white bread, rice, and pasta).  Get enough calcium and vitamin D. Check the label on foods and aim for 100% of the RDA (recommended daily allowance).  Lifestyle  Exercise at least 150 minutes each week  (30 minutes a day, 5 days a week).  Do muscle strengthening activities 2 days a week. These help control your weight and prevent disease.  No smoking.  Wear sunscreen to prevent skin cancer.  Have a dental exam and cleaning every 6 months.  Yearly exams  See your health care team every year to talk about:  Any changes in your health.  Any medicines your care team has prescribed.  Preventive care, family planning, and ways to prevent chronic diseases.  Shots (vaccines)   HPV shots (up to age 26), if you've never had them before.  Hepatitis B shots (up to age 59), if you've never had them before.  COVID-19 shot: Get this shot when it's due.  Flu shot: Get a flu shot every year.  Tetanus shot: Get a tetanus shot every 10 years.  Pneumococcal, hepatitis A, and RSV shots: Ask your care team if you need these based on your risk.  Shingles shot (for age 50 and up)  General health tests  Diabetes screening:  Starting at age 35, Get screened for diabetes at least every 3 years.  If you are younger than age 35, ask your care team if you should be screened for diabetes.  Cholesterol test: At age 39, start having a cholesterol test every 5 years, or more often if advised.  Bone density scan (DEXA): At age 50, ask your care team if you should have this scan for osteoporosis (brittle bones).  Hepatitis C:  Get tested at least once in your life.  STIs (sexually transmitted infections)  Before age 24: Ask your care team if you should be screened for STIs.  After age 24: Get screened for STIs if you're at risk. You are at risk for STIs (including HIV) if:  You are sexually active with more than one person.  You don't use condoms every time.  You or a partner was diagnosed with a sexually transmitted infection.  If you are at risk for HIV, ask about PrEP medicine to prevent HIV.  Get tested for HIV at least once in your life, whether you are at risk for HIV or not.  Cancer screening tests  Cervical cancer screening: If you have a cervix, begin getting regular cervical cancer screening tests starting at age 21.  Breast cancer scan (mammogram): If you've ever had breasts, begin having regular mammograms starting at age 40. This is a scan to check for breast cancer.  Colon cancer screening: It is important to start screening for colon cancer at age 45.  Have a colonoscopy test every 10 years (or more often if you're at risk) Or, ask your provider about stool tests like a FIT test every year or Cologuard test every 3 years.  To learn more about your testing options, visit:   .  For help making a decision, visit:   https://bit.ly/os63019.  Prostate cancer screening test: If you have a prostate, ask your care team if a prostate cancer screening test (PSA) at age 55 is right for you.  Lung cancer screening: If you are a current or former smoker ages 50 to 80, ask your care team if ongoing lung cancer screenings are right for you.  For informational purposes only. Not to replace the advice of your health care provider. Copyright   2023 [employer]. All rights reserved. Clinically reviewed by the [facility] Transitions Program. [employer] [id number] - REV 01/24.

## 2025-05-23 ENCOUNTER — RESULTS FOLLOW-UP (OUTPATIENT)
Dept: FAMILY MEDICINE | Facility: CLINIC | Age: 35
End: 2025-05-23

## 2025-07-09 ASSESSMENT — ANXIETY QUESTIONNAIRES
7. FEELING AFRAID AS IF SOMETHING AWFUL MIGHT HAPPEN: MORE THAN HALF THE DAYS
GAD7 TOTAL SCORE: 11
6. BECOMING EASILY ANNOYED OR IRRITABLE: MORE THAN HALF THE DAYS
3. WORRYING TOO MUCH ABOUT DIFFERENT THINGS: MORE THAN HALF THE DAYS
7. FEELING AFRAID AS IF SOMETHING AWFUL MIGHT HAPPEN: MORE THAN HALF THE DAYS
GAD7 TOTAL SCORE: 11
1. FEELING NERVOUS, ANXIOUS, OR ON EDGE: SEVERAL DAYS
4. TROUBLE RELAXING: SEVERAL DAYS
8. IF YOU CHECKED OFF ANY PROBLEMS, HOW DIFFICULT HAVE THESE MADE IT FOR YOU TO DO YOUR WORK, TAKE CARE OF THINGS AT HOME, OR GET ALONG WITH OTHER PEOPLE?: SOMEWHAT DIFFICULT
IF YOU CHECKED OFF ANY PROBLEMS ON THIS QUESTIONNAIRE, HOW DIFFICULT HAVE THESE PROBLEMS MADE IT FOR YOU TO DO YOUR WORK, TAKE CARE OF THINGS AT HOME, OR GET ALONG WITH OTHER PEOPLE: SOMEWHAT DIFFICULT
GAD7 TOTAL SCORE: 11
2. NOT BEING ABLE TO STOP OR CONTROL WORRYING: MORE THAN HALF THE DAYS
5. BEING SO RESTLESS THAT IT IS HARD TO SIT STILL: SEVERAL DAYS

## 2025-07-09 ASSESSMENT — PATIENT HEALTH QUESTIONNAIRE - PHQ9
SUM OF ALL RESPONSES TO PHQ QUESTIONS 1-9: 2
10. IF YOU CHECKED OFF ANY PROBLEMS, HOW DIFFICULT HAVE THESE PROBLEMS MADE IT FOR YOU TO DO YOUR WORK, TAKE CARE OF THINGS AT HOME, OR GET ALONG WITH OTHER PEOPLE: SOMEWHAT DIFFICULT
SUM OF ALL RESPONSES TO PHQ QUESTIONS 1-9: 2

## 2025-07-10 ENCOUNTER — OFFICE VISIT (OUTPATIENT)
Dept: FAMILY MEDICINE | Facility: CLINIC | Age: 35
End: 2025-07-10
Payer: COMMERCIAL

## 2025-07-10 VITALS
TEMPERATURE: 98.6 F | BODY MASS INDEX: 28.28 KG/M2 | OXYGEN SATURATION: 99 % | WEIGHT: 180.2 LBS | RESPIRATION RATE: 20 BRPM | HEIGHT: 67 IN | SYSTOLIC BLOOD PRESSURE: 112 MMHG | HEART RATE: 98 BPM | DIASTOLIC BLOOD PRESSURE: 70 MMHG

## 2025-07-10 DIAGNOSIS — F32.1 CURRENT MODERATE EPISODE OF MAJOR DEPRESSIVE DISORDER, UNSPECIFIED WHETHER RECURRENT (H): Primary | ICD-10-CM

## 2025-07-10 DIAGNOSIS — F41.8 SITUATIONAL ANXIETY: ICD-10-CM

## 2025-07-10 ASSESSMENT — ENCOUNTER SYMPTOMS: NERVOUS/ANXIOUS: 1

## 2025-07-10 ASSESSMENT — PAIN SCALES - GENERAL: PAINLEVEL_OUTOF10: NO PAIN (0)

## 2025-07-10 NOTE — PATIENT INSTRUCTIONS
The happiest toddler on the block -- books or videos    Playful parenting -- book      Jennifer Sabillon D.O.

## (undated) DEVICE — ESU LIGASURE OPEN SEALER/DIVIDER SM JAW 16.5MM LF1212A

## (undated) DEVICE — STOCKING SLEEVE COMPRESSION CALF LG

## (undated) DEVICE — SOL WATER IRRIG 1000ML BOTTLE 07139-09

## (undated) DEVICE — SU VICRYL 4-0 KS 27" UND J662H

## (undated) DEVICE — STRAP KNEE/BODY 31143004

## (undated) DEVICE — PACK C-SECTION LF PL15OTA83B

## (undated) DEVICE — SOL ADH LIQUID BENZOIN SWAB 0.6ML C1544

## (undated) DEVICE — GLOVE PROTEXIS BLUE W/NEU-THERA 7.0  2D73EB70

## (undated) DEVICE — PREP CHLORAPREP 26ML TINTED ORANGE  260815

## (undated) DEVICE — ESU PENCIL W/SMOKE EVAC NEPTUNE STRYKER 0703-046-000

## (undated) DEVICE — SOL NACL 0.9% IRRIG 1000ML BOTTLE 07138-09

## (undated) DEVICE — CATH TRAY FOLEY 16FR BARDEX W/DRAIN BAG STATLOCK 300316A

## (undated) DEVICE — BNDG ABDOMINAL BINDER 10X26-50" 08140145

## (undated) DEVICE — DRSG ABDOMINAL 07 1/2X8" 7197D

## (undated) DEVICE — ESU GROUND PAD UNIVERSAL W/O CORD

## (undated) DEVICE — DRSG STERI STRIP 1/4X3" R1541

## (undated) DEVICE — GLOVE ESTEEM POWDER FREE SMT 6.5  2D72PT65

## (undated) DEVICE — SU VICRYL 3-0 CTX 36" UND J980H

## (undated) DEVICE — SU VICRYL 0 CT-1 36" J346H

## (undated) RX ORDER — MORPHINE SULFATE 1 MG/ML
INJECTION, SOLUTION EPIDURAL; INTRATHECAL; INTRAVENOUS
Status: DISPENSED
Start: 2023-12-11

## (undated) RX ORDER — MORPHINE SULFATE 1 MG/ML
INJECTION, SOLUTION EPIDURAL; INTRATHECAL; INTRAVENOUS
Status: DISPENSED
Start: 2021-08-09

## (undated) RX ORDER — OXYTOCIN/0.9 % SODIUM CHLORIDE 30/500 ML
PLASTIC BAG, INJECTION (ML) INTRAVENOUS
Status: DISPENSED
Start: 2021-08-10

## (undated) RX ORDER — PHENYLEPHRINE HCL IN 0.9% NACL 50MG/250ML
PLASTIC BAG, INJECTION (ML) INTRAVENOUS
Status: DISPENSED
Start: 2021-08-09

## (undated) RX ORDER — FENTANYL CITRATE-0.9 % NACL/PF 10 MCG/ML
PLASTIC BAG, INJECTION (ML) INTRAVENOUS
Status: DISPENSED
Start: 2021-08-09

## (undated) RX ORDER — OXYTOCIN/0.9 % SODIUM CHLORIDE 30/500 ML
PLASTIC BAG, INJECTION (ML) INTRAVENOUS
Status: DISPENSED
Start: 2021-08-09

## (undated) RX ORDER — FENTANYL CITRATE 50 UG/ML
INJECTION, SOLUTION INTRAMUSCULAR; INTRAVENOUS
Status: DISPENSED
Start: 2021-08-09

## (undated) RX ORDER — FENTANYL CITRATE 50 UG/ML
INJECTION, SOLUTION INTRAMUSCULAR; INTRAVENOUS
Status: DISPENSED
Start: 2023-12-11

## (undated) RX ORDER — KETOROLAC TROMETHAMINE 30 MG/ML
INJECTION, SOLUTION INTRAMUSCULAR; INTRAVENOUS
Status: DISPENSED
Start: 2021-08-09

## (undated) RX ORDER — BUPIVACAINE HYDROCHLORIDE 2.5 MG/ML
INJECTION, SOLUTION EPIDURAL; INFILTRATION; INTRACAUDAL
Status: DISPENSED
Start: 2023-12-11